# Patient Record
Sex: MALE | Race: WHITE | NOT HISPANIC OR LATINO | URBAN - METROPOLITAN AREA
[De-identification: names, ages, dates, MRNs, and addresses within clinical notes are randomized per-mention and may not be internally consistent; named-entity substitution may affect disease eponyms.]

---

## 2017-11-28 ENCOUNTER — EMERGENCY (EMERGENCY)
Facility: HOSPITAL | Age: 57
LOS: 1 days | Discharge: HOME | End: 2017-11-28
Attending: EMERGENCY MEDICINE | Admitting: EMERGENCY MEDICINE
Payer: COMMERCIAL

## 2017-11-28 VITALS
DIASTOLIC BLOOD PRESSURE: 76 MMHG | HEART RATE: 73 BPM | SYSTOLIC BLOOD PRESSURE: 132 MMHG | TEMPERATURE: 98 F | OXYGEN SATURATION: 100 % | RESPIRATION RATE: 16 BRPM

## 2017-11-28 PROCEDURE — 99282 EMERGENCY DEPT VISIT SF MDM: CPT

## 2017-11-28 RX ORDER — IBUPROFEN 200 MG
1 TABLET ORAL
Qty: 20 | Refills: 0 | OUTPATIENT
Start: 2017-11-28 | End: 2017-12-03

## 2017-11-28 NOTE — ED STATDOCS - NS_ ATTENDINGSCRIBEDETAILS _ED_A_ED_FT
I, Vel Baldwin, performed the initial face to face bedside interview with this patient regarding history of present illness, review of symptoms and relevant past medical, social and family history.  I completed an independent physical examination.    The history, relevant review of systems, past medical and surgical history, medical decision making, and physical examination was documented by the scribe in my presence and I attest to the accuracy of the documentation.

## 2017-11-28 NOTE — ED STATDOCS - OBJECTIVE STATEMENT
55 y/o M w/ PMH of GERD presents to ED c/o R knee pain xfew hours. Pt states while at work today he was on both knees working on a table top for prolonged period of time when he gradually noticed pain to the R knee upon standing. He notes a small abrasion to the R knee. Pain is exacerbated with flexion of the knee. Denies recent trauma/injury/fall, numbness, tingling, weakness or any other complaints at this time.

## 2017-11-28 NOTE — ED ADULT TRIAGE NOTE - CHIEF COMPLAINT QUOTE
pt c/o right knee swelling states he kneeled down in lab and now has an abrasion as well c/o discomfort to right knee

## 2018-01-11 ENCOUNTER — EMERGENCY (EMERGENCY)
Facility: HOSPITAL | Age: 58
LOS: 1 days | Discharge: HOME | End: 2018-01-11
Attending: EMERGENCY MEDICINE
Payer: COMMERCIAL

## 2018-01-11 VITALS — WEIGHT: 214.95 LBS | HEIGHT: 74 IN

## 2018-01-11 PROCEDURE — 99283 EMERGENCY DEPT VISIT LOW MDM: CPT

## 2018-01-11 PROCEDURE — 90715 TDAP VACCINE 7 YRS/> IM: CPT

## 2018-01-11 PROCEDURE — 90471 IMMUNIZATION ADMIN: CPT

## 2018-01-11 PROCEDURE — 99283 EMERGENCY DEPT VISIT LOW MDM: CPT | Mod: 25

## 2018-01-11 RX ORDER — TETANUS TOXOID, REDUCED DIPHTHERIA TOXOID AND ACELLULAR PERTUSSIS VACCINE, ADSORBED 5; 2.5; 8; 8; 2.5 [IU]/.5ML; [IU]/.5ML; UG/.5ML; UG/.5ML; UG/.5ML
0.5 SUSPENSION INTRAMUSCULAR ONCE
Qty: 0 | Refills: 0 | Status: COMPLETED | OUTPATIENT
Start: 2018-01-11 | End: 2018-01-11

## 2018-01-11 RX ORDER — CEPHALEXIN 500 MG
500 CAPSULE ORAL ONCE
Qty: 0 | Refills: 0 | Status: COMPLETED | OUTPATIENT
Start: 2018-01-11 | End: 2018-01-11

## 2018-01-11 RX ORDER — CEPHALEXIN 500 MG
1 CAPSULE ORAL
Qty: 21 | Refills: 0 | OUTPATIENT
Start: 2018-01-11 | End: 2018-01-17

## 2018-01-11 RX ADMIN — Medication 500 MILLIGRAM(S): at 12:14

## 2018-01-11 RX ADMIN — TETANUS TOXOID, REDUCED DIPHTHERIA TOXOID AND ACELLULAR PERTUSSIS VACCINE, ADSORBED 0.5 MILLILITER(S): 5; 2.5; 8; 8; 2.5 SUSPENSION INTRAMUSCULAR at 12:14

## 2018-01-11 NOTE — ED PROVIDER NOTE - SKIN WOUND DESCRIPTION
no bony ttp. + multiple small fb noted to b/l dorsal aspect between 1st and 2nd web space. NO st, no erythema or dc

## 2018-01-11 NOTE — ED PROVIDER NOTE - ATTENDING CONTRIBUTION TO CARE
Patient with debri noted under skin with no breaks in skin on exam after an injury at work.  Normal range or motion of fingers and hands.  Patient instructed to continued washing hands with soap and water.  Follow-up with PMD and if symptoms worsen return to the ER.

## 2018-01-11 NOTE — ED PROVIDER NOTE - PROGRESS NOTE DETAILS
Pt educated that fb will likely just work themselves out. Abx to prevent infection and f/u as needed. Pt verbalizes understanding

## 2018-01-11 NOTE — ED PROVIDER NOTE - OBJECTIVE STATEMENT
58 yo M presented to ED with c/o multiple fb after a high pressure valve broke at work and pushed debris that was on the end of the valve under skin. Pt denies any pain. No fevers. No redness or sts. dT Unknown

## 2018-01-11 NOTE — ED ADULT NURSE NOTE - OBJECTIVE STATEMENT
shards of glass under right second digit while at work. states it may have been metal. no other complaints. small amount of inflammation present

## 2018-01-22 ENCOUNTER — APPOINTMENT (OUTPATIENT)
Dept: SURGERY | Facility: CLINIC | Age: 58
End: 2018-01-22
Payer: COMMERCIAL

## 2018-01-22 VITALS
HEART RATE: 73 BPM | DIASTOLIC BLOOD PRESSURE: 74 MMHG | RESPIRATION RATE: 16 BRPM | TEMPERATURE: 97.6 F | BODY MASS INDEX: 29.77 KG/M2 | SYSTOLIC BLOOD PRESSURE: 142 MMHG | HEIGHT: 74 IN | OXYGEN SATURATION: 96 % | WEIGHT: 232 LBS

## 2018-01-22 DIAGNOSIS — Z80.1 FAMILY HISTORY OF MALIGNANT NEOPLASM OF TRACHEA, BRONCHUS AND LUNG: ICD-10-CM

## 2018-01-22 DIAGNOSIS — Z82.5 FAMILY HISTORY OF ASTHMA AND OTHER CHRONIC LOWER RESPIRATORY DISEASES: ICD-10-CM

## 2018-01-22 PROCEDURE — 99244 OFF/OP CNSLTJ NEW/EST MOD 40: CPT

## 2018-03-19 ENCOUNTER — APPOINTMENT (OUTPATIENT)
Dept: SURGERY | Facility: CLINIC | Age: 58
End: 2018-03-19
Payer: COMMERCIAL

## 2018-03-19 VITALS
HEIGHT: 74 IN | WEIGHT: 226.03 LBS | BODY MASS INDEX: 29.01 KG/M2 | OXYGEN SATURATION: 98 % | HEART RATE: 61 BPM | RESPIRATION RATE: 16 BRPM | SYSTOLIC BLOOD PRESSURE: 132 MMHG | TEMPERATURE: 97.5 F | DIASTOLIC BLOOD PRESSURE: 71 MMHG

## 2018-03-19 PROCEDURE — 99214 OFFICE O/P EST MOD 30 MIN: CPT

## 2018-04-10 ENCOUNTER — EMERGENCY (EMERGENCY)
Facility: HOSPITAL | Age: 58
LOS: 1 days | Discharge: HOME | End: 2018-04-10
Attending: EMERGENCY MEDICINE
Payer: COMMERCIAL

## 2018-04-10 VITALS
WEIGHT: 225.09 LBS | HEIGHT: 74 IN | RESPIRATION RATE: 18 BRPM | DIASTOLIC BLOOD PRESSURE: 81 MMHG | SYSTOLIC BLOOD PRESSURE: 137 MMHG | OXYGEN SATURATION: 96 % | HEART RATE: 73 BPM | TEMPERATURE: 98 F

## 2018-04-10 PROCEDURE — 99283 EMERGENCY DEPT VISIT LOW MDM: CPT

## 2018-04-10 RX ORDER — IBUPROFEN 200 MG
600 TABLET ORAL ONCE
Qty: 0 | Refills: 0 | Status: COMPLETED | OUTPATIENT
Start: 2018-04-10 | End: 2018-04-10

## 2018-04-10 RX ADMIN — Medication 600 MILLIGRAM(S): at 16:32

## 2018-04-10 NOTE — ED PROVIDER NOTE - SKIN LOCATION #2
finger/Linear superficial 1st degree burn to palmar aspect of 3rd-4th and 5th phalanx including Hypothenar imminence. No blisters.

## 2018-04-10 NOTE — ED PROVIDER NOTE - OBJECTIVE STATEMENT
Pt. present to ED with burn to his right and left hand. 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 The wire that the patient was holding was touched by "live wire". Pt. states that the wire became very hot and he immediately let go of the wire.  Pt's wire was struck with  [employer]. current. This occurred around 11:45 AM. Pt. with no palpitations. NO SOB. Pt. does not have any injuries. Pt's tetanus status is up to date.

## 2018-04-10 NOTE — ED ADULT TRIAGE NOTE - CHIEF COMPLAINT QUOTE
PT c/o electric that occurred today. Pt states he was fixing a wire and was hit with an electric shock in the left hand. Pt has discoloring to left hand on fingers but has full ROM Pt is A & Ox4, respirations are even & unlabored.

## 2018-04-10 NOTE — ED PROVIDER NOTE - SKIN LATERALITY #1
left 1st/Pt. with superficial Linear 1st degree burn noted to palmar surface of Left 1st through 5th digit. No vesicles noted.

## 2018-04-10 NOTE — ED PROVIDER NOTE - MEDICAL DECISION MAKING DETAILS
Pt. with superficial burn(1st degree) to right and left fingers. TD up to date. NO ABX needed. Dressing applied with bacitracin.

## 2018-04-10 NOTE — ED PROVIDER NOTE - PHYSICAL EXAMINATION
linear burn marks noted to both fingers; Right hand-all digits included. Left hand-3-4-5th digits. Pt. able to open and close his hand.

## 2018-04-30 ENCOUNTER — APPOINTMENT (OUTPATIENT)
Dept: SURGERY | Facility: CLINIC | Age: 58
End: 2018-04-30
Payer: COMMERCIAL

## 2018-04-30 VITALS
BODY MASS INDEX: 29.52 KG/M2 | TEMPERATURE: 97.6 F | DIASTOLIC BLOOD PRESSURE: 71 MMHG | OXYGEN SATURATION: 98 % | HEART RATE: 62 BPM | RESPIRATION RATE: 16 BRPM | WEIGHT: 230 LBS | HEIGHT: 74 IN | SYSTOLIC BLOOD PRESSURE: 132 MMHG

## 2018-04-30 PROCEDURE — 99214 OFFICE O/P EST MOD 30 MIN: CPT

## 2018-04-30 RX ORDER — IBUPROFEN 600 MG/1
600 TABLET, FILM COATED ORAL
Qty: 20 | Refills: 0 | Status: DISCONTINUED | COMMUNITY
Start: 2017-11-28

## 2018-04-30 RX ORDER — CEPHALEXIN 500 MG/1
500 CAPSULE ORAL
Qty: 21 | Refills: 0 | Status: DISCONTINUED | COMMUNITY
Start: 2018-01-11

## 2018-06-15 ENCOUNTER — APPOINTMENT (OUTPATIENT)
Dept: SURGERY | Facility: CLINIC | Age: 58
End: 2018-06-15
Payer: COMMERCIAL

## 2018-06-15 VITALS
WEIGHT: 226.03 LBS | HEIGHT: 74 IN | RESPIRATION RATE: 16 BRPM | OXYGEN SATURATION: 98 % | HEART RATE: 59 BPM | BODY MASS INDEX: 29.01 KG/M2 | DIASTOLIC BLOOD PRESSURE: 74 MMHG | TEMPERATURE: 98.6 F | SYSTOLIC BLOOD PRESSURE: 132 MMHG

## 2018-06-15 PROCEDURE — 99214 OFFICE O/P EST MOD 30 MIN: CPT

## 2018-08-09 ENCOUNTER — EMERGENCY (EMERGENCY)
Facility: HOSPITAL | Age: 58
LOS: 1 days | Discharge: HOME | End: 2018-08-09
Attending: EMERGENCY MEDICINE
Payer: COMMERCIAL

## 2018-08-09 VITALS
DIASTOLIC BLOOD PRESSURE: 78 MMHG | HEART RATE: 63 BPM | RESPIRATION RATE: 20 BRPM | TEMPERATURE: 98 F | HEIGHT: 74 IN | SYSTOLIC BLOOD PRESSURE: 136 MMHG | WEIGHT: 220.02 LBS | OXYGEN SATURATION: 95 %

## 2018-08-09 PROBLEM — K21.9 GASTRO-ESOPHAGEAL REFLUX DISEASE WITHOUT ESOPHAGITIS: Chronic | Status: ACTIVE | Noted: 2017-12-04

## 2018-08-09 PROCEDURE — 96372 THER/PROPH/DIAG INJ SC/IM: CPT

## 2018-08-09 PROCEDURE — 99283 EMERGENCY DEPT VISIT LOW MDM: CPT

## 2018-08-09 PROCEDURE — 99283 EMERGENCY DEPT VISIT LOW MDM: CPT | Mod: 25

## 2018-08-09 RX ORDER — METHOCARBAMOL 500 MG/1
1500 TABLET, FILM COATED ORAL ONCE
Qty: 0 | Refills: 0 | Status: COMPLETED | OUTPATIENT
Start: 2018-08-09 | End: 2018-08-09

## 2018-08-09 RX ORDER — IBUPROFEN 200 MG
1 TABLET ORAL
Qty: 28 | Refills: 0 | OUTPATIENT
Start: 2018-08-09 | End: 2018-08-15

## 2018-08-09 RX ORDER — KETOROLAC TROMETHAMINE 30 MG/ML
60 SYRINGE (ML) INJECTION ONCE
Qty: 0 | Refills: 0 | Status: DISCONTINUED | OUTPATIENT
Start: 2018-08-09 | End: 2018-08-09

## 2018-08-09 RX ORDER — METHOCARBAMOL 500 MG/1
2 TABLET, FILM COATED ORAL
Qty: 30 | Refills: 0 | OUTPATIENT
Start: 2018-08-09 | End: 2018-08-13

## 2018-08-09 RX ADMIN — METHOCARBAMOL 1500 MILLIGRAM(S): 500 TABLET, FILM COATED ORAL at 15:37

## 2018-08-09 RX ADMIN — Medication 60 MILLIGRAM(S): at 15:37

## 2018-08-09 NOTE — ED ADULT TRIAGE NOTE - CHIEF COMPLAINT QUOTE
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 Ambulatory into ED with steady gait. Denies taking any pain medication PTA.

## 2018-08-09 NOTE — ED ADULT NURSE NOTE - OBJECTIVE STATEMENT
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 a&ox3. breathing si even and unlabored. will continue to monitor and reassess

## 2018-08-09 NOTE — ED STATDOCS - CHPI ED SYMPTOM NEG
no hematuria/no fever/no nausea/no numbness/no chills/no decreased eating/drinking/no vomiting/no weakness/no palpitations/no dysuria

## 2018-08-09 NOTE — ED ADULT NURSE NOTE - NSIMPLEMENTINTERV_GEN_ALL_ED
Implemented All Universal Safety Interventions:  [place] to call system. Call bell, personal items and telephone within reach. Instruct patient to call for assistance. Room bathroom lighting operational. Non-slip footwear when patient is off stretcher. Physically safe environment: no spills, clutter or unnecessary equipment. Stretcher in lowest position, wheels locked, appropriate side rails in place.

## 2018-08-09 NOTE — ED STATDOCS - ATTENDING CONTRIBUTION TO CARE
57y M with no PMH complaining of back injury while working yesterday. 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 He reports feeling a "twinge" but took alleve and continued working. Today the pain was present and he wanted to get it checked out. He reports the pain is in his lower back. pe back  diffuse lumbar paraspinal tenderness  dx back pain tx with pain meds and muscle relaxants

## 2018-08-09 NOTE — ED STATDOCS - OBJECTIVE STATEMENT
Pt is a 57y M with no PMH complaining of back injury while working yesterday. 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 He reports feeling a "twinge" but took alleve and continued working. Today the pain was present and he wanted to get it checked out. He reports the pain is in his lower back. The pain is not midline. He is ambulatory. No numbness/tingling. No bowel or bladder incontinence. No medication today. NKDA.

## 2018-08-10 ENCOUNTER — APPOINTMENT (OUTPATIENT)
Dept: SURGERY | Facility: CLINIC | Age: 58
End: 2018-08-10
Payer: COMMERCIAL

## 2018-08-10 VITALS
HEIGHT: 74 IN | OXYGEN SATURATION: 97 % | BODY MASS INDEX: 29.65 KG/M2 | HEART RATE: 59 BPM | TEMPERATURE: 98 F | WEIGHT: 231 LBS | SYSTOLIC BLOOD PRESSURE: 128 MMHG | DIASTOLIC BLOOD PRESSURE: 72 MMHG

## 2018-08-10 PROCEDURE — 99214 OFFICE O/P EST MOD 30 MIN: CPT

## 2018-09-10 ENCOUNTER — APPOINTMENT (OUTPATIENT)
Dept: ULTRASOUND IMAGING | Facility: CLINIC | Age: 58
End: 2018-09-10

## 2018-09-10 ENCOUNTER — TRANSCRIPTION ENCOUNTER (OUTPATIENT)
Age: 58
End: 2018-09-10

## 2018-09-10 ENCOUNTER — OUTPATIENT (OUTPATIENT)
Dept: OUTPATIENT SERVICES | Facility: HOSPITAL | Age: 58
LOS: 1 days | End: 2018-09-10
Payer: COMMERCIAL

## 2018-09-10 DIAGNOSIS — Z00.8 ENCOUNTER FOR OTHER GENERAL EXAMINATION: ICD-10-CM

## 2018-09-10 PROCEDURE — 93971 EXTREMITY STUDY: CPT | Mod: 26,RT

## 2018-09-11 ENCOUNTER — EMERGENCY (EMERGENCY)
Facility: HOSPITAL | Age: 58
LOS: 1 days | Discharge: HOME | End: 2018-09-11
Attending: EMERGENCY MEDICINE
Payer: COMMERCIAL

## 2018-09-11 VITALS
RESPIRATION RATE: 16 BRPM | SYSTOLIC BLOOD PRESSURE: 133 MMHG | DIASTOLIC BLOOD PRESSURE: 86 MMHG | HEART RATE: 63 BPM | OXYGEN SATURATION: 96 % | TEMPERATURE: 98 F

## 2018-09-11 VITALS — HEIGHT: 74 IN | WEIGHT: 218.04 LBS

## 2018-09-11 PROCEDURE — 99283 EMERGENCY DEPT VISIT LOW MDM: CPT

## 2018-09-11 RX ORDER — APIXABAN 2.5 MG/1
1 TABLET, FILM COATED ORAL
Qty: 14 | Refills: 0 | OUTPATIENT
Start: 2018-09-11 | End: 2018-09-17

## 2018-09-11 RX ORDER — ASPIRIN/CALCIUM CARB/MAGNESIUM 324 MG
325 TABLET ORAL ONCE
Qty: 0 | Refills: 0 | Status: COMPLETED | OUTPATIENT
Start: 2018-09-11 | End: 2018-09-11

## 2018-09-11 RX ADMIN — Medication 325 MILLIGRAM(S): at 09:28

## 2018-09-11 NOTE — ED ADULT NURSE NOTE - OBJECTIVE STATEMENT
56 y/o male presents to ed s/p flight 1 week ago; has pain and bump behind right leg; had outpatient sono and revealed small blood clot. sent here to decide if he needs anticoags. patient denies any pain at this time. moves all extremities. + pulse.

## 2018-09-11 NOTE — ED STATDOCS - NS_ ATTENDINGSCRIBEDETAILS _ED_A_ED_FT
I, Vel Baldwin, performed the initial face to face bedside interview with this patient regarding history of present illness, review of symptoms and relevant past medical, social and family history.  I completed an independent physical examination.  I was the initial provider who evaluated this patient. I have signed out the follow up of any pending tests (i.e. labs, radiological studies) to the ACP.  I have communicated the patient’s plan of care and disposition with the ACP.  The history, relevant review of systems, past medical and surgical history, medical decision making, and physical examination was documented by the scribe in my presence and I attest to the accuracy of the documentation.

## 2018-09-11 NOTE — ED ADULT TRIAGE NOTE - CHIEF COMPLAINT QUOTE
"I went to urgent care and had a doppler and have a small blood clot in my right lower leg I was told it was superficial but I might need anticoagulants to treat it. " Pt A & OX4, respirations even & unlabored.

## 2018-09-11 NOTE — ED ADULT NURSE NOTE - NSIMPLEMENTINTERV_GEN_ALL_ED
Implemented All Universal Safety Interventions:  [place] to call system. Call bell, personal items and telephone within reach. Instruct patient to call for assistance. Room bathroom lighting operational. Non-slip footwear when patient is off stretcher. Physically safe environment: no spills, clutter or unnecessary equipment. Stretcher in lowest position, wheels locked, appropriate side rails in place.

## 2018-09-11 NOTE — ED STATDOCS - PROGRESS NOTE DETAILS
spoke to vascular MD Mitchell Klein, advised to treat as patient is symptomatic with leg pain, will see in office within 1 week, spoke to pharmacy Eliquis is covered, zero co-payment.

## 2018-09-17 ENCOUNTER — APPOINTMENT (OUTPATIENT)
Dept: VASCULAR SURGERY | Facility: CLINIC | Age: 58
End: 2018-09-17
Payer: COMMERCIAL

## 2018-09-17 VITALS
BODY MASS INDEX: 30.56 KG/M2 | TEMPERATURE: 98.1 F | HEART RATE: 60 BPM | OXYGEN SATURATION: 95 % | SYSTOLIC BLOOD PRESSURE: 137 MMHG | DIASTOLIC BLOOD PRESSURE: 75 MMHG | WEIGHT: 238 LBS

## 2018-09-17 DIAGNOSIS — Z82.49 FAMILY HISTORY OF ISCHEMIC HEART DISEASE AND OTHER DISEASES OF THE CIRCULATORY SYSTEM: ICD-10-CM

## 2018-09-17 DIAGNOSIS — Z80.9 FAMILY HISTORY OF MALIGNANT NEOPLASM, UNSPECIFIED: ICD-10-CM

## 2018-09-17 PROCEDURE — 99243 OFF/OP CNSLTJ NEW/EST LOW 30: CPT

## 2018-09-18 PROBLEM — Z82.49 FAMILY HISTORY OF CARDIAC DISORDER: Status: ACTIVE | Noted: 2018-09-17

## 2018-09-18 PROBLEM — Z80.9 FAMILY HISTORY OF MALIGNANT NEOPLASM: Status: ACTIVE | Noted: 2018-09-17

## 2018-09-18 RX ORDER — AMOXICILLIN AND CLAVULANATE POTASSIUM 875; 125 MG/1; MG/1
875-125 TABLET, COATED ORAL
Qty: 14 | Refills: 0 | Status: COMPLETED | COMMUNITY
Start: 2018-02-22 | End: 2018-09-18

## 2018-09-19 ENCOUNTER — RX RENEWAL (OUTPATIENT)
Age: 58
End: 2018-09-19

## 2018-09-19 RX ORDER — APIXABAN 5 MG/1
5 TABLET, FILM COATED ORAL
Qty: 180 | Refills: 1 | Status: COMPLETED | COMMUNITY
Start: 2018-09-11 | End: 2018-09-19

## 2018-09-22 ENCOUNTER — EMERGENCY (EMERGENCY)
Facility: HOSPITAL | Age: 58
LOS: 1 days | Discharge: HOME | End: 2018-09-22
Attending: EMERGENCY MEDICINE
Payer: COMMERCIAL

## 2018-09-22 VITALS
RESPIRATION RATE: 20 BRPM | HEIGHT: 74 IN | WEIGHT: 220.02 LBS | TEMPERATURE: 98 F | OXYGEN SATURATION: 98 % | HEART RATE: 65 BPM | SYSTOLIC BLOOD PRESSURE: 127 MMHG | DIASTOLIC BLOOD PRESSURE: 70 MMHG

## 2018-09-22 LAB
ALBUMIN SERPL ELPH-MCNC: 4.5 G/DL — SIGNIFICANT CHANGE UP (ref 3.3–5.2)
ALP SERPL-CCNC: 76 U/L — SIGNIFICANT CHANGE UP (ref 40–120)
ALT FLD-CCNC: 29 U/L — SIGNIFICANT CHANGE UP
ANION GAP SERPL CALC-SCNC: 14 MMOL/L — SIGNIFICANT CHANGE UP (ref 5–17)
APPEARANCE UR: CLEAR — SIGNIFICANT CHANGE UP
APTT BLD: 33.8 SEC — SIGNIFICANT CHANGE UP (ref 27.5–37.4)
AST SERPL-CCNC: 29 U/L — SIGNIFICANT CHANGE UP
BASOPHILS # BLD AUTO: 0 K/UL — SIGNIFICANT CHANGE UP (ref 0–0.2)
BASOPHILS NFR BLD AUTO: 0.2 % — SIGNIFICANT CHANGE UP (ref 0–2)
BILIRUB SERPL-MCNC: 0.7 MG/DL — SIGNIFICANT CHANGE UP (ref 0.4–2)
BILIRUB UR-MCNC: NEGATIVE — SIGNIFICANT CHANGE UP
BUN SERPL-MCNC: 15 MG/DL — SIGNIFICANT CHANGE UP (ref 8–20)
CALCIUM SERPL-MCNC: 9.7 MG/DL — SIGNIFICANT CHANGE UP (ref 8.6–10.2)
CHLORIDE SERPL-SCNC: 105 MMOL/L — SIGNIFICANT CHANGE UP (ref 98–107)
CO2 SERPL-SCNC: 24 MMOL/L — SIGNIFICANT CHANGE UP (ref 22–29)
COLOR SPEC: YELLOW — SIGNIFICANT CHANGE UP
CREAT SERPL-MCNC: 0.92 MG/DL — SIGNIFICANT CHANGE UP (ref 0.5–1.3)
DIFF PNL FLD: ABNORMAL
EOSINOPHIL # BLD AUTO: 0 K/UL — SIGNIFICANT CHANGE UP (ref 0–0.5)
EOSINOPHIL NFR BLD AUTO: 0.4 % — SIGNIFICANT CHANGE UP (ref 0–5)
EPI CELLS # UR: SIGNIFICANT CHANGE UP
GLUCOSE SERPL-MCNC: 92 MG/DL — SIGNIFICANT CHANGE UP (ref 70–115)
GLUCOSE UR QL: NEGATIVE MG/DL — SIGNIFICANT CHANGE UP
HCT VFR BLD CALC: 45 % — SIGNIFICANT CHANGE UP (ref 42–52)
HGB BLD-MCNC: 14.9 G/DL — SIGNIFICANT CHANGE UP (ref 14–18)
INR BLD: 1.16 RATIO — SIGNIFICANT CHANGE UP (ref 0.88–1.16)
KETONES UR-MCNC: ABNORMAL
LEUKOCYTE ESTERASE UR-ACNC: ABNORMAL
LYMPHOCYTES # BLD AUTO: 1.8 K/UL — SIGNIFICANT CHANGE UP (ref 1–4.8)
LYMPHOCYTES # BLD AUTO: 19.7 % — LOW (ref 20–55)
MCHC RBC-ENTMCNC: 28.9 PG — SIGNIFICANT CHANGE UP (ref 27–31)
MCHC RBC-ENTMCNC: 33.1 G/DL — SIGNIFICANT CHANGE UP (ref 32–36)
MCV RBC AUTO: 87.4 FL — SIGNIFICANT CHANGE UP (ref 80–94)
MONOCYTES # BLD AUTO: 0.6 K/UL — SIGNIFICANT CHANGE UP (ref 0–0.8)
MONOCYTES NFR BLD AUTO: 7.1 % — SIGNIFICANT CHANGE UP (ref 3–10)
NEUTROPHILS # BLD AUTO: 6.4 K/UL — SIGNIFICANT CHANGE UP (ref 1.8–8)
NEUTROPHILS NFR BLD AUTO: 70.6 % — SIGNIFICANT CHANGE UP (ref 37–73)
NITRITE UR-MCNC: NEGATIVE — SIGNIFICANT CHANGE UP
PH UR: 5 — SIGNIFICANT CHANGE UP (ref 5–8)
PLATELET # BLD AUTO: 218 K/UL — SIGNIFICANT CHANGE UP (ref 150–400)
POTASSIUM SERPL-MCNC: 4.6 MMOL/L — SIGNIFICANT CHANGE UP (ref 3.5–5.3)
POTASSIUM SERPL-SCNC: 4.6 MMOL/L — SIGNIFICANT CHANGE UP (ref 3.5–5.3)
PROT SERPL-MCNC: 7.6 G/DL — SIGNIFICANT CHANGE UP (ref 6.6–8.7)
PROT UR-MCNC: 15 MG/DL
PROTHROM AB SERPL-ACNC: 12.8 SEC — HIGH (ref 9.8–12.7)
RBC # BLD: 5.15 M/UL — SIGNIFICANT CHANGE UP (ref 4.6–6.2)
RBC # FLD: 13.2 % — SIGNIFICANT CHANGE UP (ref 11–15.6)
RBC CASTS # UR COMP ASSIST: ABNORMAL /HPF (ref 0–4)
SODIUM SERPL-SCNC: 143 MMOL/L — SIGNIFICANT CHANGE UP (ref 135–145)
SP GR SPEC: 1.02 — SIGNIFICANT CHANGE UP (ref 1.01–1.02)
UROBILINOGEN FLD QL: NEGATIVE MG/DL — SIGNIFICANT CHANGE UP
WBC # BLD: 9 K/UL — SIGNIFICANT CHANGE UP (ref 4.8–10.8)
WBC # FLD AUTO: 9 K/UL — SIGNIFICANT CHANGE UP (ref 4.8–10.8)
WBC UR QL: SIGNIFICANT CHANGE UP

## 2018-09-22 PROCEDURE — 73130 X-RAY EXAM OF HAND: CPT

## 2018-09-22 PROCEDURE — 85730 THROMBOPLASTIN TIME PARTIAL: CPT

## 2018-09-22 PROCEDURE — 71101 X-RAY EXAM UNILAT RIBS/CHEST: CPT

## 2018-09-22 PROCEDURE — 73110 X-RAY EXAM OF WRIST: CPT

## 2018-09-22 PROCEDURE — 80053 COMPREHEN METABOLIC PANEL: CPT

## 2018-09-22 PROCEDURE — 71100 X-RAY EXAM RIBS UNI 2 VIEWS: CPT | Mod: 26,LT

## 2018-09-22 PROCEDURE — 99284 EMERGENCY DEPT VISIT MOD MDM: CPT

## 2018-09-22 PROCEDURE — 71046 X-RAY EXAM CHEST 2 VIEWS: CPT | Mod: 26

## 2018-09-22 PROCEDURE — 71046 X-RAY EXAM CHEST 2 VIEWS: CPT

## 2018-09-22 PROCEDURE — 73110 X-RAY EXAM OF WRIST: CPT | Mod: 26,LT

## 2018-09-22 PROCEDURE — 73130 X-RAY EXAM OF HAND: CPT | Mod: 26,LT

## 2018-09-22 PROCEDURE — 99284 EMERGENCY DEPT VISIT MOD MDM: CPT | Mod: 25

## 2018-09-22 PROCEDURE — 85610 PROTHROMBIN TIME: CPT

## 2018-09-22 PROCEDURE — 85027 COMPLETE CBC AUTOMATED: CPT

## 2018-09-22 PROCEDURE — 70450 CT HEAD/BRAIN W/O DYE: CPT

## 2018-09-22 PROCEDURE — 36415 COLL VENOUS BLD VENIPUNCTURE: CPT

## 2018-09-22 PROCEDURE — 81001 URINALYSIS AUTO W/SCOPE: CPT

## 2018-09-22 PROCEDURE — 70450 CT HEAD/BRAIN W/O DYE: CPT | Mod: 26

## 2018-09-22 RX ORDER — ACETAMINOPHEN 500 MG
975 TABLET ORAL ONCE
Qty: 0 | Refills: 0 | Status: COMPLETED | OUTPATIENT
Start: 2018-09-22 | End: 2018-09-22

## 2018-09-22 RX ADMIN — Medication 975 MILLIGRAM(S): at 20:16

## 2018-09-22 NOTE — ED STATDOCS - RANGE OF MOTION, MLM
slight tenderness to L chest wall, T2-T4, hips intact, no long bone deformity, pain to distal ulnar aspect of the wrist

## 2018-09-22 NOTE — ED STATDOCS - OBJECTIVE STATEMENT
56 y/o M pt with hx of GERD, bladder reconstruction, hernia repair and appendectomy presents to ED c/o L chest wall pain and L wrist pain s/p fall off ladder that occurred 1.5 hours PTA. Pt states he was standing on a 5 foot ladder when the ladder collapsed, he jumped off landing onto his L side. Pt is currently on Eliquis for a small DVT, he has been taking it for 11 days. He did not take any pain medication PTA.  Tetanus is UTD. Denies head injury, neck pain, LOC, SOB, nausea, vomiting, confusion, slurred speech and neck pain. No further complaints at this time.   PMD: Dr. John Cedillo

## 2018-09-22 NOTE — ED ADULT NURSE NOTE - OBJECTIVE STATEMENT
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

## 2018-09-22 NOTE — ED STATDOCS - MEDICAL DECISION MAKING DETAILS
L chest wall/ L wrist pain s/p fall x 5 feet off ladder without head/neck trauma, admits to Eliquis x 14 days for DVT, no other symptoms, CXR, ribs, wrist, and hand for fracture, head CT for bleed given Eliquis with labs and urine for hematuria, observe, check and reassess

## 2018-09-22 NOTE — ED ADULT NURSE NOTE - NSIMPLEMENTINTERV_GEN_ALL_ED
Implemented All Fall with Harm Risk Interventions:  [place] to call system. Call bell, personal items and telephone within reach. Instruct patient to call for assistance. Room bathroom lighting operational. Non-slip footwear when patient is off stretcher. Physically safe environment: no spills, clutter or unnecessary equipment. Stretcher in lowest position, wheels locked, appropriate side rails in place. Provide visual cue, wrist band, yellow gown, etc. Monitor gait and stability. Monitor for mental status changes and reorient to person, place, and time. Review medications for side effects contributing to fall risk. Reinforce activity limits and safety measures with patient and family. Provide visual clues: red socks.

## 2018-09-22 NOTE — ED STATDOCS - ATTENDING CONTRIBUTION TO CARE
I, Bandar Salazar, performed the initial face to face bedside interview with this patient regarding history of present illness, review of symptoms and relevant past medical, social and family history.  I completed an independent physical examination.  I was the initial provider who evaluated this patient. I have signed out the follow up of any pending tests (i.e. labs, radiological studies) to the ACP.  I have communicated the patient’s plan of care and disposition with the ACP.

## 2018-09-22 NOTE — ED STATDOCS - GAIT/WEIGHT BEARING, MLM
CN II-XII intact, no pronator drift, rapid alternating hand movements are normal, normal grasp in L hand

## 2018-09-22 NOTE — ED ADULT TRIAGE NOTE - CHIEF COMPLAINT QUOTE
Pt states he was on the ladder approx 5 feet tall , when ladder collapsed , pt jumped sidewise landing on the l side  hurting l wrist and chest wall , denies LOC. denies head trauma , small abrasion noted on taz R side of the head

## 2018-09-22 NOTE — ED STATDOCS - CARE PLAN
Principal Discharge DX:	Fall  Secondary Diagnosis:	Musculoskeletal pain Principal Discharge DX:	Fall  Secondary Diagnosis:	Musculoskeletal pain  Secondary Diagnosis:	Contusion

## 2018-09-22 NOTE — ED STATDOCS - PROGRESS NOTE DETAILS
Pt seen by intake MD and agree with HPI/RoS/ PE Plan. Will splint wrist and dc pending official read.

## 2018-10-04 ENCOUNTER — OUTPATIENT (OUTPATIENT)
Dept: OUTPATIENT SERVICES | Facility: HOSPITAL | Age: 58
LOS: 1 days | End: 2018-10-04
Payer: COMMERCIAL

## 2018-10-04 DIAGNOSIS — S63.502D UNSPECIFIED SPRAIN OF LEFT WRIST, SUBSEQUENT ENCOUNTER: ICD-10-CM

## 2018-10-04 DIAGNOSIS — Z51.89 ENCOUNTER FOR OTHER SPECIFIED AFTERCARE: ICD-10-CM

## 2018-11-07 ENCOUNTER — APPOINTMENT (OUTPATIENT)
Dept: SURGERY | Facility: CLINIC | Age: 58
End: 2018-11-07
Payer: COMMERCIAL

## 2018-11-07 VITALS
RESPIRATION RATE: 16 BRPM | OXYGEN SATURATION: 98 % | HEART RATE: 68 BPM | WEIGHT: 227 LBS | DIASTOLIC BLOOD PRESSURE: 71 MMHG | HEIGHT: 74 IN | TEMPERATURE: 97.7 F | BODY MASS INDEX: 29.13 KG/M2 | SYSTOLIC BLOOD PRESSURE: 132 MMHG

## 2018-11-07 PROCEDURE — 99214 OFFICE O/P EST MOD 30 MIN: CPT

## 2018-12-17 ENCOUNTER — APPOINTMENT (OUTPATIENT)
Dept: VASCULAR SURGERY | Facility: CLINIC | Age: 58
End: 2018-12-17
Payer: COMMERCIAL

## 2018-12-17 VITALS
HEART RATE: 67 BPM | BODY MASS INDEX: 29.13 KG/M2 | WEIGHT: 227 LBS | SYSTOLIC BLOOD PRESSURE: 131 MMHG | OXYGEN SATURATION: 96 % | DIASTOLIC BLOOD PRESSURE: 75 MMHG | HEIGHT: 74 IN | TEMPERATURE: 97.5 F

## 2018-12-17 PROCEDURE — 99214 OFFICE O/P EST MOD 30 MIN: CPT

## 2018-12-20 PROCEDURE — 97140 MANUAL THERAPY 1/> REGIONS: CPT

## 2018-12-20 PROCEDURE — 97161 PT EVAL LOW COMPLEX 20 MIN: CPT

## 2018-12-20 PROCEDURE — 97010 HOT OR COLD PACKS THERAPY: CPT

## 2018-12-20 PROCEDURE — 97110 THERAPEUTIC EXERCISES: CPT

## 2018-12-27 ENCOUNTER — APPOINTMENT (OUTPATIENT)
Dept: VASCULAR SURGERY | Facility: CLINIC | Age: 58
End: 2018-12-27

## 2018-12-28 ENCOUNTER — APPOINTMENT (OUTPATIENT)
Dept: VASCULAR SURGERY | Facility: CLINIC | Age: 58
End: 2018-12-28
Payer: COMMERCIAL

## 2018-12-28 PROCEDURE — 93970 EXTREMITY STUDY: CPT

## 2019-01-28 ENCOUNTER — APPOINTMENT (OUTPATIENT)
Dept: SURGERY | Facility: CLINIC | Age: 59
End: 2019-01-28
Payer: COMMERCIAL

## 2019-01-28 VITALS
DIASTOLIC BLOOD PRESSURE: 83 MMHG | HEIGHT: 74 IN | SYSTOLIC BLOOD PRESSURE: 125 MMHG | HEART RATE: 61 BPM | WEIGHT: 228 LBS | TEMPERATURE: 97.8 F | BODY MASS INDEX: 29.26 KG/M2 | OXYGEN SATURATION: 96 %

## 2019-01-28 PROCEDURE — 99214 OFFICE O/P EST MOD 30 MIN: CPT

## 2019-01-28 NOTE — ASSESSMENT
[FreeTextEntry1] : The patient is a 58 year old male with a stable umbilical hernia.  The patient at this time is going to continue with weight loss in an attempt to reduce the tension along the abdominal wall for eventual hernia repair.  The patient was educated about the signs and symptoms of hernia strangulation and that should this occur they should seek immediate MD evaluation.  The patient will follow up in 4 months or sooner should any problems or issues arise. \par

## 2019-01-28 NOTE — PHYSICAL EXAM
[JVD] : no jugular venous distention  [Abdominal Masses] : No abdominal masses [Abdomen Tenderness] : ~T ~M No abdominal tenderness [No Rash or Lesion] : No rash or lesion [Purpura] : no purpura  [Petechiae] : no petechiae [Skin Ulcer] : no ulcer [Skin Induration] : no induration [Alert] : alert [Oriented to Person] : oriented to person [Oriented to Place] : oriented to place [Oriented to Time] : oriented to time [Calm] : calm [de-identified] : non toxic, in no acute distress [de-identified] : NC/AT PERRL EOMI no scleral icterus [de-identified] : trachea midline with no gross mass  [de-identified] : no audible wheezing or stridor [de-identified] : the abdomen remains protuberant, soft, no localizing tenderness, no guarding, no rebound, no masses  [de-identified] : FROM of all extremities with no gross deformity or angulation, there remains a stable reducible umbilical hernia, there are no signs of ventral hernia, there is no evidence of recurrent inguinal hernias [de-identified] : a lower midline scar is noted and inguinal incisions consistent with the patient's past surgical history [de-identified] : mood is calm

## 2019-01-28 NOTE — HISTORY OF PRESENT ILLNESS
[de-identified] : The patient returns to the office with no new complaints.  He reports that he has lost weight based on his scale at home. The patient otherwise has no other complaints. The hernia has remained stable in size.

## 2019-01-29 ENCOUNTER — TRANSCRIPTION ENCOUNTER (OUTPATIENT)
Age: 59
End: 2019-01-29

## 2019-03-07 ENCOUNTER — INPATIENT (INPATIENT)
Facility: HOSPITAL | Age: 59
LOS: 0 days | Discharge: HOME | DRG: 310 | End: 2019-03-08
Attending: INTERNAL MEDICINE | Admitting: INTERNAL MEDICINE
Payer: COMMERCIAL

## 2019-03-07 VITALS — WEIGHT: 223.99 LBS | HEIGHT: 74 IN

## 2019-03-07 DIAGNOSIS — Z98.890 OTHER SPECIFIED POSTPROCEDURAL STATES: Chronic | ICD-10-CM

## 2019-03-07 DIAGNOSIS — I48.92 UNSPECIFIED ATRIAL FLUTTER: ICD-10-CM

## 2019-03-07 DIAGNOSIS — Z90.49 ACQUIRED ABSENCE OF OTHER SPECIFIED PARTS OF DIGESTIVE TRACT: Chronic | ICD-10-CM

## 2019-03-07 LAB
ALBUMIN SERPL ELPH-MCNC: 4.3 G/DL — SIGNIFICANT CHANGE UP (ref 3.3–5.2)
ALP SERPL-CCNC: 75 U/L — SIGNIFICANT CHANGE UP (ref 40–120)
ALT FLD-CCNC: 30 U/L — SIGNIFICANT CHANGE UP
ANION GAP SERPL CALC-SCNC: 12 MMOL/L — SIGNIFICANT CHANGE UP (ref 5–17)
ANION GAP SERPL CALC-SCNC: 13 MMOL/L — SIGNIFICANT CHANGE UP (ref 5–17)
APTT BLD: 32.4 SEC — SIGNIFICANT CHANGE UP (ref 27.5–36.3)
AST SERPL-CCNC: 28 U/L — SIGNIFICANT CHANGE UP
BASOPHILS # BLD AUTO: 0 K/UL — SIGNIFICANT CHANGE UP (ref 0–0.2)
BASOPHILS NFR BLD AUTO: 0.5 % — SIGNIFICANT CHANGE UP (ref 0–2)
BILIRUB SERPL-MCNC: 0.7 MG/DL — SIGNIFICANT CHANGE UP (ref 0.4–2)
BUN SERPL-MCNC: 14 MG/DL — SIGNIFICANT CHANGE UP (ref 8–20)
BUN SERPL-MCNC: 15 MG/DL — SIGNIFICANT CHANGE UP (ref 8–20)
CALCIUM SERPL-MCNC: 8.9 MG/DL — SIGNIFICANT CHANGE UP (ref 8.6–10.2)
CALCIUM SERPL-MCNC: 9.5 MG/DL — SIGNIFICANT CHANGE UP (ref 8.6–10.2)
CHLORIDE SERPL-SCNC: 107 MMOL/L — SIGNIFICANT CHANGE UP (ref 98–107)
CHLORIDE SERPL-SCNC: 109 MMOL/L — HIGH (ref 98–107)
CO2 SERPL-SCNC: 23 MMOL/L — SIGNIFICANT CHANGE UP (ref 22–29)
CO2 SERPL-SCNC: 24 MMOL/L — SIGNIFICANT CHANGE UP (ref 22–29)
CREAT SERPL-MCNC: 1.09 MG/DL — SIGNIFICANT CHANGE UP (ref 0.5–1.3)
CREAT SERPL-MCNC: 1.16 MG/DL — SIGNIFICANT CHANGE UP (ref 0.5–1.3)
EOSINOPHIL # BLD AUTO: 0 K/UL — SIGNIFICANT CHANGE UP (ref 0–0.5)
EOSINOPHIL NFR BLD AUTO: 0.8 % — SIGNIFICANT CHANGE UP (ref 0–5)
GLUCOSE SERPL-MCNC: 152 MG/DL — HIGH (ref 70–115)
GLUCOSE SERPL-MCNC: 95 MG/DL — SIGNIFICANT CHANGE UP (ref 70–115)
HCT VFR BLD CALC: 39 % — LOW (ref 42–52)
HGB BLD-MCNC: 13.2 G/DL — LOW (ref 14–18)
INR BLD: 1.04 RATIO — SIGNIFICANT CHANGE UP (ref 0.88–1.16)
LYMPHOCYTES # BLD AUTO: 2.1 K/UL — SIGNIFICANT CHANGE UP (ref 1–4.8)
LYMPHOCYTES # BLD AUTO: 33.1 % — SIGNIFICANT CHANGE UP (ref 20–55)
MAGNESIUM SERPL-MCNC: 2 MG/DL — SIGNIFICANT CHANGE UP (ref 1.6–2.6)
MCHC RBC-ENTMCNC: 29.2 PG — SIGNIFICANT CHANGE UP (ref 27–31)
MCHC RBC-ENTMCNC: 33.8 G/DL — SIGNIFICANT CHANGE UP (ref 32–36)
MCV RBC AUTO: 86.3 FL — SIGNIFICANT CHANGE UP (ref 80–94)
MONOCYTES # BLD AUTO: 0.6 K/UL — SIGNIFICANT CHANGE UP (ref 0–0.8)
MONOCYTES NFR BLD AUTO: 9 % — SIGNIFICANT CHANGE UP (ref 3–10)
NEUTROPHILS # BLD AUTO: 3.6 K/UL — SIGNIFICANT CHANGE UP (ref 1.8–8)
NEUTROPHILS NFR BLD AUTO: 56.4 % — SIGNIFICANT CHANGE UP (ref 37–73)
NT-PROBNP SERPL-SCNC: 1983 PG/ML — HIGH (ref 0–300)
PHOSPHATE SERPL-MCNC: 2.8 MG/DL — SIGNIFICANT CHANGE UP (ref 2.4–4.7)
PLATELET # BLD AUTO: 203 K/UL — SIGNIFICANT CHANGE UP (ref 150–400)
POTASSIUM SERPL-MCNC: 3.9 MMOL/L — SIGNIFICANT CHANGE UP (ref 3.5–5.3)
POTASSIUM SERPL-MCNC: 4.4 MMOL/L — SIGNIFICANT CHANGE UP (ref 3.5–5.3)
POTASSIUM SERPL-SCNC: 3.9 MMOL/L — SIGNIFICANT CHANGE UP (ref 3.5–5.3)
POTASSIUM SERPL-SCNC: 4.4 MMOL/L — SIGNIFICANT CHANGE UP (ref 3.5–5.3)
PROT SERPL-MCNC: 7.1 G/DL — SIGNIFICANT CHANGE UP (ref 6.6–8.7)
PROTHROM AB SERPL-ACNC: 12 SEC — SIGNIFICANT CHANGE UP (ref 10–12.9)
RBC # BLD: 4.52 M/UL — LOW (ref 4.6–6.2)
RBC # FLD: 13.1 % — SIGNIFICANT CHANGE UP (ref 11–15.6)
SODIUM SERPL-SCNC: 143 MMOL/L — SIGNIFICANT CHANGE UP (ref 135–145)
SODIUM SERPL-SCNC: 145 MMOL/L — SIGNIFICANT CHANGE UP (ref 135–145)
TROPONIN T SERPL-MCNC: <0.01 NG/ML — SIGNIFICANT CHANGE UP (ref 0–0.06)
TROPONIN T SERPL-MCNC: <0.01 NG/ML — SIGNIFICANT CHANGE UP (ref 0–0.06)
WBC # BLD: 6.3 K/UL — SIGNIFICANT CHANGE UP (ref 4.8–10.8)
WBC # FLD AUTO: 6.3 K/UL — SIGNIFICANT CHANGE UP (ref 4.8–10.8)

## 2019-03-07 PROCEDURE — 99255 IP/OBS CONSLTJ NEW/EST HI 80: CPT

## 2019-03-07 PROCEDURE — 99223 1ST HOSP IP/OBS HIGH 75: CPT | Mod: GC

## 2019-03-07 PROCEDURE — 71045 X-RAY EXAM CHEST 1 VIEW: CPT | Mod: 26

## 2019-03-07 PROCEDURE — 99291 CRITICAL CARE FIRST HOUR: CPT

## 2019-03-07 PROCEDURE — 93010 ELECTROCARDIOGRAM REPORT: CPT | Mod: 76

## 2019-03-07 RX ORDER — METOPROLOL TARTRATE 50 MG
5 TABLET ORAL EVERY 6 HOURS
Qty: 0 | Refills: 0 | Status: DISCONTINUED | OUTPATIENT
Start: 2019-03-07 | End: 2019-03-08

## 2019-03-07 RX ORDER — TAMSULOSIN HYDROCHLORIDE 0.4 MG/1
0.4 CAPSULE ORAL AT BEDTIME
Qty: 0 | Refills: 0 | Status: DISCONTINUED | OUTPATIENT
Start: 2019-03-07 | End: 2019-03-08

## 2019-03-07 RX ORDER — ENOXAPARIN SODIUM 100 MG/ML
100 INJECTION SUBCUTANEOUS ONCE
Qty: 0 | Refills: 0 | Status: DISCONTINUED | OUTPATIENT
Start: 2019-03-07 | End: 2019-03-07

## 2019-03-07 RX ORDER — PANTOPRAZOLE SODIUM 20 MG/1
40 TABLET, DELAYED RELEASE ORAL
Qty: 0 | Refills: 0 | Status: DISCONTINUED | OUTPATIENT
Start: 2019-03-07 | End: 2019-03-08

## 2019-03-07 RX ORDER — ENOXAPARIN SODIUM 100 MG/ML
100 INJECTION SUBCUTANEOUS EVERY 12 HOURS
Qty: 0 | Refills: 0 | Status: DISCONTINUED | OUTPATIENT
Start: 2019-03-07 | End: 2019-03-08

## 2019-03-07 NOTE — ED ADULT TRIAGE NOTE - CHIEF COMPLAINT QUOTE
'I was sent here from Dr John Cedillo for an abnormal EKG, I saw him because I wasn't feeling well. I had lithotripsy about 2 weeks ago and haven't been feeling well since then. I have been having SOB but I have been working every day, I get a little lightheaded. " Pt states he was on Flomax and was on Eliquis Aug/Sept/Oct for a blood clot in my leg but I saw Delos[facility]os and he said it was clear. ' Pt A & OX4.

## 2019-03-07 NOTE — CONSULT NOTE ADULT - SUBJECTIVE AND OBJECTIVE BOX
CARDIOLOGY CONSULTATION NOTE     History obtained by: Patient, family and medical record     obtained: No    PRIMARY CARDIOLOGIST: Dr. BURGESS    Chief complaint:    Patient is a 58y old  Male who presents with a chief complaint of Afl    HPI:    58M hx DVT, GERD,  s/p lithotripsy and just "hasn't felt right" since. He reports his symptoms as very minor with occasional lightheadedness., intermittent SOB presented to PMD today, had EKG which showed tachycardia and was told to present to ED. in ED, noted to have Aflutter with RVR. He doesn't feel his palpitations or irregular heart beats. Denied orthopnea  PND, edema, CP, nausea, vomiting, hematuria, melena ,syncope near syncope.       REVIEW OF SYMPTOMS:   Constitutional: Denied: fever, chills, weight loss or gain  Eyes: Denied: reddened ayes, eye discharge, eye pain  ENMT: Denied: ear pain, nasal discharge, mouth pain, throat pain or swelling  Cardiovascular: Denied: chest pain,  Chest pressure,, edema  Respiratory: Denied: cough, phlegm production, wheezes,, orthopnea, PND,   GI: Denied: Abdominal pain, nausea, vomiting, diarrhea, constipation, melena, rectal bleed  : Denied: hematuria, frequency  Musculoskeletal: Denied: Muscle aches, weakness, pain  Hematology/lymp: Denied: Bleeding disorder, anemia, blood clotting  Neuro: Denied: Headache, numbness, aphasia, dysarthria, seizure,  syncope, near syncope  Psych: Denied: depression, anxiety  Integumentary/skin: Denied: rash, bruises, ecchymosis, itching  Allergy/Immunology: denied environmental or drug allergies    ALL OTHER REVIEW OF SYSTEMS ARE NEGATIVE.  Patient Currently Takes Medications as of 07-Mar-2019 19:34 documented in Structured Notes  · 	tamsulosin 0.4 mg oral capsuleMEDICATIONS  (STANDING):    MEDICATIONS  (PRN):        PAST MEDICAL & SURGICAL HISTORY:  DVT (deep venous thrombosis)  GERD (gastroesophageal reflux disease)  H/O lithotripsy      FAMILY HISTORY:  No pertinent family history in first degree relatives      SOCIAL HISTORY:    CIGARETTES:  No    ALCOHOL: No    DRUGS: No    Vital Signs Last 24 Hrs  T(C): 36.6 (07 Mar 2019 19:08), Max: 36.6 (07 Mar 2019 19:08)  T(F): 97.9 (07 Mar 2019 19:08), Max: 97.9 (07 Mar 2019 19:08)  HR: 76 (07 Mar 2019 19:51) (76 - 138)  BP: 116/69 (07 Mar 2019 19:51) (116/69 - 137/85)  BP(mean): --  RR: 18 (07 Mar 2019 19:51) (16 - 18)  SpO2: 98% (07 Mar 2019 19:51) (97% - 98%)    PHYSICAL EXAM:      Constitutional: No fever, chills, NAD, Comfortable    Eyes: Not reddened, no discharge    ENMT: No discharge, No pain    Neck: No JVD, No Bruit    Back: No CVA tenderness    Respiratory: Clear to auscultation bilaterally    Cardiovascular: IRR, Normal S1-2, No S3-, No friction rub murmur    Gastrointestinal: Soft, NT/ND. BS+, No organomegaly    Extremities: No edema    Vascular: Distal pulses intact    Neurological: Alert, awake, oriented, able to move extremities, speach is clear    Skin: No ecchymosis, no rash    Musculoskeletal: Non tender, no weaknss    Psychiatric: Aproppriate mood, no anxiety        INTERPRETATION OF TELEMETRY:    ECG: Atrial Flutter NSST T abnl, VR 76bpm    I&O's Detail      LABS:                        13.2   6.3   )-----------( 203      ( 07 Mar 2019 19:50 )             39.0     03-07    143  |  107  |  15.0  ----------------------------<  95  4.4   |  24.0  |  1.09    Ca    9.5      07 Mar 2019 19:50    TPro  7.1  /  Alb  4.3  /  TBili  0.7  /  DBili  x   /  AST  28  /  ALT  30  /  AlkPhos  75  03-07    CARDIAC MARKERS ( 07 Mar 2019 19:50 )  x     / <0.01 ng/mL / x     / x     / x          PT/INR - ( 07 Mar 2019 19:50 )   PT: 12.0 sec;   INR: 1.04 ratio         PTT - ( 07 Mar 2019 19:50 )  PTT:32.4 sec    I&O's Summary

## 2019-03-07 NOTE — ED STATDOCS - PROGRESS NOTE DETAILS
59 y/o M pt with hx of DVT presents to ED c/o abnormal EKG. Is 2.5 weeks s/p lithotripsy and just "hasn't felt right" since. He reports his symptoms as very minor with occasional lightheadedness. Denies CP at this time. Presented to PMD today, had EKG which showed tachycardia and was told to present to ED. Had DVT last year; put on blood thinner; repeat doppler 3 months later showed clot had resolved. Not currently on blood thinners. EKG shows a-flutter. Exam shows patient is HR is faster than 119 BPM; will be transferred to critical for a-flutter.

## 2019-03-07 NOTE — H&P ADULT - NSHPLABSRESULTS_GEN_ALL_CORE
13.2   6.3   )-----------( 203      ( 07 Mar 2019 19:50 )             39.0   03-07    143  |  107  |  15.0  ----------------------------<  95  4.4   |  24.0  |  1.09    Ca    9.5      07 Mar 2019 19:50    TPro  7.1  /  Alb  4.3  /  TBili  0.7  /  DBili  x   /  AST  28  /  ALT  30  /  AlkPhos  75  03-07    Troponin T, Serum: <0.01 ng/mL (03.07.19 @ 19:50)    Thyroid Stimulating Hormone, Serum: 1.40 uIU/mL (03.07.19 @ 19:50)    PT/INR - ( 07 Mar 2019 19:50 )   PT: 12.0 sec;   INR: 1.04 ratio    PTT - ( 07 Mar 2019 19:50 )  PTT:32.4 sec    < from: Xray Chest 1 View- PORTABLE-Urgent (03.07.19 @ 20:14) >  Findings: The lungs are clear. The cardiomediastinal silhouette is normal. There are mild multilevel degenerative changes of the thoracic spine.    IMPRESSION: Clear lungs, unchanged.  < end of copied text >    EKG showing atrial flutter with variable AV block.

## 2019-03-07 NOTE — PHARMACOTHERAPY INTERVENTION NOTE - COMMENTS
Spoke to patient at bedside. Was on Eliquis in August through middle of October s/p DVT, was discontinued due to dissolution of clot after 3 months of therapy.
Rapid atrial fibrillation

## 2019-03-07 NOTE — ED PROVIDER NOTE - PHYSICAL EXAMINATION
Nahum George PGY4 : General: Well appearing, nontoxic, no acute distress; Head: Normocephalic Atraumatic; Eyes: PERRL, EOMI; ENT: Airway patent; Neck: supple; Chest: Lungs clear to auscultation bilateral; Cardiac: Regular rate and rhythm, no murmurs, rubs or gallops; Abdomen: soft, nontender, nondistended; no guarding or rebound; Musculoskeletal: Calves symmetric, nontender, no palpable cord; Skin: No rash, normal skin tone; Neuro: Alert and Oriented to person, place, and time; No focal deficit, CN 2-12 grossly intact

## 2019-03-07 NOTE — H&P ADULT - NSHPPHYSICALEXAM_GEN_ALL_CORE
Vital Signs Last 24 Hrs  T(C): 36.6 (07 Mar 2019 19:08), Max: 36.6 (07 Mar 2019 19:08)  T(F): 97.9 (07 Mar 2019 19:08), Max: 97.9 (07 Mar 2019 19:08)  HR: 76 (07 Mar 2019 23:14) (76 - 138)  BP: 102/63 (07 Mar 2019 23:14) (102/63 - 137/85)  RR: 18 (07 Mar 2019 23:14) (16 - 18)  SpO2: 95% (07 Mar 2019 23:14) (95% - 98%)    GENERAL: Patient is a middle-aged  male found resting comfortably in stretcher in no acute distress, well-groomed and well-developed, pleasant and cooperative.  HEENT: Normocephalic, atraumatic; EOMI, PEERL; moist mucous membranes.  RESPIRATORY: Normal respiratory rate and effort, lungs are clear to auscultation bilaterally, no wheezing, rales or rhonchi.  CARDIAC: Regular rate and rhythm, no murmurs, rubs or gallop  GI: Abdomen is soft, nontender, nondistended, bowel sounds are normal.  EXTREMITIES: No cyanosis, clubbing or edema.  NEUROLOGIC: Alert and oriented x4, no gross motor or sensory deficits.  PSYCH: Normal speech and affect, good eye contact.

## 2019-03-07 NOTE — ED PROVIDER NOTE - ATTENDING CONTRIBUTION TO CARE
The patient seen and examined.  The patient presents with new onset atrial flutter    Rapid atrial flutter    I, Elvis Abernathy, performed the initial face to face bedside interview with this patient regarding history of present illness, review of symptoms and relevant past medical, social and family history.  I completed an independent physical examination.  I was the initial provider who evaluated this patient. I have signed out the follow up of any pending tests (i.e. labs, radiological studies) to the resident.  I have communicated the patient’s plan of care and disposition with the resident.

## 2019-03-07 NOTE — ED ADULT NURSE NOTE - NSIMPLEMENTINTERV_GEN_ALL_ED
Implemented All Universal Safety Interventions:  [place] to call system. Call bell, personal items and telephone within reach. Instruct patient to call for assistance. Room bathroom lighting operational. Non-slip footwear when patient is off stretcher. Physically safe environment: no spills, clutter or unnecessary equipment. Stretcher in lowest position, wheels locked, appropriate side rails in place.

## 2019-03-07 NOTE — H&P ADULT - FAMILY HISTORY
Father  Still living? No  Family history of lung cancer, Age at diagnosis: Age Unknown     Mother  Still living? Unknown  Family history of COPD (chronic obstructive pulmonary disease), Age at diagnosis: Age Unknown

## 2019-03-07 NOTE — H&P ADULT - ATTENDING COMMENTS
Patient seen and examined at bedside.   Presents from home w/ SOB and light-headedness found to have A flutter w/ RVR in ED. PMH DVT (provoked) and GERD.   He received Lovnoex and Cardizem in ED. He is currently asymptomatic and without complaints.     T(C): 36.6 (03-07-19 @ 19:08), Max: 36.6 (03-07-19 @ 19:08)  HR: 76 (03-07-19 @ 23:14) (76 - 138)  BP: 102/63 (03-07-19 @ 23:14) (102/63 - 137/85)  RR: 18 (03-07-19 @ 23:14) (16 - 18)  SpO2: 95% (03-07-19 @ 23:14) (95% - 98%)  Wt(kg): --    Physical Exam:   GENERAL: well-groomed, well-developed, NAD  HEENT: head NC/AT; EOM intact, PERRLA, conjunctiva & sclera clear; hearing grossly intact, moist mucous membranes  NECK: supple, no JVD  RESPIRATORY: CTA B/L, no wheezing, rales, rhonchi or rubs  CARDIOVASCULAR: S1&S2, RRR, no murmurs or gallops  ABDOMEN: soft, non-tender, non-distended, + Bowel sounds x4 quadrants, no guarding, rebound or rigidity  MUSCULOSKELETAL:  no clubbing, cyanosis or edema of all 4 extremities  LYMPH: no cervical lymphadenopathy  VASCULAR: Radial pulses 2+ bilaterally, no varicose veins   SKIN: warm and dry, color normal  NEUROLOGIC: AA&O X3, CN2-12 intact w/ no focal deficits, no sensory loss, motor Strength 5/5 in UE & LE B/L  Psych: Normal mood and affect, normal behavior      Plan:   A flutter: NPO for possible MARINA and cardioversion tomorrow. Continue Lovenox. Rate control as ordered.   EP eval in AM.     Remainder of A and P as stated above. Patient seen and examined at bedside.   Presents from home w/ SOB and light-headedness found to have A flutter w/ RVR in ED. PMH DVT (provoked) and GERD.   He received Lovnoex and Cardizem in ED. He is currently asymptomatic and without complaints.     T(C): 36.6 (03-07-19 @ 19:08), Max: 36.6 (03-07-19 @ 19:08)  HR: 76 (03-07-19 @ 23:14) (76 - 138)  BP: 102/63 (03-07-19 @ 23:14) (102/63 - 137/85)  RR: 18 (03-07-19 @ 23:14) (16 - 18)  SpO2: 95% (03-07-19 @ 23:14) (95% - 98%)  Wt(kg): --    Physical Exam:   GENERAL: well-groomed, well-developed, NAD  HEENT: head NC/AT; EOM intact, PERRLA, conjunctiva & sclera clear; hearing grossly intact, moist mucous membranes  NECK: supple, no JVD  RESPIRATORY: CTA B/L, no wheezing, rales, rhonchi or rubs  CARDIOVASCULAR: S1&S2, irreg irreg, no murmurs or gallops  ABDOMEN: soft, non-tender, non-distended, + Bowel sounds x4 quadrants, no guarding, rebound or rigidity  MUSCULOSKELETAL:  no clubbing, cyanosis or edema of all 4 extremities  LYMPH: no cervical lymphadenopathy  VASCULAR: Radial pulses 2+ bilaterally, no varicose veins   SKIN: warm and dry, color normal  NEUROLOGIC: AA&O X3, CN2-12 intact w/ no focal deficits, no sensory loss, motor Strength 5/5 in UE & LE B/L  Psych: Normal mood and affect, normal behavior      Plan:   A flutter: NPO for possible MARINA and cardioversion tomorrow. Continue Lovenox. Rate control as ordered.   EP eval in AM.     Remainder of A and P as stated above.

## 2019-03-07 NOTE — ED ADULT NURSE NOTE - OBJECTIVE STATEMENT
pt a+ox3, sent to ED from PMD for abnormal EKG. pt reports flu like symptoms x3-4 days, pt denies recent fever or chills. pt in rapid flutter in ED, CCRN and MD @ bedside.

## 2019-03-07 NOTE — H&P ADULT - NSHPREVIEWOFSYSTEMS_GEN_ALL_CORE
Patient denies any chest pain, palpitations, nausea/vomiting, abdominal pain, diarrhea, constipation, fever, chills, LE swelling.

## 2019-03-07 NOTE — ED PROVIDER NOTE - OBJECTIVE STATEMENT
57yo m pmh GERD, DVT (s/p long plane ride, not on AC) p/w a flutter, pt saw pcp today found to have rapid heart beat in 140s with flutter pattern. Pt with intermittent shortness of breath for past few days, thought he had the flu. No known fevers or chills. No palpitations.

## 2019-03-07 NOTE — H&P ADULT - HISTORY OF PRESENT ILLNESS
Patient is a 58-year-old male with PMHx of GERD and DVT not on AC who complains of several days of shortness of breath that is present at rest, without aggravating, alleviating and accompanied by intermittent mild lightheadedness for which he consulted with his PMD and was found to have elevated heart rate with a flutter pattern for which he was prompted to come to the ED. Patient is a 58-year-old male with PMHx of GERD and DVT not on AC who complains of several days of shortness of breath that is present at rest, without aggravating, alleviating and accompanied by intermittent mild lightheadedness for which he consulted with his PMD and was found to have elevated heart rate with a flutter pattern for which he was prompted to come to the ED. He has no other complaints. Denies past hx of arrythmia.  In ED patient noted to be in A flutter, seen by cardiology, he got Lovenox and Cardizem and rate became controlled.

## 2019-03-07 NOTE — ED ADULT NURSE REASSESSMENT NOTE - NS ED NURSE REASSESS COMMENT FT1
Assumed pt care [id number]. Charting as noted. Pt lying in bed at this time. Pt denies CP/SOB, pt states he still feels "palpitations every now and then." VS as charted. Blood work sent as ordered, pt medicated as ordered. Pt educated on plan of care, able to successfully teach back plan of care to RN. RN will continue to reeducate pt during hospital stay.

## 2019-03-08 ENCOUNTER — TRANSCRIPTION ENCOUNTER (OUTPATIENT)
Age: 59
End: 2019-03-08

## 2019-03-08 VITALS
SYSTOLIC BLOOD PRESSURE: 126 MMHG | RESPIRATION RATE: 18 BRPM | DIASTOLIC BLOOD PRESSURE: 66 MMHG | TEMPERATURE: 97 F | OXYGEN SATURATION: 96 % | HEART RATE: 120 BPM

## 2019-03-08 LAB
HCV AB S/CO SERPL IA: 0.1 S/CO — SIGNIFICANT CHANGE UP (ref 0–0.79)
HCV AB SERPL-IMP: SIGNIFICANT CHANGE UP
TROPONIN T SERPL-MCNC: <0.01 NG/ML — SIGNIFICANT CHANGE UP (ref 0–0.06)

## 2019-03-08 PROCEDURE — 99223 1ST HOSP IP/OBS HIGH 75: CPT

## 2019-03-08 PROCEDURE — 84100 ASSAY OF PHOSPHORUS: CPT

## 2019-03-08 PROCEDURE — 80053 COMPREHEN METABOLIC PANEL: CPT

## 2019-03-08 PROCEDURE — 93662 INTRACARDIAC ECG (ICE): CPT

## 2019-03-08 PROCEDURE — 93623 PRGRMD STIMJ&PACG IV RX NFS: CPT

## 2019-03-08 PROCEDURE — 83735 ASSAY OF MAGNESIUM: CPT

## 2019-03-08 PROCEDURE — 93325 DOPPLER ECHO COLOR FLOW MAPG: CPT

## 2019-03-08 PROCEDURE — 83880 ASSAY OF NATRIURETIC PEPTIDE: CPT

## 2019-03-08 PROCEDURE — 86803 HEPATITIS C AB TEST: CPT

## 2019-03-08 PROCEDURE — 93005 ELECTROCARDIOGRAM TRACING: CPT

## 2019-03-08 PROCEDURE — 84484 ASSAY OF TROPONIN QUANT: CPT

## 2019-03-08 PROCEDURE — 99239 HOSP IP/OBS DSCHRG MGMT >30: CPT

## 2019-03-08 PROCEDURE — 84443 ASSAY THYROID STIM HORMONE: CPT

## 2019-03-08 PROCEDURE — 93657 TX L/R ATRIAL FIB ADDL: CPT

## 2019-03-08 PROCEDURE — 93306 TTE W/DOPPLER COMPLETE: CPT

## 2019-03-08 PROCEDURE — 96374 THER/PROPH/DIAG INJ IV PUSH: CPT

## 2019-03-08 PROCEDURE — 85027 COMPLETE CBC AUTOMATED: CPT

## 2019-03-08 PROCEDURE — 92960 CARDIOVERSION ELECTRIC EXT: CPT

## 2019-03-08 PROCEDURE — 99291 CRITICAL CARE FIRST HOUR: CPT | Mod: 25

## 2019-03-08 PROCEDURE — 93656 COMPRE EP EVAL ABLTJ ATR FIB: CPT

## 2019-03-08 PROCEDURE — 36415 COLL VENOUS BLD VENIPUNCTURE: CPT

## 2019-03-08 PROCEDURE — 93320 DOPPLER ECHO COMPLETE: CPT

## 2019-03-08 PROCEDURE — 80048 BASIC METABOLIC PNL TOTAL CA: CPT

## 2019-03-08 PROCEDURE — 93325 DOPPLER ECHO COLOR FLOW MAPG: CPT | Mod: 26

## 2019-03-08 PROCEDURE — 93306 TTE W/DOPPLER COMPLETE: CPT | Mod: 26

## 2019-03-08 PROCEDURE — 93613 INTRACARDIAC EPHYS 3D MAPG: CPT

## 2019-03-08 PROCEDURE — 93312 ECHO TRANSESOPHAGEAL: CPT | Mod: 26

## 2019-03-08 PROCEDURE — 99232 SBSQ HOSP IP/OBS MODERATE 35: CPT

## 2019-03-08 PROCEDURE — 93010 ELECTROCARDIOGRAM REPORT: CPT | Mod: 76

## 2019-03-08 PROCEDURE — 85730 THROMBOPLASTIN TIME PARTIAL: CPT

## 2019-03-08 PROCEDURE — 71045 X-RAY EXAM CHEST 1 VIEW: CPT

## 2019-03-08 PROCEDURE — 85610 PROTHROMBIN TIME: CPT

## 2019-03-08 PROCEDURE — 93320 DOPPLER ECHO COMPLETE: CPT | Mod: 26

## 2019-03-08 PROCEDURE — 93312 ECHO TRANSESOPHAGEAL: CPT

## 2019-03-08 RX ORDER — DRONEDARONE 400 MG/1
400 TABLET, FILM COATED ORAL
Qty: 0 | Refills: 0 | Status: DISCONTINUED | OUTPATIENT
Start: 2019-03-08 | End: 2019-03-08

## 2019-03-08 RX ORDER — APIXABAN 2.5 MG/1
1 TABLET, FILM COATED ORAL
Qty: 60 | Refills: 2
Start: 2019-03-08 | End: 2019-06-05

## 2019-03-08 RX ORDER — APIXABAN 2.5 MG/1
5 TABLET, FILM COATED ORAL EVERY 12 HOURS
Qty: 0 | Refills: 0 | Status: DISCONTINUED | OUTPATIENT
Start: 2019-03-08 | End: 2019-03-08

## 2019-03-08 RX ORDER — PANTOPRAZOLE SODIUM 20 MG/1
1 TABLET, DELAYED RELEASE ORAL
Qty: 30 | Refills: 0
Start: 2019-03-08 | End: 2019-04-06

## 2019-03-08 RX ORDER — ASPIRIN/CALCIUM CARB/MAGNESIUM 324 MG
325 TABLET ORAL ONCE
Qty: 0 | Refills: 0 | Status: COMPLETED | OUTPATIENT
Start: 2019-03-08 | End: 2019-03-08

## 2019-03-08 RX ORDER — PANTOPRAZOLE SODIUM 20 MG/1
1 TABLET, DELAYED RELEASE ORAL
Qty: 0 | Refills: 0 | COMMUNITY
Start: 2019-03-08

## 2019-03-08 RX ORDER — OMEPRAZOLE 10 MG/1
1 CAPSULE, DELAYED RELEASE ORAL
Qty: 0 | Refills: 0 | COMMUNITY

## 2019-03-08 RX ORDER — DRONEDARONE 400 MG/1
1 TABLET, FILM COATED ORAL
Qty: 60 | Refills: 1
Start: 2019-03-08 | End: 2019-05-06

## 2019-03-08 RX ADMIN — Medication 325 MILLIGRAM(S): at 14:51

## 2019-03-08 RX ADMIN — PANTOPRAZOLE SODIUM 40 MILLIGRAM(S): 20 TABLET, DELAYED RELEASE ORAL at 05:21

## 2019-03-08 RX ADMIN — ENOXAPARIN SODIUM 100 MILLIGRAM(S): 100 INJECTION SUBCUTANEOUS at 09:15

## 2019-03-08 NOTE — DISCHARGE NOTE ADULT - HOSPITAL COURSE
This is a 57y/o white male with PMHx GERD, DVT to RLE (previously on eliquis) now s/p MARINA with Dr. Aguirre, DCCV with Dr. Aaron r/t atrial flutter.  Pt currently denies chest pain, SOB, palpitations, dyspnea      Neuro: A&Ox4, neurologically intact, ROM intact  Pulm: CTAB, cough encouraged.  Cardiac: NSR 61bpm, S1,S2  Vascular: palpable pulses x 4 extremities,  +1 edema to BL feet Patient is a 58-year-old male with PMHx of GERD, DVT (after prolonged flight, s/p 3 months of Eliquis now discontinued) with new onset Atrial Flutter that was recognized outpatient. Pt on admission was given 1 dose of 25mg diltiazem IVP and started on PO diltiazem was noted to be rate controlled and hemodynamically stable. Troponin were negative x3. TSH noted to be normal. Pt was placed on full dose lovenox.  sBNP was noted to be elevated. Cardiology was consulted and Pt was made NPO and had a TTE with cardioversion. As per cardio pt had 200Joules x.  Pt was converted from atrial flutter to normal sinus rhythm. Pt instructed to continue on multaq and eliquis until ablation (in 2 weeks).    All electrolyte abnormalities were monitored carefully and repleted as necessary during this hospitalization. At the time of discharge patient was hemodynamically stable and amenable to all terms of discharge. The patient has received verbal instructions from myself regarding discharge plans.     Length of Discharge: 45MIN Patient is a 58-year-old male with PMHx of GERD, DVT (after prolonged flight, s/p 3 months of Eliquis now discontinued) with new onset Atrial Flutter that was recognized outpatient. Pt on admission was given 1 dose of 25mg diltiazem IVP and started on PO diltiazem was noted to be rate controlled and hemodynamically stable. Troponin were negative x3. TSH noted to be normal. Pt was placed on full dose lovenox.  sBNP was noted to be elevated. Cardiology was consulted and Pt was made NPO and had a TTE with cardioversion. As per cardio pt had 200Joules x.  Pt was converted from atrial flutter to normal sinus rhythm. Pt instructed to continue on multaq and eliquis until ablation (in 2 weeks).    All electrolyte abnormalities were monitored carefully and repleted as necessary during this hospitalization. At the time of discharge patient was hemodynamically stable and amenable to all terms of discharge. The patient has received verbal instructions from myself regarding discharge plans. see cardiology and medicine progress note for remainder of hospital course and plan of care,     Length of Discharge: 40 MIN

## 2019-03-08 NOTE — DISCHARGE NOTE ADULT - CARE PROVIDER_API CALL
Benjamin Aguirre)  Cardiovascular Disease  [address] [facility] [address], [address]  [place] [address]  Phone: [phone]  Fax: [phone]  Follow Up Time:     Dangelo Aaron)  Cardiovascular Disease; Internal Medicine  [address] [place] [address]  Phone: [phone]  Fax: [phone]  Follow Up Time: Benjamin Aguirre)  Cardiovascular Disease  [address] [facility], [address]  [place] [address]  Phone: [phone]  Fax: [phone]  Follow Up Time:     Dangelo Aaron)  Cardiovascular Disease; Internal Medicine  [address] [place] [address]  Phone: [phone]  Fax: [phone]  Follow Up Time:     John Cedillo MD, j  Phone: (   )    -  Fax: (   )    -  Follow Up Time:

## 2019-03-08 NOTE — PROGRESS NOTE ADULT - ASSESSMENT
1. atrial flutter, pt has a MARINA and was cardioverted to nsr  2. spoke to Dr Aaron, due to apcs, we started him on multaq for rhythm control for now  3. eliquis bid starting tonight. pt advised not to miss any doses  4. will see Dr aaron for ablation in 2 weeks  5. Can dc home today  Thank you

## 2019-03-08 NOTE — DISCHARGE NOTE ADULT - CARE PROVIDERS DIRECT ADDRESSES
[email or web address],DirectAddress_Unknown,DirectAddress_Unknown

## 2019-03-08 NOTE — DISCHARGE NOTE ADULT - PLAN OF CARE
Optimize Cardiac Function -stop taking your omeprazole; start taking your pantoprazole  -you will now be taking multaq (dronedarone) 400mg twice a day; which is an anti-arrythmic medication to prevent you from going back into an irregular heart rhythm until your ablation procedure in two weeks  -you will take eliquis 5mg by mouth twice a day until instructed by your cardiologist  -follow up with Dr. Aguirre and Dr. Aaron outpt to discuss and plan your ablation procedure  -IT IS VERY IMPORTANT TO NEVER DISCONTINUE ANY OF YOUR MEDICATIONS ESPECIALLY YOUR ELIQUIS AND YOUR MULTAQ  -Ablation to be scheduled in two weeks with Dr. Aaron During your stay you had a full cardiac workup for which you were found to be in Atrial Flutter. Cardiology was consulted and took you for a procedure called a MARINA where your were cardioverted to normal sinus.   IMPORTANT:  Please stop taking your omeprazole. Start taking your pantoprazole as prescribed.  You will now be taking multaq (dronedarone) 400mg twice a day, which is an anti-arrythmic medication to prevent you from going back into an irregular heart rhythm until your ablation procedure in two weeks.  Please take Eliquis 5mg by mouth twice a day until instructed by your cardiologist.  Please follow up with Dr. Aguirre and Dr. Aaron outpt to discuss and plan your ablation procedure.  IT IS VERY IMPORTANT TO NEVER DISCONTINUE ANY OF YOUR MEDICATIONS ESPECIALLY YOUR ELIQUIS AND YOUR MULTAQ.  -Ablation to be scheduled in two weeks with Dr. Aaron Stable. You have a history of GERD for which you were continued on antacids during your stay. Your omeprazole was discontinued and you were started on pantoprazole. Please take all your meds as prescribed. Resolved You have a history of DVT in the past. During your stay you were started on anticoagulation due to your Atrial Flutter. Please continue to take all your meds as prescribed.

## 2019-03-08 NOTE — PATIENT PROFILE ADULT - VISION (WITH CORRECTIVE LENSES IF THE PATIENT USUALLY WEARS THEM):
Partially impaired: cannot see medication labels or newsprint, but can see obstacles in path, and the surrounding layout; can count fingers at arm's length/"nearsighted"

## 2019-03-08 NOTE — CONSULT NOTE ADULT - ASSESSMENT
57 yo M p/w new-onset typical atrial flutter w/RVR, responded well to rate control with cardizem.     Pt represents a good candidate (and class I indication) for catheter ablation of his arrhythmia (with goal of cure).    Plan:  1. For MARINA/DCCV today (per Dr. Aguirre)  2. Initiate OAC post-cardioversion  3. F/u as outpatient in 2 weeks to schedule elective catheter ablation  4. May continue CCB for rate control for now through ablation

## 2019-03-08 NOTE — DISCHARGE NOTE ADULT - ADDITIONAL INSTRUCTIONS
Follow up with Dr. Aaron and Dr. Aguirre Follow up with Dr. Aaron and Dr. Aguirre in 2-5 days of discharge for further recommendations/ adjustment and to make appointment for ablation therapy.   Please continue to take all medications as prescribed and follow up with your PCP as well in 2-3 days of discharge.

## 2019-03-08 NOTE — PROGRESS NOTE ADULT - ASSESSMENT
Patient is a 58-year-old male with PMHx of GERD, DVT (after prolonged flight, s/p 3 months of Eliquis now discontinued) with new onset Atrial Flutter, currently rate controlled, hemodynamically stable.    Atrial flutter  S/p x1 dose of 25mg diltiazem IVP and started on PO diltiazem, now rate controlled. Will continue CCB and PRN BB for RVR as per Cardio.  Negative troponin x3.  Elevated sBNP  f/u TTE, NPO after midnight for possible MARINA and cardioversion.  Patient on AC with full dose Lovenox.  Serial EKG.  Normal TSH.  Cardiology recommendations appreciated.    GERD  Continue PPI    Prophylactic measure  Patient on full dose AC.  VCD boots. Patient is a 58-year-old male with PMHx of GERD, DVT (after prolonged flight, s/p 3 months of Eliquis now discontinued) with new onset Atrial Flutter, currently rate controlled, hemodynamically stable.    Atrial flutter  S/p x1 dose of 25mg diltiazem IVP and started on PO diltiazem, now rate controlled. Will continue CCB and PRN BB for RVR as per Cardio.  Negative troponin x3.  Elevated sBNP  f/u TTE, NPO after midnight for possible MARINA and cardioversion.  Patient on AC with full dose Lovenox.  Serial EKG.  Normal TSH.  Cardiology recommendations appreciated.    GERD  Continue PPI    Prophylactic measure  Patient on full dose AC.  VCD boots.    plan for possible dc home today after cardioversion if medically stable. Patient is a 58-year-old male with PMHx of GERD, DVT (after prolonged flight, s/p 3 months of Eliquis now discontinued) with new onset Atrial Flutter, currently rate controlled, hemodynamically stable.    Atrial flutter  S/p x1 dose of 25mg diltiazem IVP and started on PO diltiazem, now rate controlled. Will continue CCB and PRN BB for RVR as per Cardio.  Negative troponin x3.  Elevated sBNP  f/u TTE, NPO after midnight for possible MARINA and cardioversion.  Patient on AC with full dose Lovenox.  Serial EKG.  Normal TSH.  Cardiology recommendations appreciated.    GERD  Continue PPI    Prophylactic measure  Patient on full dose AC.  VCD boots.    plan for possible dc home today after cardioversion if medically stable.  plan for ablation in 2 weeks.

## 2019-03-08 NOTE — DISCHARGE NOTE ADULT - FINDINGS/TREATMENT
200Joules x1 pt converted from atrial flutter to normal sinus rhythm; cont. multaq and eliquis until ablation (in 2 weeks)

## 2019-03-08 NOTE — CONSULT NOTE ADULT - SUBJECTIVE AND OBJECTIVE BOX
Mr. Garcia is a very pleasant 58-year-old  for [employer] with a PMHx of GERD and DVT not on AC who presented to the ER in typical atrial flutter. He states he recently underwent lithotripsy and since then he has "not felt well." This did not limit his performance at work, but he sensed something wasn't right. He went to his PCP yesterday and was noted to be in atrial flutter with RVR and was sent to the ER for further management.     Electrophysiology consultation was requested for further management considerations of his atrial flutter. Pt currently in atrial flutter with controlled rates and denies any CP, SOB, orthopnea/PND/LE edema, palpitations, LH/dizziness/syncope, or n/v/dizziness. He notes he had an episode of palpitations 3 years ago and had undergone workup by his primary cardiologist which was unrevealing and he did not have any recurrence since then.       PAST MEDICAL & SURGICAL HISTORY:  DVT (deep venous thrombosis)  GERD (gastroesophageal reflux disease)  History of umbilical hernia repair  History of appendectomy  History of lithotripsy      FAMILY HISTORY:  Negative for any inherited arrhythmias, cardiomyopathies, or SCD      SOCIAL HISTORY:  Denies tobacco/EtOH/IVDU    Works as  for [employer]      REVIEW OF SYSTEMS:  Constitutional- No fever, weight loss, or fatigue  HEENT- No eye pain, visual disturbances, or discharge; no difficulty hearing, tinnitus, vertigo; No sinus or throat pain; no pain or stiffness  Breasts- No pain, masses, or nipple discharge  Pulmonary- No cough, wheezing, chills or hemoptysis; No shortness of breath  Cardiovascular- as per HPI  Gastrointestinal- No abdominal or epigastric pain. No nausea, vomiting, or hematemesis; No diarrhea or constipation. No melena or hematochezia.  Genitourinary- No dysuria, frequency, hematuria, or incontinence  Neurological- No headaches, memory loss, loss of strength, numbness, or tremors  Dermatological- No itching, burning, rashes, or lesions   Lymphatics- No enlarged glands  Endocrine- No heat or cold intolerance; No hair loss  Musculoskeletal- No joint pain or swelling; No muscle, back, or extremity pain  Psychiatric- No depression, anxiety, mood swings, or difficulty sleeping  Hematologic- No easy bruising, or bleeding gums  Allergy & Immunology- No hives or eczema      ALLERGIES:  No Known Allergies      MEDICATIONS  (STANDING):  diltiazem    Tablet 60 milliGRAM(s) Oral every 6 hours  enoxaparin Injectable 100 milliGRAM(s) SubCutaneous every 12 hours  pantoprazole    Tablet 40 milliGRAM(s) Oral before breakfast  tamsulosin 0.4 milliGRAM(s) Oral at bedtime    MEDICATIONS  (PRN):  metoprolol tartrate Injectable 5 milliGRAM(s) IV Push every 6 hours PRN For HR >130, Hold for SBP <100mmHg      Vital Signs Last 24 Hrs  T(C): 36.6 (08 Mar 2019 09:57), Max: 36.6 (07 Mar 2019 19:08)  T(F): 97.8 (08 Mar 2019 09:57), Max: 97.9 (07 Mar 2019 19:08)  HR: 93 (08 Mar 2019 09:57) (75 - 138)  BP: 117/65 (08 Mar 2019 09:57) (102/63 - 137/85)  BP(mean): --  RR: 18 (08 Mar 2019 09:57) (16 - 18)  SpO2: 93% (08 Mar 2019 05:17) (93% - 98%)      PHYSICAL EXAM:  Constitutional- AAOx3, NAD  Neck: supple, No JVD  Cardiovascular: +S1S2 RR, irr rhythm, no murmurs, rubs, gallops   Pulmonary: CTA b/l, unlabored, no wheezes, rales, or rhonchi  Abdomen: +BS, soft NTND  Extremities: no edema b/l, +distal pulses b/l  Neuro: non focal, speech clear, CARMICHAEL x 4  Psych: appropriate mood & affect      LABS:                        13.2   6.3   )-----------( 203      ( 07 Mar 2019 19:50 )             39.0     03-07    145  |  109<H>  |  14.0  ----------------------------<  152<H>  3.9   |  23.0  |  1.16    Ca    8.9      07 Mar 2019 23:24  Phos  2.8     03-07  Mg     2.0     03-07    TPro  7.1  /  Alb  4.3  /  TBili  0.7  /  DBili  x   /  AST  28  /  ALT  30  /  AlkPhos  75  03-07  PT/INR - ( 07 Mar 2019 19:50 )   PT: 12.0 sec;   INR: 1.04 ratio    PTT - ( 07 Mar 2019 19:50 )  PTT:32.4 sec  CARDIAC MARKERS ( 08 Mar 2019 05:50 )  x     / <0.01 ng/mL / x     / x     / x      CARDIAC MARKERS ( 07 Mar 2019 23:24 )  x     / <0.01 ng/mL / x     / x     / x      CARDIAC MARKERS ( 07 Mar 2019 19:50 )  x     / <0.01 ng/mL / x     / x     / x          CARDIAC IMAGING/TESTING:  TTE 5/26/16 revealed normal LV systolic function (EF 55-60%), grade I diastolic dysfunction, biatrial enlargement with mild mitral regurgitation and aortic root dilation (4.5 cm at the sinuses of Valsalva).   ETT 6/19/15 demonstrated 13 MET exercise capacity and negative for ischemia      EP DATA:  Admission EKG with typical counterclockwise atrial flutter (VYA=829 ms) w/variable AV conduction ( bpm)  Telemetry reveals atrial flutter with controlled ventricular response to the 60s-70s

## 2019-03-08 NOTE — DISCHARGE NOTE ADULT - PATIENT PORTAL LINK FT
You can access the [facility] Patient Portal, offered by [facility], by registering with the following website: [email or web address]

## 2019-03-08 NOTE — PROGRESS NOTE ADULT - SUBJECTIVE AND OBJECTIVE BOX
CHIEF COMPLAINT:Patient is a 58y old  Male who presents with a chief complaint of New onset Atrial flutter (08 Mar 2019 11:53)    INTERVAL HISTORY:  pt seen and examined this am.  No cp or sob. Atrial flutter, prior hx of afib.  no n/v/d.     Allergies  No Known Allergies  	  MEDICATIONS:  dronedarone 400 milliGRAM(s) Oral two times a day  tamsulosin 0.4 milliGRAM(s) Oral at bedtime  pantoprazole    Tablet 40 milliGRAM(s) Oral before breakfast  apixaban 5 milliGRAM(s) Oral every 12 hours    PHYSICAL EXAM:  T(C): 36.3 (03-08-19 @ 11:38), Max: 36.6 (03-07-19 @ 19:08)  HR: 120 (03-08-19 @ 11:38) (75 - 138)  BP: 126/66 (03-08-19 @ 11:38) (102/63 - 137/85)  RR: 18 (03-08-19 @ 11:38) (16 - 18)  SpO2: 96% (03-08-19 @ 11:38) (93% - 98%)    Appearance: Normal	  HEENT:   NC/AT  Eye: Pink Conjunctiva  Lungs: CTA B/L  CVS: IRRR, Normal S1 and S2, No Edema  Pulses: Normal distal pulses.  Neuro: A&O x3      LABS:	 	                        13.2   6.3   )-----------( 203      ( 07 Mar 2019 19:50 )             39.0     03-07    145  |  109<H>  |  14.0  ----------------------------<  152<H>  3.9   |  23.0  |  1.16    Ca    8.9      07 Mar 2019 23:24  Phos  2.8     03-07  Mg     2.0     03-07    TPro  7.1  /  Alb  4.3  /  TBili  0.7  /  DBili  x   /  AST  28  /  ALT  30  /  AlkPhos  75  03-07    proBNP: Serum Pro-Brain Natriuretic Peptide: 1983 pg/mL (03-07 @ 23:25)    TSH: Thyroid Stimulating Hormone, Serum: 1.40 uIU/mL (03-07 @ 19:50)      ASSESSMENT/PLAN:

## 2019-03-08 NOTE — DISCHARGE NOTE ADULT - PROVIDER TOKENS
PROVIDER:[TOKEN:[[id number]]],PROVIDER:[TOKEN:[[id number]]] PROVIDER:[TOKEN:[[id number]]],PROVIDER:[TOKEN:[[id number]]],FREE:[LAST:[John Cedillo MD],FIRST:[j],PHONE:[(   )    -],FAX:[(   )    -]]

## 2019-03-08 NOTE — DISCHARGE NOTE ADULT - OTHER SIGNIFICANT FINDINGS
Vital Signs Last 24 Hrs  T(C): 36.3 (08 Mar 2019 11:38), Max: 36.6 (07 Mar 2019 19:08)  T(F): 97.4 (08 Mar 2019 11:38), Max: 97.9 (07 Mar 2019 19:08)  HR: 120 (08 Mar 2019 11:38) (75 - 138)  BP: 126/66 (08 Mar 2019 11:38) (102/63 - 137/85)  RR: 18 (08 Mar 2019 11:38) (16 - 18)  SpO2: 96% (08 Mar 2019 11:38) (93% - 98%)

## 2019-03-08 NOTE — PROGRESS NOTE ADULT - SUBJECTIVE AND OBJECTIVE BOX
This is a 59y/o white male with PMHx GERD, DVT to RLE (previously on eliquis) presents today for MARINA with Dr. Aguirre, DCCV with Dr. Aaron r/t atrial flutter. Pt denies chest pain, h/o cardiac arrhythmias; endorses palpitations and shortness of breath.      Neuro: A&Ox4, neurologically intact, ROM intact  Pulm: CTAB  Cardiac: aflutter 120bpm on monitor  Vascular: palpable pulses x 4 extremities,  +1 edema to BL feet    -consent   -MARINA ordered  -procedure d/w pt and spouse at bedside; risks and benefits explained; questions answered

## 2019-03-08 NOTE — PROGRESS NOTE ADULT - SUBJECTIVE AND OBJECTIVE BOX
Patient is a 58y old  Male who presents with a chief complaint of New onset Atrial flutter (07 Mar 2019 23:06)    INTERVAL/OVERNIGHT EVENTS  No acute overnight events.  Patient denies any new complaints or concerns.    ROS: No chest pain, palpitations, nausea/vomiting, abdominal pain, diarrhea, constipation, fever, chills, LE swelling    Vital Signs Last 24 Hrs  T(C): 36.4 (08 Mar 2019 05:17), Max: 36.6 (07 Mar 2019 19:08)  T(F): 97.5 (08 Mar 2019 05:17), Max: 97.9 (07 Mar 2019 19:08)  HR: 75 (08 Mar 2019 05:17) (75 - 138)  BP: 120/74 (08 Mar 2019 05:17) (102/63 - 137/85)  RR: 18 (08 Mar 2019 05:17) (16 - 18)  SpO2: 93% (08 Mar 2019 05:17) (93% - 98%)    Telemetry: AFlutter, mostly rate control, occasional tachycardia with patient's movement.    GENERAL: Patient in no acute distress found resting comfortably in bed  HEENT: Normocephalic, atraumatic; EOMI, PEERL; moist mucous membranes.  RESPIRATORY: Normal respiratory rate and effort, lungs are clear to auscultation bilaterally, no wheezing, rales or rhonchi.  CARDIAC: Irregular rhythm, no murmurs, rubs or gallop  GI: Abdomen is soft, nontender, nondistended, bowel sounds are normal.  EXTREMITIES: No cyanosis, clubbing or edema.  NEUROLOGIC: Alert and oriented x4, no gross motor or sensory deficits.  PSYCH: Normal speech and affect, good eye contact.    Serum Pro-Brain Natriuretic Peptide: 1983 pg/mL (03.07.19 @ 23:25)  Troponin T, Serum: <0.01 ng/mL (03.08.19 @ 05:50)  Troponin T, Serum: <0.01 ng/mL (03.07.19 @ 23:24)    MEDICATIONS  (STANDING):  diltiazem    Tablet 60 milliGRAM(s) Oral every 6 hours  enoxaparin Injectable 100 milliGRAM(s) SubCutaneous every 12 hours  pantoprazole    Tablet 40 milliGRAM(s) Oral before breakfast  tamsulosin 0.4 milliGRAM(s) Oral at bedtime    MEDICATIONS  (PRN):  metoprolol tartrate Injectable 5 milliGRAM(s) IV Push every 6 hours PRN For HR >130, Hold for SBP <100mmHg

## 2019-03-08 NOTE — DISCHARGE NOTE ADULT - CARE PLAN
Principal Discharge DX:	Atrial flutter with rapid ventricular response  Goal:	Optimize Cardiac Function  Assessment and plan of treatment:	-stop taking your omeprazole; start taking your pantoprazole  -you will now be taking multaq (dronedarone) 400mg twice a day; which is an anti-arrythmic medication to prevent you from going back into an irregular heart rhythm until your ablation procedure in two weeks  -you will take eliquis 5mg by mouth twice a day until instructed by your cardiologist  -follow up with Dr. Aguirre and Dr. Aaron outpt to discuss and plan your ablation procedure  -IT IS VERY IMPORTANT TO NEVER DISCONTINUE ANY OF YOUR MEDICATIONS ESPECIALLY YOUR ELIQUIS AND YOUR MULTAQ  -Ablation to be scheduled in two weeks with Dr. Aaron Principal Discharge DX:	Atrial flutter with rapid ventricular response  Goal:	Optimize Cardiac Function  Assessment and plan of treatment:	-stop taking your omeprazole; start taking your pantoprazole  -you will now be taking multaq (dronedarone) 400mg twice a day; which is an anti-arrythmic medication to prevent you from going back into an irregular heart rhythm until your ablation procedure in two weeks  -you will take eliquis 5mg by mouth twice a day until instructed by your cardiologist  -follow up with Dr. Aguirre and Dr. Aaron outpt to discuss and plan your ablation procedure  -IT IS VERY IMPORTANT TO NEVER DISCONTINUE ANY OF YOUR MEDICATIONS ESPECIALLY YOUR ELIQUIS AND YOUR MULTAQ  -Ablation to be scheduled in two weeks with Dr. Aaron  Secondary Diagnosis:	GERD (gastroesophageal reflux disease)  Secondary Diagnosis:	DVT (deep venous thrombosis) Principal Discharge DX:	Atrial flutter with rapid ventricular response  Goal:	Optimize Cardiac Function  Assessment and plan of treatment:	During your stay you had a full cardiac workup for which you were found to be in Atrial Flutter. Cardiology was consulted and took you for a procedure called a MARINA where your were cardioverted to normal sinus.   IMPORTANT:  Please stop taking your omeprazole. Start taking your pantoprazole as prescribed.  You will now be taking multaq (dronedarone) 400mg twice a day, which is an anti-arrythmic medication to prevent you from going back into an irregular heart rhythm until your ablation procedure in two weeks.  Please take Eliquis 5mg by mouth twice a day until instructed by your cardiologist.  Please follow up with Dr. Aguirre and Dr. Aaron outpt to discuss and plan your ablation procedure.  IT IS VERY IMPORTANT TO NEVER DISCONTINUE ANY OF YOUR MEDICATIONS ESPECIALLY YOUR ELIQUIS AND YOUR MULTAQ.  -Ablation to be scheduled in two weeks with Dr. Aaron  Secondary Diagnosis:	GERD (gastroesophageal reflux disease)  Goal:	Stable.  Assessment and plan of treatment:	You have a history of GERD for which you were continued on antacids during your stay. Your omeprazole was discontinued and you were started on pantoprazole. Please take all your meds as prescribed.  Secondary Diagnosis:	DVT (deep venous thrombosis)  Goal:	Resolved  Assessment and plan of treatment:	You have a history of DVT in the past. During your stay you were started on anticoagulation due to your Atrial Flutter. Please continue to take all your meds as prescribed.

## 2019-03-08 NOTE — ED ADULT NURSE REASSESSMENT NOTE - NS ED NURSE REASSESS COMMENT FT1
Pt handed off to BILL Patrick in stable condition. Pt placed on cardiac monitor, oriented to unit, plan of care explained. No apparent distress noted at this time.

## 2019-03-08 NOTE — DISCHARGE NOTE ADULT - MEDICATION SUMMARY - MEDICATIONS TO TAKE
I will START or STAY ON the medications listed below when I get home from the hospital:    tamsulosin 0.4 mg oral capsule  -- 1 cap(s) by mouth once a day  -- Indication: For BPH/ urinary blood flow    dronedarone 400 mg oral tablet  -- 1 tab(s) by mouth 2 times a day  -- Indication: For Heart rhythm/ a flutter    apixaban 5 mg oral tablet  -- 1 tab(s) by mouth every 12 hours  -- Indication: For blood thinner/ a flutter    pantoprazole 40 mg oral delayed release tablet  -- 1 tab(s) by mouth once a day (before a meal)  -- Indication: For stomach protection I will START or STAY ON the medications listed below when I get home from the hospital:    tamsulosin 0.4 mg oral capsule  -- 1 cap(s) by mouth once a day  -- Indication: For BPH/ urinary blood flow    dronedarone 400 mg oral tablet  -- 1 tab(s) by mouth 2 times a day  -- Indication: For Heart rhythm/ a flutter    apixaban 5 mg oral tablet  -- 1 tab(s) by mouth every 12 hours  -- Indication: For blood thinner/ a flutter    pantoprazole 40 mg oral delayed release tablet  -- 1 tab(s) by mouth once a day (before a meal)  -- Indication: For stomach protection    pantoprazole 40 mg oral delayed release tablet  -- 1 tab(s) by mouth once a day (before a meal)  -- Indication: For Antacid

## 2019-03-18 NOTE — CONSULT NOTE ADULT - ASSESSMENT
Pt up walking halls with this RN, pt tolerated well, walked 1/2 of 3rd floor on RA and tolerated well. Pt states she feels good & that she is doing better. 58M hx DVT, GERD,  s/p lithotripsy and just "hasn't felt right" since. He reports his symptoms as very minor with occasional lightheadedness., intermittent SOB presented to PMD today, had EKG which showed tachycardia and was told to present to ED. in ED, noted to have Aflutter with RVR. He doesn't feel his palpitations or irregular heart beats. Denied orthopnea  PND, edema, CP, nausea, vomiting, hematuria, melena ,syncope near syncope.       Atrial Flutter  Newly diagnosed  Lightheadedness  SOB    pBNP, Serial CE, EKG, Echo, TFTs  Telemetry monitoring  Anticoagulate  rate Control: Cont PO CB, PRN IV BB  Echo   NPO after MN  EP Eval    Primary Cardiologist Dr. Aguirre to follow tomorrow

## 2019-04-11 ENCOUNTER — NON-APPOINTMENT (OUTPATIENT)
Age: 59
End: 2019-04-11

## 2019-04-11 ENCOUNTER — APPOINTMENT (OUTPATIENT)
Dept: CARDIOLOGY | Facility: CLINIC | Age: 59
End: 2019-04-11
Payer: COMMERCIAL

## 2019-04-11 VITALS
BODY MASS INDEX: 28.75 KG/M2 | SYSTOLIC BLOOD PRESSURE: 131 MMHG | RESPIRATION RATE: 20 BRPM | HEART RATE: 79 BPM | DIASTOLIC BLOOD PRESSURE: 89 MMHG | WEIGHT: 224 LBS | HEIGHT: 74 IN | OXYGEN SATURATION: 95 %

## 2019-04-11 VITALS — SYSTOLIC BLOOD PRESSURE: 130 MMHG | DIASTOLIC BLOOD PRESSURE: 80 MMHG

## 2019-04-11 PROCEDURE — 93000 ELECTROCARDIOGRAM COMPLETE: CPT

## 2019-04-11 PROCEDURE — 99214 OFFICE O/P EST MOD 30 MIN: CPT

## 2019-04-11 RX ORDER — METHOCARBAMOL 750 MG/1
750 TABLET, FILM COATED ORAL
Qty: 30 | Refills: 0 | Status: DISCONTINUED | COMMUNITY
Start: 2018-08-09 | End: 2019-04-11

## 2019-04-11 RX ORDER — ACYCLOVIR 50 MG/G
5 OINTMENT TOPICAL
Qty: 30 | Refills: 0 | Status: DISCONTINUED | COMMUNITY
Start: 2018-02-21 | End: 2019-04-11

## 2019-04-11 RX ORDER — IBUPROFEN 800 MG/1
800 TABLET, FILM COATED ORAL
Qty: 28 | Refills: 0 | Status: DISCONTINUED | COMMUNITY
Start: 2018-08-09 | End: 2019-04-11

## 2019-04-11 NOTE — PHYSICAL EXAM
[Well Groomed] : well groomed [No Deformities] : no deformities [Normal Conjunctiva] : the conjunctiva exhibited no abnormalities [General Appearance - In No Acute Distress] : no acute distress [Normal Oral Mucosa] : normal oral mucosa [Eyelids - No Xanthelasma] : the eyelids demonstrated no xanthelasmas [No Oral Pallor] : no oral pallor [Normal Jugular Venous V Waves Present] : normal jugular venous V waves present [Normal Oropharynx] : normal oropharynx [Auscultation Breath Sounds / Voice Sounds] : lungs were clear to auscultation bilaterally [Respiration, Rhythm And Depth] : normal respiratory rhythm and effort [Heart Sounds] : normal S1 and S2 [Arterial Pulses Normal] : the arterial pulses were normal [Veins - Varicosity Changes] : no varicosital changes were noted in the lower extremities [Edema] : no peripheral edema present [Bowel Sounds] : normal bowel sounds [Abdomen Soft] : soft [FreeTextEntry1] : IRRR. [Abnormal Walk] : normal gait [Gait - Sufficient For Exercise Testing] : the gait was sufficient for exercise testing [Abdomen Tenderness] : non-tender [Nail Clubbing] : no clubbing of the fingernails [Cyanosis, Localized] : no localized cyanosis [Petechial Hemorrhages (___cm)] : no petechial hemorrhages [Oriented To Time, Place, And Person] : oriented to person, place, and time [Skin Turgor] : normal skin turgor [Skin Color & Pigmentation] : normal skin color and pigmentation [Impaired Insight] : insight and judgment were intact

## 2019-04-11 NOTE — ASSESSMENT
[FreeTextEntry1] : fib-flutter.\par cont with multaq\par add atenolol for rate control\par ablation as per dr ortega, pt has appt next week\par FU once ablation completed.

## 2019-04-11 NOTE — REVIEW OF SYSTEMS
[Headache] : no headache [Recent Weight Gain (___ Lbs)] : no recent weight gain [Feeling Fatigued] : not feeling fatigued [Blurry Vision] : no blurred vision [Recent Weight Loss (___ Lbs)] : no recent weight loss [Eyeglasses] : currently wearing eyeglasses [Earache] : no earache [Eye Pain] : no eye pain [Discharge From The Ears] : no discharge from the ears [Sore Throat] : no sore throat [Mouth Sores] : no mouth sores [Dyspnea on exertion] : not dyspnea during exertion [Chest Pain] : no chest pain [Lower Ext Edema] : no extremity edema [Palpitations] : no palpitations [Cough] : no cough [Wheezing] : no wheezing [Nausea] : no nausea [Heartburn] : no heartburn [Vomiting] : no vomiting [Change in Appetite] : no change in appetite [Change In The Stool] : no change in stool [Urinary Frequency] : no change in urinary frequency [Hematuria] : no hematuria [Impotence] : no impotence [Nocturia] : no nocturia [Joint Stiffness] : no joint stiffness [Joint Swelling] : no joint swelling [Joint Pain] : no joint pain [Muscle Cramps] : no muscle cramps [Limb Weakness (Paresis)] : no limb weakness [Skin: A Rash] : no rash: [Convulsions] : no convulsions [Skin Lesions] : no skin lesions [Dizziness] : no dizziness [Confusion] : no confusion was observed [Anxiety] : no anxiety [Excessive Thirst] : no polydipsia [Easy Bleeding] : no tendency for easy bleeding [Swollen Glands] : no swollen glands [Easy Bruising] : no tendency for easy bruising

## 2019-04-11 NOTE — HISTORY OF PRESENT ILLNESS
[FreeTextEntry1] : 59 yo male who was at [facility] last month with atrial flutter. \par He was cardioverted to nsr. \par he is on multaq and also eliquis.\par he has no more palpitations.\par He will need to have ablation.

## 2019-04-15 ENCOUNTER — APPOINTMENT (OUTPATIENT)
Dept: SURGERY | Facility: CLINIC | Age: 59
End: 2019-04-15
Payer: COMMERCIAL

## 2019-04-15 VITALS
DIASTOLIC BLOOD PRESSURE: 69 MMHG | HEIGHT: 74 IN | RESPIRATION RATE: 16 BRPM | WEIGHT: 226 LBS | OXYGEN SATURATION: 98 % | BODY MASS INDEX: 29 KG/M2 | TEMPERATURE: 97.4 F | SYSTOLIC BLOOD PRESSURE: 108 MMHG | HEART RATE: 71 BPM

## 2019-04-15 PROCEDURE — 99214 OFFICE O/P EST MOD 30 MIN: CPT

## 2019-04-16 ENCOUNTER — APPOINTMENT (OUTPATIENT)
Dept: ELECTROPHYSIOLOGY | Facility: CLINIC | Age: 59
End: 2019-04-16
Payer: COMMERCIAL

## 2019-04-16 VITALS
SYSTOLIC BLOOD PRESSURE: 116 MMHG | HEIGHT: 74 IN | WEIGHT: 225 LBS | DIASTOLIC BLOOD PRESSURE: 74 MMHG | OXYGEN SATURATION: 95 % | HEART RATE: 76 BPM | BODY MASS INDEX: 28.88 KG/M2

## 2019-04-16 PROCEDURE — 93000 ELECTROCARDIOGRAM COMPLETE: CPT

## 2019-04-16 PROCEDURE — 99215 OFFICE O/P EST HI 40 MIN: CPT

## 2019-04-16 NOTE — REASON FOR VISIT
[Consultation] : a consultation regarding [Atrial Fibrillation] : atrial fibrillation [FreeTextEntry1] : Referring: Dr. Aguirre

## 2019-04-16 NOTE — CARDIOLOGY SUMMARY
[No Ischemia] : no Ischemia [___] : [unfilled] [LVEF ___%] : LVEF [unfilled]% [Enlarged] : enlarged LA size [None] : no mitral regurgitation

## 2019-04-16 NOTE — HISTORY OF PRESENT ILLNESS
[FreeTextEntry1] : Mr. Garcia is a very pleasant 58-year-old / for [facility] with a PMHx\par of GERD and DVT not on AC who recently presented to the ER in typical atrial flutter w/RVR. He underwent MARINA/DCCV with successful restoration of NSR. However, since then he's developed atrial fibrillation. \par \par He presents to electrophysiology for consideration of ablative therapy for his atrial flutter and fibrillation. Pt states that he remembers for a few days after his cardioversion feeling well, and then suddenly began feeling more fatigued. This is also how he felt prior to his DCCV.\par \par He otherwise denies any CP, SHIRLEY, orthopnea/PND/LE edema, LH/dizziness/syncope, palpitations, or n/v/diaphoresis.

## 2019-04-16 NOTE — PHYSICAL EXAM
[General Appearance - Well Developed] : well developed [Normal Appearance] : normal appearance [Well Groomed] : well groomed [General Appearance - Well Nourished] : well nourished [No Deformities] : no deformities [General Appearance - In No Acute Distress] : no acute distress [Normal Conjunctiva] : the conjunctiva exhibited no abnormalities [Normal Oral Mucosa] : normal oral mucosa [No Oral Cyanosis] : no oral cyanosis [Normal Jugular Venous V Waves Present] : normal jugular venous V waves present [Heart Sounds] : normal S1 and S2 [Murmurs] : no murmurs present [Arterial Pulses Normal] : the arterial pulses were normal [Edema] : no peripheral edema present [FreeTextEntry1] : irr rhythm [Exaggerated Use Of Accessory Muscles For Inspiration] : no accessory muscle use [Respiration, Rhythm And Depth] : normal respiratory rhythm and effort [Auscultation Breath Sounds / Voice Sounds] : lungs were clear to auscultation bilaterally [Abdomen Soft] : soft [Abdomen Tenderness] : non-tender [Abnormal Walk] : normal gait [Cyanosis, Localized] : no localized cyanosis [Nail Clubbing] : no clubbing of the fingernails [Skin Color & Pigmentation] : normal skin color and pigmentation [Skin Turgor] : normal skin turgor [] : no rash [Oriented To Time, Place, And Person] : oriented to person, place, and time [Impaired Insight] : insight and judgment were intact [Affect] : the affect was normal [Mood] : the mood was normal [No Anxiety] : not feeling anxious

## 2019-04-16 NOTE — DISCUSSION/SUMMARY
[FreeTextEntry1] : 59 yo w/hx of GERD and DVT who developed atrial flutter s/p DCCV to NSR with subsequent development of atrial fibrillation (with controlled ventricular response). His atrial arrhythmias are symptomatic (as e/b fatigue). His CHADS-VASC = 0 and he has been on Eliquis and Multaq since his DCCV. \par \par The relative risks and benefits of alternative antiarrhythmic drug therapy vs. catheter ablation for both his atrial flutter and fibrillation has been reviewed in detail. After thoughtful consideration, the patient has opted to pursue RF ablation. \par \par The risks of the procedure were described in detail and include, but are not limited to: pain, bleeding, infection, vascular injury, cardiac perforation and tamponade with potential for requiring open heart surgery, complete heart block requiring permanent pacing, phrenic nerve injury/diaphragmatic paralysis, atrio-esophageal fistula, pulmonary vein stenosis, radiation-induced injury, death, heart attack, or stroke, of which the overall rates of occurrence range from 0.1-6%. The patient expressed understanding and was provided the opportunity to ask questions, of which all were answered to the patient's satisfaction.\par \par Recommendations:\par 1. Schedule RF ablation for both atrial flutter and fibrillation\par 2. Pt is s/p MARINA w/no appendage thrombus, and has been compliant with his NOAC therapy, and has a CHADS-VASC = 0, so therefore does not require a pre-procedure MARINA. Continue Eliquis and hold morning of the procedure\par 3. Will discontinue Multaq after procedure (small contribution currently to rate control [Multaq has weak AVN blocking properties])\par 4. RTC 1 month post-procedure

## 2019-04-17 NOTE — PHYSICAL EXAM
[Normal Breath Sounds] : Normal breath sounds [Abdomen Tenderness] : ~T ~M No abdominal tenderness [Normal Heart Sounds] : normal heart sounds [Oriented to Person] : oriented to person [Alert] : alert [No Rash or Lesion] : No rash or lesion [Oriented to Place] : oriented to place [Oriented to Time] : oriented to time [Calm] : calm [de-identified] : non icteric sclera   PERRLA  EOMS intact  and  nl [de-identified] : wdwn  male   in  NAD  pleasant manner [de-identified] : trachea  midline   no  nuchal rigidity [de-identified] : soft    no  distension  non  tender  abdomen    no  guarding  no  rebound  umbilical hernia  stable - reducible,  skin intact   no  palpable masses

## 2019-04-17 NOTE — ASSESSMENT
[FreeTextEntry1] : Return  to  Surgical office   s/p resolution  /tx of  A flutter \par Continue  diet  management  with  weight  loss \par Any  increase  in  pain /discomfort  to  abdomen/umbilical hernia   call  Surgical office  and  or  go  directly  to [facility]

## 2019-04-17 NOTE — HISTORY OF PRESENT ILLNESS
[de-identified] : Patient presents  to Surgical Office  for  recheck and  status of  umbilical hernia  As of  today's  date patient  has  no  pain   nl bm nl urination no  fevers  no  chills no n/v/d   Patient  feels  hernia  has not gotten any larger and  is  stable Patient  needs  to  address weight management  Patient is  under care  of  cardiology for  new dx of  A flutter  Patient may need  ablation -following up with cardiology this week No  SOB  no  chest  pain

## 2019-05-07 ENCOUNTER — MEDICATION RENEWAL (OUTPATIENT)
Age: 59
End: 2019-05-07

## 2019-05-08 ENCOUNTER — MEDICATION RENEWAL (OUTPATIENT)
Age: 59
End: 2019-05-08

## 2019-05-09 ENCOUNTER — MEDICATION RENEWAL (OUTPATIENT)
Age: 59
End: 2019-05-09

## 2019-05-09 ENCOUNTER — RX RENEWAL (OUTPATIENT)
Age: 59
End: 2019-05-09

## 2019-06-24 ENCOUNTER — OUTPATIENT (OUTPATIENT)
Dept: OUTPATIENT SERVICES | Facility: HOSPITAL | Age: 59
LOS: 1 days | End: 2019-06-24
Payer: COMMERCIAL

## 2019-06-24 VITALS
TEMPERATURE: 98 F | OXYGEN SATURATION: 96 % | SYSTOLIC BLOOD PRESSURE: 121 MMHG | WEIGHT: 223.11 LBS | HEART RATE: 80 BPM | DIASTOLIC BLOOD PRESSURE: 80 MMHG | HEIGHT: 74 IN | RESPIRATION RATE: 18 BRPM

## 2019-06-24 VITALS
TEMPERATURE: 98 F | DIASTOLIC BLOOD PRESSURE: 80 MMHG | RESPIRATION RATE: 18 BRPM | WEIGHT: 223.11 LBS | OXYGEN SATURATION: 96 % | HEIGHT: 74 IN | SYSTOLIC BLOOD PRESSURE: 121 MMHG | HEART RATE: 80 BPM

## 2019-06-24 DIAGNOSIS — Z01.810 ENCOUNTER FOR PREPROCEDURAL CARDIOVASCULAR EXAMINATION: ICD-10-CM

## 2019-06-24 DIAGNOSIS — Z98.890 OTHER SPECIFIED POSTPROCEDURAL STATES: Chronic | ICD-10-CM

## 2019-06-24 DIAGNOSIS — Z90.49 ACQUIRED ABSENCE OF OTHER SPECIFIED PARTS OF DIGESTIVE TRACT: Chronic | ICD-10-CM

## 2019-06-24 LAB
ANION GAP SERPL CALC-SCNC: 12 MMOL/L — SIGNIFICANT CHANGE UP (ref 5–17)
APTT BLD: 32.9 SEC — SIGNIFICANT CHANGE UP (ref 27.5–36.3)
BLD GP AB SCN SERPL QL: SIGNIFICANT CHANGE UP
BUN SERPL-MCNC: 20 MG/DL — SIGNIFICANT CHANGE UP (ref 8–20)
CALCIUM SERPL-MCNC: 9.4 MG/DL — SIGNIFICANT CHANGE UP (ref 8.6–10.2)
CHLORIDE SERPL-SCNC: 105 MMOL/L — SIGNIFICANT CHANGE UP (ref 98–107)
CO2 SERPL-SCNC: 23 MMOL/L — SIGNIFICANT CHANGE UP (ref 22–29)
CREAT SERPL-MCNC: 1.11 MG/DL — SIGNIFICANT CHANGE UP (ref 0.5–1.3)
GLUCOSE SERPL-MCNC: 90 MG/DL — SIGNIFICANT CHANGE UP (ref 70–115)
HCT VFR BLD CALC: 42.9 % — SIGNIFICANT CHANGE UP (ref 42–52)
HGB BLD-MCNC: 14.4 G/DL — SIGNIFICANT CHANGE UP (ref 14–18)
INR BLD: 1.19 RATIO — HIGH (ref 0.88–1.16)
MAGNESIUM SERPL-MCNC: 1.9 MG/DL — SIGNIFICANT CHANGE UP (ref 1.6–2.6)
MCHC RBC-ENTMCNC: 29.4 PG — SIGNIFICANT CHANGE UP (ref 27–31)
MCHC RBC-ENTMCNC: 33.6 G/DL — SIGNIFICANT CHANGE UP (ref 32–36)
MCV RBC AUTO: 87.6 FL — SIGNIFICANT CHANGE UP (ref 80–94)
PLATELET # BLD AUTO: 213 K/UL — SIGNIFICANT CHANGE UP (ref 150–400)
POTASSIUM SERPL-MCNC: 4.4 MMOL/L — SIGNIFICANT CHANGE UP (ref 3.5–5.3)
POTASSIUM SERPL-SCNC: 4.4 MMOL/L — SIGNIFICANT CHANGE UP (ref 3.5–5.3)
PROTHROM AB SERPL-ACNC: 13.7 SEC — HIGH (ref 10–12.9)
RBC # BLD: 4.9 M/UL — SIGNIFICANT CHANGE UP (ref 4.6–6.2)
RBC # FLD: 13.4 % — SIGNIFICANT CHANGE UP (ref 11–15.6)
SODIUM SERPL-SCNC: 140 MMOL/L — SIGNIFICANT CHANGE UP (ref 135–145)
TYPE + AB SCN PNL BLD: SIGNIFICANT CHANGE UP
WBC # BLD: 5 K/UL — SIGNIFICANT CHANGE UP (ref 4.8–10.8)
WBC # FLD AUTO: 5 K/UL — SIGNIFICANT CHANGE UP (ref 4.8–10.8)

## 2019-06-24 PROCEDURE — 93010 ELECTROCARDIOGRAM REPORT: CPT

## 2019-06-24 RX ORDER — TAMSULOSIN HYDROCHLORIDE 0.4 MG/1
1 CAPSULE ORAL
Qty: 0 | Refills: 0 | DISCHARGE

## 2019-06-24 RX ORDER — ENOXAPARIN SODIUM 100 MG/ML
100 INJECTION SUBCUTANEOUS
Qty: 2 | Refills: 0
Start: 2019-06-24

## 2019-06-24 NOTE — H&P PST ADULT - NSICDXFAMILYHX_GEN_ALL_CORE_FT
FAMILY HISTORY:  Father  Still living? No  Family history of lung cancer, Age at diagnosis: Age Unknown    Mother  Still living? Unknown  Family history of COPD (chronic obstructive pulmonary disease), Age at diagnosis: Age Unknown

## 2019-06-24 NOTE — H&P PST ADULT - NSICDXPASTMEDICALHX_GEN_ALL_CORE_FT
PAST MEDICAL HISTORY:  Atrial fibrillation and flutter     DVT (deep venous thrombosis) 8/2018 after plane ride    GERD (gastroesophageal reflux disease)     Renal stones

## 2019-06-24 NOTE — ASU PATIENT PROFILE, ADULT - PSH
History of appendectomy    History of cardioversion  3/8/2019 atrial flutter  History of lithotripsy  2/2019  History of umbilical hernia repair

## 2019-06-24 NOTE — H&P PST ADULT - NSICDXPASTSURGICALHX_GEN_ALL_CORE_FT
PAST SURGICAL HISTORY:  History of appendectomy     History of cardioversion 3/8/2019 atrial flutter    History of lithotripsy 2/2019    History of umbilical hernia repair

## 2019-06-24 NOTE — H&P PST ADULT - ASSESSMENT
58 year old male with history of GERD, DVT 8/2018 after 5 hour flight, atrial flutter s/p DCCV 3/8/19 with restoration of sinus rhythm.  He was initiated on Multaq at that time however, subsequently developed atrial fibrillation on follow up.  Pt denies palpitations, shortness of breath and dizziness; he does complain of occasional fatigue.  He presents today for PST in anticipation of atrial fibrillation and flutter ablation.    Plan:   Pre-procedure instructions provided (verbal & written) as follows:  Ablation 6/27/2019 (10Am arrival)   - Last dose eliquis 6/25/19 PM. Lovenox 100 mg x 2 doses 6/26/19 AM & PM.- lovenox escribed     - NPO after midnight prior except meds w/ sips of water.   Discharge teaching initiated.

## 2019-06-24 NOTE — H&P PST ADULT - NSALCOHOLFREQ_GEN_A_CORE_SD
Shelley Diehl RN   Plan last modified Shelley Diehl Dacia Ruiz MD [facility] [phone]            See the Encounter Report to view Anticoagulation Flowsheet and Dosing Calendar (Go to Encounters tab in chart review, and find the Anticoagulation Therapy Visit)        Shelley Diehl RN                monthly or less

## 2019-06-24 NOTE — H&P PST ADULT - HISTORY OF PRESENT ILLNESS
58 year old male with history of GERD, DVT, atrial flutter s/p DCCV 3/8/19 and initiated on multaq at that time.      Cardiology summary:  Stress test: 6/19/15, ETT, no ischemia, 13 MET exercise capacity  Echo: 3/8/19 MARINA, mild TR, normal LV function, enlarged LA size, no mitral regurgitation, LVEF 50-55% 58 year old male with history of GERD, DVT 8/2018 after plane ride, atrial flutter s/p DCCV 3/8/19 and initiated on multaq at that time.      Cardiology summary:  Stress test: 6/19/15, ETT, no ischemia, 13 MET exercise capacity  Echo: 3/8/19 MARINA, mild TR, normal LV function, enlarged LA size, no mitral regurgitation, LVEF 50-55% 58 year old male with history of GERD, DVT 8/2018 after 5 hour flight, atrial flutter s/p DCCV 3/8/19 with restoration of sinus rhythm.  He was initiated on Multaq at that time however, subsequently developed atrial fibrillation on follow up.  Pt denies palpitations, shortness of breath and dizziness; he does complain of occasional fatigue.  He presents today for PST in anticipation of atrial fibrillation and flutter ablation.          Cardiology summary:  Stress test: 6/19/15, ETT, no ischemia, 13 MET exercise capacity  Echo: 3/8/19 MARINA, mild TR, normal LV function, enlarged LA size, no mitral regurgitation, LVEF 50-55%

## 2019-06-24 NOTE — ASU PATIENT PROFILE, ADULT - PMH
Atrial fibrillation and flutter    DVT (deep venous thrombosis)  8/2018 after plane ride  GERD (gastroesophageal reflux disease)    Renal stones

## 2019-06-27 ENCOUNTER — TRANSCRIPTION ENCOUNTER (OUTPATIENT)
Age: 59
End: 2019-06-27

## 2019-06-27 ENCOUNTER — INPATIENT (INPATIENT)
Facility: HOSPITAL | Age: 59
LOS: 0 days | Discharge: HOME | DRG: 274 | End: 2019-06-28
Attending: INTERNAL MEDICINE | Admitting: INTERNAL MEDICINE
Payer: COMMERCIAL

## 2019-06-27 ENCOUNTER — MOBILE ON CALL (OUTPATIENT)
Age: 59
End: 2019-06-27

## 2019-06-27 VITALS
DIASTOLIC BLOOD PRESSURE: 75 MMHG | HEIGHT: 74 IN | HEART RATE: 109 BPM | TEMPERATURE: 98 F | OXYGEN SATURATION: 98 % | RESPIRATION RATE: 16 BRPM | SYSTOLIC BLOOD PRESSURE: 141 MMHG | WEIGHT: 222.67 LBS

## 2019-06-27 DIAGNOSIS — I48.91 UNSPECIFIED ATRIAL FIBRILLATION: ICD-10-CM

## 2019-06-27 DIAGNOSIS — Z98.890 OTHER SPECIFIED POSTPROCEDURAL STATES: Chronic | ICD-10-CM

## 2019-06-27 DIAGNOSIS — Z90.49 ACQUIRED ABSENCE OF OTHER SPECIFIED PARTS OF DIGESTIVE TRACT: Chronic | ICD-10-CM

## 2019-06-27 LAB — ABO RH CONFIRMATION: SIGNIFICANT CHANGE UP

## 2019-06-27 PROCEDURE — 86901 BLOOD TYPING SEROLOGIC RH(D): CPT

## 2019-06-27 PROCEDURE — 93656 COMPRE EP EVAL ABLTJ ATR FIB: CPT

## 2019-06-27 PROCEDURE — 85730 THROMBOPLASTIN TIME PARTIAL: CPT

## 2019-06-27 PROCEDURE — 85610 PROTHROMBIN TIME: CPT

## 2019-06-27 PROCEDURE — 36415 COLL VENOUS BLD VENIPUNCTURE: CPT

## 2019-06-27 PROCEDURE — 93657 TX L/R ATRIAL FIB ADDL: CPT

## 2019-06-27 PROCEDURE — 93613 INTRACARDIAC EPHYS 3D MAPG: CPT

## 2019-06-27 PROCEDURE — G0463: CPT

## 2019-06-27 PROCEDURE — 80048 BASIC METABOLIC PNL TOTAL CA: CPT

## 2019-06-27 PROCEDURE — 93655 ICAR CATH ABLTJ DSCRT ARRHYT: CPT

## 2019-06-27 PROCEDURE — 83735 ASSAY OF MAGNESIUM: CPT

## 2019-06-27 PROCEDURE — 86850 RBC ANTIBODY SCREEN: CPT

## 2019-06-27 PROCEDURE — 86923 COMPATIBILITY TEST ELECTRIC: CPT

## 2019-06-27 PROCEDURE — 93623 PRGRMD STIMJ&PACG IV RX NFS: CPT | Mod: 26

## 2019-06-27 PROCEDURE — 93005 ELECTROCARDIOGRAM TRACING: CPT

## 2019-06-27 PROCEDURE — 85027 COMPLETE CBC AUTOMATED: CPT

## 2019-06-27 PROCEDURE — 93662 INTRACARDIAC ECG (ICE): CPT | Mod: 26

## 2019-06-27 PROCEDURE — 93010 ELECTROCARDIOGRAM REPORT: CPT

## 2019-06-27 PROCEDURE — 86900 BLOOD TYPING SEROLOGIC ABO: CPT

## 2019-06-27 RX ORDER — ACETAMINOPHEN 500 MG
650 TABLET ORAL EVERY 6 HOURS
Refills: 0 | Status: DISCONTINUED | OUTPATIENT
Start: 2019-06-27 | End: 2019-06-28

## 2019-06-27 RX ORDER — BENZOCAINE AND MENTHOL 5; 1 G/100ML; G/100ML
1 LIQUID ORAL EVERY 4 HOURS
Refills: 0 | Status: DISCONTINUED | OUTPATIENT
Start: 2019-06-27 | End: 2019-06-28

## 2019-06-27 RX ORDER — SUCRALFATE 1 G
1 TABLET ORAL
Refills: 0 | Status: DISCONTINUED | OUTPATIENT
Start: 2019-06-27 | End: 2019-06-28

## 2019-06-27 RX ORDER — APIXABAN 2.5 MG/1
5 TABLET, FILM COATED ORAL
Refills: 0 | Status: DISCONTINUED | OUTPATIENT
Start: 2019-06-28 | End: 2019-06-28

## 2019-06-27 RX ORDER — ALPRAZOLAM 0.25 MG
0.25 TABLET ORAL EVERY 6 HOURS
Refills: 0 | Status: DISCONTINUED | OUTPATIENT
Start: 2019-06-27 | End: 2019-06-28

## 2019-06-27 RX ORDER — DRONEDARONE 400 MG/1
400 TABLET, FILM COATED ORAL
Refills: 0 | Status: DISCONTINUED | OUTPATIENT
Start: 2019-06-27 | End: 2019-06-27

## 2019-06-27 RX ORDER — PANTOPRAZOLE SODIUM 20 MG/1
40 TABLET, DELAYED RELEASE ORAL
Refills: 0 | Status: DISCONTINUED | OUTPATIENT
Start: 2019-06-27 | End: 2019-06-28

## 2019-06-27 RX ORDER — FUROSEMIDE 40 MG
40 TABLET ORAL ONCE
Refills: 0 | Status: COMPLETED | OUTPATIENT
Start: 2019-06-27 | End: 2019-06-27

## 2019-06-27 RX ORDER — OXYCODONE AND ACETAMINOPHEN 5; 325 MG/1; MG/1
1 TABLET ORAL EVERY 4 HOURS
Refills: 0 | Status: DISCONTINUED | OUTPATIENT
Start: 2019-06-27 | End: 2019-06-28

## 2019-06-27 RX ADMIN — Medication 40 MILLIGRAM(S): at 23:53

## 2019-06-27 RX ADMIN — APIXABAN 5 MILLIGRAM(S): 2.5 TABLET, FILM COATED ORAL at 23:53

## 2019-06-27 NOTE — DISCHARGE NOTE PROVIDER - NSDCFUADDINST_GEN_ALL_CORE_FT
- Bruising at the groin, sometimes extending down the leg, and/or a small lump under the skin at the groin access site is normal and will resolve within 2 – 3 weeks.   - Occasional skipped beats or palpitations that last for a few beats are common and generally resolve within 1-2 months.   - You make walk and take stairs at a regular pace.   - Do not perform any exercise more strenuous than walking for 1 week.   - Do not strain or lift heavy objects for 1 week.  - You may shower the day after the procedure.  - Do not soak in water (such as tub baths, hot tubs, swimming, etc.) for 1 week.   - You may resume all other activities the day after the procedure.  Call your doctor if:   - you notice bleeding, redness, drainage, swelling, increased tenderness or a hot sensation around the catheter insertion site.   - your temperature is greater than 100 degrees F for more than 24 hours.  - your rapid heart rhythm returns.  - you have any questions or concerns regarding the procedure.  If significant bleeding and/or a large lump (the size of a golf ball or bigger) occurs:  - Lie flat and apply continuous direct pressure just above the puncture site for at least 10 minutes  - If the issue resolves, notify your physician immediately.    - If the bleeding cannot be controlled, please seek immediate medical attention.  If you experience increased difficulty breathing or chest pain, or if you faint or have dizzy spells, please seek immediate medical attention.

## 2019-06-27 NOTE — DISCHARGE NOTE PROVIDER - NSDCCPCAREPLAN_GEN_ALL_CORE_FT
PRINCIPAL DISCHARGE DIAGNOSIS  Diagnosis: Paroxysmal atrial fibrillation  Assessment and Plan of Treatment:

## 2019-06-27 NOTE — DISCHARGE NOTE PROVIDER - HOSPITAL COURSE
58 year old male with history of GERD, DVT 8/2018 after 5 hour flight, atrial flutter s/p DCCV 3/8/19 with restoration of sinus rhythm. He is now s/p successful ablation for typical right atrial flutter; ablation along the cavotricuspid isthmus was performed and bidirectional block across the CTI was confirmed; successful pulmonary vein isolation of all four veins.

## 2019-06-27 NOTE — PROGRESS NOTE ADULT - SUBJECTIVE AND OBJECTIVE BOX
Cardiology NP post procedure note:     -s/p ablation for typical right atrial flutter; ablation along the cavotricuspid isthmus was performed and bidirectional block across the CTI was confirmed; successful pulmonary vein isolation of all four veins.      EKG: NSR 82 bpm  TELE: NSR 80s    MEDICATIONS  (STANDING):  pantoprazole    Tablet 40 milliGRAM(s) Oral before breakfast  sucralfate suspension 1 Gram(s) Oral two times a day    MEDICATIONS  (PRN):  acetaminophen   Tablet .. 650 milliGRAM(s) Oral every 6 hours PRN Mild Pain (1 - 3)  ALPRAZolam 0.25 milliGRAM(s) Oral every 6 hours PRN Anxiety  aluminum hydroxide/magnesium hydroxide/simethicone Suspension 30 milliLiter(s) Oral every 4 hours PRN Dyspepsia  benzocaine 15 mG/menthol 3.6 mG Lozenge 1 Lozenge Oral every 4 hours PRN Sore Throat  oxyCODONE    5 mG/acetaminophen 325 mG 1 Tablet(s) Oral every 4 hours PRN Moderate Pain (4 - 6)      Allergies:  No Known Allergies      PAST MEDICAL & SURGICAL HISTORY:  Atrial fibrillation and flutter  Renal stones  DVT (deep venous thrombosis): 8/2018 after plane ride  GERD (gastroesophageal reflux disease)  History of cardioversion: 3/8/2019 atrial flutter  History of umbilical hernia repair  History of appendectomy  History of lithotripsy: 2/2019      Vital Signs Last 24 Hrs  T(C): 36.7 (27 Jun 2019 11:33), Max: 36.7 (27 Jun 2019 11:33)  T(F): 98 (27 Jun 2019 11:33), Max: 98 (27 Jun 2019 11:33)  HR: 109 (27 Jun 2019 11:33) (109 - 109)  BP: 141/75 (27 Jun 2019 11:33) (141/75 - 141/75)  BP(mean): --  RR: 16 (27 Jun 2019 11:33) (16 - 16)  SpO2: 98% (27 Jun 2019 11:33) (98% - 98%)    Physical Exam:  Constitutional: NAD, sleepy but easily arousable  Cardiovascular: +S1S2 RRR  Pulmonary: CTA b/l, unlabored  GI: soft NTND +BS  Extremities: no pedal edema, +distal pulses b/l  Neuro: non focal, CARMICHAEL x4  Procedure site: right groin site benign without hematoma/bleeding; + right PP; 4x4 dressing CDI    A/P: 58 year old male with history of GERD, DVT 8/2018 after 5 hour flight, atrial flutter s/p DCCV 3/8/19 with restoration of sinus rhythm.  He was initiated on Multaq at that time however, subsequently developed atrial fibrillation on follow up.  Pt denies palpitations, shortness of breath and dizziness; he does complain of occasional fatigue.  He is now s/p successful ablation for typical right atrial flutter; ablation along the cavotricuspid isthmus was performed and bidirectional block across the CTI was confirmed; successful pulmonary vein isolation of all four veins.  -Admit for overnight observation on telemetry  -Stop Multaq as per Dr. Aaron  -Restart Eliquis 4 hours after sheath pull  -Protonix 40mg PO daily and Carafate x 1 month  -Follow up in office with Dr. Aaron within 4 weeks  -Activity instructions discussed with patient and family at bedside  -Discussed with Dr. Aaron Cardiology NP post procedure note:     -s/p ablation for typical right atrial flutter; ablation along the cavotricuspid isthmus was performed and bidirectional block across the CTI was confirmed; successful pulmonary vein isolation of all four veins.      EKG: NSR 82 bpm  TELE: NSR 80s    MEDICATIONS  (STANDING):  pantoprazole    Tablet 40 milliGRAM(s) Oral before breakfast  sucralfate suspension 1 Gram(s) Oral two times a day    MEDICATIONS  (PRN):  acetaminophen   Tablet .. 650 milliGRAM(s) Oral every 6 hours PRN Mild Pain (1 - 3)  ALPRAZolam 0.25 milliGRAM(s) Oral every 6 hours PRN Anxiety  aluminum hydroxide/magnesium hydroxide/simethicone Suspension 30 milliLiter(s) Oral every 4 hours PRN Dyspepsia  benzocaine 15 mG/menthol 3.6 mG Lozenge 1 Lozenge Oral every 4 hours PRN Sore Throat  oxyCODONE    5 mG/acetaminophen 325 mG 1 Tablet(s) Oral every 4 hours PRN Moderate Pain (4 - 6)      Allergies:  No Known Allergies      PAST MEDICAL & SURGICAL HISTORY:  Atrial fibrillation and flutter  Renal stones  DVT (deep venous thrombosis): 8/2018 after plane ride  GERD (gastroesophageal reflux disease)  History of cardioversion: 3/8/2019 atrial flutter  History of umbilical hernia repair  History of appendectomy  History of lithotripsy: 2/2019      Vital Signs Last 24 Hrs  T(C): 36.7 (27 Jun 2019 11:33), Max: 36.7 (27 Jun 2019 11:33)  T(F): 98 (27 Jun 2019 11:33), Max: 98 (27 Jun 2019 11:33)  HR: 109 (27 Jun 2019 11:33) (109 - 109)  BP: 141/75 (27 Jun 2019 11:33) (141/75 - 141/75)  BP(mean): --  RR: 16 (27 Jun 2019 11:33) (16 - 16)  SpO2: 98% (27 Jun 2019 11:33) (98% - 98%)    Physical Exam:  Constitutional: NAD, sleepy but easily arousable  Cardiovascular: +S1S2 RRR  Pulmonary: CTA b/l, unlabored  GI: soft NTND +BS  Extremities: no pedal edema, +distal pulses b/l  Neuro: non focal, CARMICHAEL x4  Procedure site: right groin site benign without hematoma/bleeding; + right PP; 4x4 dressing CDI    A/P: 58 year old male with history of GERD, DVT 8/2018 after 5 hour flight, atrial flutter s/p DCCV 3/8/19 with restoration of sinus rhythm.  He was initiated on Multaq at that time however, subsequently developed atrial fibrillation on follow up.  Pt denies palpitations, shortness of breath and dizziness; he does complain of occasional fatigue.  He is now s/p successful ablation for typical right atrial flutter; ablation along the cavotricuspid isthmus was performed and bidirectional block across the CTI was confirmed; successful pulmonary vein isolation of all four veins.  -Admit for overnight observation on telemetry  -Stop Multaq as per Dr. Aaron  -Restart Eliquis 4 hours after sheath pull  -Protonix 40mg PO daily and Carafate x 1 month  - Patient given ~3 liters of fluid intraop--will give Lasix 40mg IV x 1 once fully awake post procedure  -Follow up in office with Dr. Aaron within 4 weeks  -Activity instructions discussed with patient and family at bedside  -Discussed with Dr. Aaron

## 2019-06-27 NOTE — DISCHARGE NOTE PROVIDER - CARE PROVIDER_API CALL
Dangelo Aaron)  Cardiovascular Disease; Internal Medicine  [address] [place] [address]  Phone: [phone]  Fax: [phone]  Follow Up Time: 1 month

## 2019-06-28 ENCOUNTER — TRANSCRIPTION ENCOUNTER (OUTPATIENT)
Age: 59
End: 2019-06-28

## 2019-06-28 VITALS — DIASTOLIC BLOOD PRESSURE: 70 MMHG | HEART RATE: 69 BPM | SYSTOLIC BLOOD PRESSURE: 123 MMHG

## 2019-06-28 PROBLEM — I48.92 UNSPECIFIED ATRIAL FLUTTER: Chronic | Status: ACTIVE | Noted: 2019-06-24

## 2019-06-28 PROBLEM — N20.0 CALCULUS OF KIDNEY: Chronic | Status: ACTIVE | Noted: 2019-06-24

## 2019-06-28 PROBLEM — I82.409 ACUTE EMBOLISM AND THROMBOSIS OF UNSPECIFIED DEEP VEINS OF UNSPECIFIED LOWER EXTREMITY: Chronic | Status: ACTIVE | Noted: 2018-09-22

## 2019-06-28 LAB
ANION GAP SERPL CALC-SCNC: 12 MMOL/L — SIGNIFICANT CHANGE UP (ref 5–17)
BUN SERPL-MCNC: 17 MG/DL — SIGNIFICANT CHANGE UP (ref 8–20)
CALCIUM SERPL-MCNC: 8 MG/DL — LOW (ref 8.6–10.2)
CHLORIDE SERPL-SCNC: 108 MMOL/L — HIGH (ref 98–107)
CO2 SERPL-SCNC: 21 MMOL/L — LOW (ref 22–29)
CREAT SERPL-MCNC: 0.98 MG/DL — SIGNIFICANT CHANGE UP (ref 0.5–1.3)
GLUCOSE SERPL-MCNC: 136 MG/DL — HIGH (ref 70–115)
HCT VFR BLD CALC: 41 % — SIGNIFICANT CHANGE UP (ref 39–50)
HGB BLD-MCNC: 13.4 G/DL — SIGNIFICANT CHANGE UP (ref 13–17)
MAGNESIUM SERPL-MCNC: 1.9 MG/DL — SIGNIFICANT CHANGE UP (ref 1.6–2.6)
MCHC RBC-ENTMCNC: 29.2 PG — SIGNIFICANT CHANGE UP (ref 27–34)
MCHC RBC-ENTMCNC: 32.7 GM/DL — SIGNIFICANT CHANGE UP (ref 32–36)
MCV RBC AUTO: 89.3 FL — SIGNIFICANT CHANGE UP (ref 80–100)
PLATELET # BLD AUTO: 206 K/UL — SIGNIFICANT CHANGE UP (ref 150–400)
POTASSIUM SERPL-MCNC: 4 MMOL/L — SIGNIFICANT CHANGE UP (ref 3.5–5.3)
POTASSIUM SERPL-SCNC: 4 MMOL/L — SIGNIFICANT CHANGE UP (ref 3.5–5.3)
RBC # BLD: 4.59 M/UL — SIGNIFICANT CHANGE UP (ref 4.2–5.8)
RBC # FLD: 13 % — SIGNIFICANT CHANGE UP (ref 10.3–14.5)
SODIUM SERPL-SCNC: 141 MMOL/L — SIGNIFICANT CHANGE UP (ref 135–145)
WBC # BLD: 7.76 K/UL — SIGNIFICANT CHANGE UP (ref 3.8–10.5)
WBC # FLD AUTO: 7.76 K/UL — SIGNIFICANT CHANGE UP (ref 3.8–10.5)

## 2019-06-28 PROCEDURE — 85027 COMPLETE CBC AUTOMATED: CPT

## 2019-06-28 PROCEDURE — 83735 ASSAY OF MAGNESIUM: CPT

## 2019-06-28 PROCEDURE — 93662 INTRACARDIAC ECG (ICE): CPT

## 2019-06-28 PROCEDURE — C1732: CPT

## 2019-06-28 PROCEDURE — 93655 ICAR CATH ABLTJ DSCRT ARRHYT: CPT

## 2019-06-28 PROCEDURE — 93010 ELECTROCARDIOGRAM REPORT: CPT

## 2019-06-28 PROCEDURE — C1766: CPT

## 2019-06-28 PROCEDURE — C1731: CPT

## 2019-06-28 PROCEDURE — 93656 COMPRE EP EVAL ABLTJ ATR FIB: CPT

## 2019-06-28 PROCEDURE — 93623 PRGRMD STIMJ&PACG IV RX NFS: CPT

## 2019-06-28 PROCEDURE — 93613 INTRACARDIAC EPHYS 3D MAPG: CPT

## 2019-06-28 PROCEDURE — 36415 COLL VENOUS BLD VENIPUNCTURE: CPT

## 2019-06-28 PROCEDURE — 80048 BASIC METABOLIC PNL TOTAL CA: CPT

## 2019-06-28 PROCEDURE — C1894: CPT

## 2019-06-28 PROCEDURE — C1889: CPT

## 2019-06-28 PROCEDURE — 93005 ELECTROCARDIOGRAM TRACING: CPT

## 2019-06-28 PROCEDURE — 93657 TX L/R ATRIAL FIB ADDL: CPT

## 2019-06-28 RX ORDER — SUCRALFATE 1 G
10 TABLET ORAL
Qty: 600 | Refills: 0
Start: 2019-06-28 | End: 2019-07-27

## 2019-06-28 RX ORDER — ESOMEPRAZOLE MAGNESIUM 40 MG/1
1 CAPSULE, DELAYED RELEASE ORAL
Qty: 58 | Refills: 0
Start: 2019-06-28 | End: 2019-07-11

## 2019-06-28 RX ADMIN — BENZOCAINE AND MENTHOL 1 LOZENGE: 5; 1 LIQUID ORAL at 06:18

## 2019-06-28 RX ADMIN — Medication 1 GRAM(S): at 06:11

## 2019-06-28 RX ADMIN — PANTOPRAZOLE SODIUM 40 MILLIGRAM(S): 20 TABLET, DELAYED RELEASE ORAL at 06:11

## 2019-06-28 NOTE — DISCHARGE NOTE NURSING/CASE MANAGEMENT/SOCIAL WORK - NSDCDPATPORTLINK_GEN_ALL_CORE
You can access the [facility] Patient Portal, offered by [facility], by registering with the following website: [email or web address]

## 2019-06-28 NOTE — PROGRESS NOTE ADULT - SUBJECTIVE AND OBJECTIVE BOX
Patient seen today in bed. Dressing removed from right groin without complication.     EKG: NSR at 63bpm; QRSD 116ms  TELE: SR. No events.     MEDICATIONS  (STANDING):  apixaban 5 milliGRAM(s) Oral <User Schedule>  pantoprazole    Tablet 40 milliGRAM(s) Oral before breakfast  sucralfate suspension 1 Gram(s) Oral two times a day    MEDICATIONS  (PRN):  acetaminophen   Tablet .. 650 milliGRAM(s) Oral every 6 hours PRN Mild Pain (1 - 3)  ALPRAZolam 0.25 milliGRAM(s) Oral every 6 hours PRN Anxiety  aluminum hydroxide/magnesium hydroxide/simethicone Suspension 30 milliLiter(s) Oral every 4 hours PRN Dyspepsia  benzocaine 15 mG/menthol 3.6 mG Lozenge 1 Lozenge Oral every 4 hours PRN Sore Throat  oxyCODONE    5 mG/acetaminophen 325 mG 1 Tablet(s) Oral every 4 hours PRN Moderate Pain (4 - 6)    Allergies  No Known Allergies    PAST MEDICAL & SURGICAL HISTORY:  Atrial fibrillation and flutter  Renal stones  DVT (deep venous thrombosis): 8/2018 after plane ride  GERD (gastroesophageal reflux disease)  History of cardioversion: 3/8/2019 atrial flutter  History of umbilical hernia repair  History of appendectomy  History of lithotripsy: 2/2019    Vital Signs Last 24 Hrs  T(C): 36.7 (28 Jun 2019 07:37), Max: 36.7 (27 Jun 2019 11:33)  T(F): 98.1 (28 Jun 2019 07:37), Max: 98.1 (28 Jun 2019 07:37)  HR: 66 (28 Jun 2019 07:37) (66 - 109)  BP: 120/75 (28 Jun 2019 06:00) (101/58 - 141/75)  RR: 15 (28 Jun 2019 07:37) (8 - 16)  SpO2: 98% (28 Jun 2019 07:37) (91% - 98%)    Physical Exam:  Constitutional: NAD, AAOx3  Cardiovascular: +S1S2 RRR  Pulmonary: CTA b/l, unlabored  GI: soft NTND +BS  Extremities: no pedal edema,   Right Groin: No hematoma  Neuro: non focal, CARMICHAEL x4    LABS:                        13.4   7.76  )-----------( 206                   41.0     141  |  108<H>  |  17.0  ----------------------------<  136<H>  4.0   |  21.0<L>  |  0.98  Ca    8.0<L>        Mg     1.9     06-28    A/P  58 year old male with history of GERD, DVT 8/2018 after 5 hour flight, atrial flutter s/p DCCV 3/8/19 with restoration of sinus rhythm. He is now s/p successful ablation for typical right atrial flutter; ablation along the cavotricuspid isthmus was performed and bidirectional block across the CTI was confirmed; successful pulmonary vein isolation of all four veins.    - Discharge home today

## 2019-06-29 ENCOUNTER — INPATIENT (INPATIENT)
Facility: HOSPITAL | Age: 59
LOS: 4 days | Discharge: HOME | DRG: 308 | End: 2019-07-04
Attending: INTERNAL MEDICINE | Admitting: INTERNAL MEDICINE
Payer: COMMERCIAL

## 2019-06-29 VITALS
TEMPERATURE: 98 F | SYSTOLIC BLOOD PRESSURE: 124 MMHG | RESPIRATION RATE: 20 BRPM | DIASTOLIC BLOOD PRESSURE: 82 MMHG | OXYGEN SATURATION: 97 % | HEIGHT: 74 IN | HEART RATE: 98 BPM | WEIGHT: 225.09 LBS

## 2019-06-29 DIAGNOSIS — Z98.890 OTHER SPECIFIED POSTPROCEDURAL STATES: Chronic | ICD-10-CM

## 2019-06-29 DIAGNOSIS — I48.91 UNSPECIFIED ATRIAL FIBRILLATION: ICD-10-CM

## 2019-06-29 DIAGNOSIS — Z90.49 ACQUIRED ABSENCE OF OTHER SPECIFIED PARTS OF DIGESTIVE TRACT: Chronic | ICD-10-CM

## 2019-06-29 LAB
ALBUMIN SERPL ELPH-MCNC: 4 G/DL — SIGNIFICANT CHANGE UP (ref 3.3–5.2)
ALP SERPL-CCNC: 58 U/L — SIGNIFICANT CHANGE UP (ref 40–120)
ALT FLD-CCNC: 22 U/L — SIGNIFICANT CHANGE UP
ANION GAP SERPL CALC-SCNC: 10 MMOL/L — SIGNIFICANT CHANGE UP (ref 5–17)
APTT BLD: 28.5 SEC — SIGNIFICANT CHANGE UP (ref 27.5–36.3)
AST SERPL-CCNC: 29 U/L — SIGNIFICANT CHANGE UP
BASOPHILS # BLD AUTO: 0.03 K/UL — SIGNIFICANT CHANGE UP (ref 0–0.2)
BASOPHILS NFR BLD AUTO: 0.3 % — SIGNIFICANT CHANGE UP (ref 0–2)
BILIRUB SERPL-MCNC: 1.3 MG/DL — SIGNIFICANT CHANGE UP (ref 0.4–2)
BUN SERPL-MCNC: 14 MG/DL — SIGNIFICANT CHANGE UP (ref 8–20)
CALCIUM SERPL-MCNC: 8.9 MG/DL — SIGNIFICANT CHANGE UP (ref 8.6–10.2)
CHLORIDE SERPL-SCNC: 104 MMOL/L — SIGNIFICANT CHANGE UP (ref 98–107)
CK MB CFR SERPL CALC: 4.2 NG/ML — SIGNIFICANT CHANGE UP (ref 0–6.7)
CK SERPL-CCNC: 342 U/L — HIGH (ref 30–200)
CO2 SERPL-SCNC: 25 MMOL/L — SIGNIFICANT CHANGE UP (ref 22–29)
CREAT SERPL-MCNC: 0.82 MG/DL — SIGNIFICANT CHANGE UP (ref 0.5–1.3)
EOSINOPHIL # BLD AUTO: 0.01 K/UL — SIGNIFICANT CHANGE UP (ref 0–0.5)
EOSINOPHIL NFR BLD AUTO: 0.1 % — SIGNIFICANT CHANGE UP (ref 0–6)
GLUCOSE SERPL-MCNC: 113 MG/DL — SIGNIFICANT CHANGE UP (ref 70–115)
HCT VFR BLD CALC: 39.7 % — SIGNIFICANT CHANGE UP (ref 39–50)
HGB BLD-MCNC: 12.9 G/DL — LOW (ref 13–17)
IMM GRANULOCYTES NFR BLD AUTO: 0.5 % — SIGNIFICANT CHANGE UP (ref 0–1.5)
INR BLD: 1.21 RATIO — HIGH (ref 0.88–1.16)
LYMPHOCYTES # BLD AUTO: 1.09 K/UL — SIGNIFICANT CHANGE UP (ref 1–3.3)
LYMPHOCYTES # BLD AUTO: 11.2 % — LOW (ref 13–44)
MCHC RBC-ENTMCNC: 29.1 PG — SIGNIFICANT CHANGE UP (ref 27–34)
MCHC RBC-ENTMCNC: 32.5 GM/DL — SIGNIFICANT CHANGE UP (ref 32–36)
MCV RBC AUTO: 89.4 FL — SIGNIFICANT CHANGE UP (ref 80–100)
MONOCYTES # BLD AUTO: 1.2 K/UL — HIGH (ref 0–0.9)
MONOCYTES NFR BLD AUTO: 12.3 % — SIGNIFICANT CHANGE UP (ref 2–14)
NEUTROPHILS # BLD AUTO: 7.34 K/UL — SIGNIFICANT CHANGE UP (ref 1.8–7.4)
NEUTROPHILS NFR BLD AUTO: 75.6 % — SIGNIFICANT CHANGE UP (ref 43–77)
NT-PROBNP SERPL-SCNC: 766 PG/ML — HIGH (ref 0–300)
PLATELET # BLD AUTO: 174 K/UL — SIGNIFICANT CHANGE UP (ref 150–400)
POTASSIUM SERPL-MCNC: 3.8 MMOL/L — SIGNIFICANT CHANGE UP (ref 3.5–5.3)
POTASSIUM SERPL-SCNC: 3.8 MMOL/L — SIGNIFICANT CHANGE UP (ref 3.5–5.3)
PROT SERPL-MCNC: 6.8 G/DL — SIGNIFICANT CHANGE UP (ref 6.6–8.7)
PROTHROM AB SERPL-ACNC: 14 SEC — HIGH (ref 10–12.9)
RBC # BLD: 4.44 M/UL — SIGNIFICANT CHANGE UP (ref 4.2–5.8)
RBC # FLD: 13 % — SIGNIFICANT CHANGE UP (ref 10.3–14.5)
SODIUM SERPL-SCNC: 139 MMOL/L — SIGNIFICANT CHANGE UP (ref 135–145)
TROPONIN T SERPL-MCNC: 0.28 NG/ML — HIGH (ref 0–0.06)
TROPONIN T SERPL-MCNC: 0.54 NG/ML — HIGH (ref 0–0.06)
WBC # BLD: 9.72 K/UL — SIGNIFICANT CHANGE UP (ref 3.8–10.5)
WBC # FLD AUTO: 9.72 K/UL — SIGNIFICANT CHANGE UP (ref 3.8–10.5)

## 2019-06-29 PROCEDURE — 71275 CT ANGIOGRAPHY CHEST: CPT | Mod: 26

## 2019-06-29 PROCEDURE — 71045 X-RAY EXAM CHEST 1 VIEW: CPT | Mod: 26

## 2019-06-29 PROCEDURE — 99285 EMERGENCY DEPT VISIT HI MDM: CPT

## 2019-06-29 PROCEDURE — 99222 1ST HOSP IP/OBS MODERATE 55: CPT

## 2019-06-29 PROCEDURE — 99223 1ST HOSP IP/OBS HIGH 75: CPT

## 2019-06-29 RX ORDER — MORPHINE SULFATE 50 MG/1
6 CAPSULE, EXTENDED RELEASE ORAL ONCE
Refills: 0 | Status: DISCONTINUED | OUTPATIENT
Start: 2019-06-29 | End: 2019-06-29

## 2019-06-29 RX ORDER — SODIUM CHLORIDE 9 MG/ML
1000 INJECTION INTRAMUSCULAR; INTRAVENOUS; SUBCUTANEOUS ONCE
Refills: 0 | Status: COMPLETED | OUTPATIENT
Start: 2019-06-29 | End: 2019-06-29

## 2019-06-29 RX ORDER — CEFTRIAXONE 500 MG/1
1000 INJECTION, POWDER, FOR SOLUTION INTRAMUSCULAR; INTRAVENOUS ONCE
Refills: 0 | Status: COMPLETED | OUTPATIENT
Start: 2019-06-29 | End: 2019-06-29

## 2019-06-29 RX ORDER — AZITHROMYCIN 500 MG/1
500 TABLET, FILM COATED ORAL ONCE
Refills: 0 | Status: COMPLETED | OUTPATIENT
Start: 2019-06-29 | End: 2019-06-29

## 2019-06-29 RX ORDER — APIXABAN 2.5 MG/1
5 TABLET, FILM COATED ORAL EVERY 12 HOURS
Refills: 0 | Status: DISCONTINUED | OUTPATIENT
Start: 2019-06-29 | End: 2019-07-04

## 2019-06-29 RX ORDER — PANTOPRAZOLE SODIUM 20 MG/1
40 TABLET, DELAYED RELEASE ORAL
Refills: 0 | Status: DISCONTINUED | OUTPATIENT
Start: 2019-06-29 | End: 2019-07-04

## 2019-06-29 RX ADMIN — AZITHROMYCIN 500 MILLIGRAM(S): 500 TABLET, FILM COATED ORAL at 22:17

## 2019-06-29 RX ADMIN — SODIUM CHLORIDE 1000 MILLILITER(S): 9 INJECTION INTRAMUSCULAR; INTRAVENOUS; SUBCUTANEOUS at 18:06

## 2019-06-29 RX ADMIN — CEFTRIAXONE 100 MILLIGRAM(S): 500 INJECTION, POWDER, FOR SOLUTION INTRAMUSCULAR; INTRAVENOUS at 17:06

## 2019-06-29 RX ADMIN — SODIUM CHLORIDE 4000 MILLILITER(S): 9 INJECTION INTRAMUSCULAR; INTRAVENOUS; SUBCUTANEOUS at 17:06

## 2019-06-29 RX ADMIN — MORPHINE SULFATE 6 MILLIGRAM(S): 50 CAPSULE, EXTENDED RELEASE ORAL at 13:00

## 2019-06-29 RX ADMIN — MORPHINE SULFATE 6 MILLIGRAM(S): 50 CAPSULE, EXTENDED RELEASE ORAL at 13:40

## 2019-06-29 RX ADMIN — CEFTRIAXONE 1000 MILLIGRAM(S): 500 INJECTION, POWDER, FOR SOLUTION INTRAMUSCULAR; INTRAVENOUS at 17:36

## 2019-06-29 RX ADMIN — AZITHROMYCIN 255 MILLIGRAM(S): 500 TABLET, FILM COATED ORAL at 18:11

## 2019-06-29 NOTE — H&P ADULT - NSICDXFAMILYHX_GEN_ALL_CORE_FT
FAMILY HISTORY:  Family history of COPD (chronic obstructive pulmonary disease)  Family history of lung cancer

## 2019-06-29 NOTE — ED ADULT NURSE REASSESSMENT NOTE - NS ED NURSE REASSESS COMMENT FT1
Assumed care at this time, pt in no acute distress, No c/o pain, denies CP, SOB, v/s stable. Pt to be transferred to CDU for continued monitoring

## 2019-06-29 NOTE — ED PROVIDER NOTE - PROGRESS NOTE DETAILS
seen by Dr. Carter, recommend TTE and admission for posisble cardioversion tomorrow or monday -Matthew PIZANO spoke with Dr. Aaron was concern for atrial esophageal fistula due to description of pain. spoke with VRJENNY, could not 'rule out' perforation but no secondary signs on CT- no extravation no air. CT/MRI diagnostic imaging of choice. could proceed to MR if necessary. patient remains stable, no sepsis criteria except for HR. no dysphagia/hematemesis. feeling much better. will hold on further w/u at this time -Matthew DO

## 2019-06-29 NOTE — ED ADULT NURSE NOTE - PRIMARY CARE PROVIDER
CERTIFICATE OF WORK    March 22, 2018      Re: Mateo Arambula  [address]  [facility] [address]      This is to certify that Mateo Arambula has been seen on 3/22/2018 and can return to light work on 3/23/18    RESTRICTIONS: No lifting or torquing greater then 15 pounds. No recurring kneeling or crawling. Laying on the right side for two weeks. Brain will be reevaluated by the MD in two weeks.       REMARKS: any questions call the [facility] at [phone]         SIGNATURE:___________________________________________,   3/22/2018                               DO Veronica Gauthier [facility]     Chadd Shane DO  [address] [place] [address]  
John Cedillo

## 2019-06-29 NOTE — ED PROVIDER NOTE - NS ED ROS FT
ROS: +CP/SOB. no cough. no fever. no n/v/d/c. no abd pain. no rash. no bleeding. no urinary complaints. no weakness. no vision changes. no HA. +back pain. no extremity swelling/deformity. No change in mental status.

## 2019-06-29 NOTE — H&P ADULT - ASSESSMENT
57 y/o male with s/p cardiac ablation for a flutter 3 days ago, h/o DVT on Eliquis came to the ED complaining of chest pain radiating to the back x 1 day.    Afib with RVR   Admit to telemetry  ECG: afib with RVR   Cardiology consulted   Echo ordered   Lopressor 25mg q6h for rate control     DVT   Continue Eliquis 5mg bid     GERD  Continue PPI 40mg bid     Supportive   DVT prophylaxis: Eliquis   GI prophylaxis: PPI 40mg   Diet: DASH for now 57 y/o male with s/p cardiac ablation for a flutter 3 days ago, h/o DVT on Eliquis came to the ED complaining of chest pain radiating to the back x 1 day.    Afib with RVR   Admit to telemetry  ECG: afib with RVR   Cardiology consulted   Echo ordered   Lopressor 25mg q6h for rate control     PNA  CTA chest: bibasilar posterior sulcus symmetric airspace consolidation   Rocephin and Azithromycin once given in the ED   Will continue    DVT   Continue Eliquis 5mg bid     GERD  Continue PPI 40mg bid     Supportive   DVT prophylaxis: Eliquis   GI prophylaxis: PPI 40mg   Diet: DASH for now

## 2019-06-29 NOTE — ED PROVIDER NOTE - PHYSICAL EXAMINATION
Gen: uncomfortable sitting upright in stretcher, AOx3  Head: NCAT  HEENT: PERRL, EOMI, oral mucosa moist, normal conjunctiva, neck supple  Lung: faint bibasilar crackles, mild tachypnea, speaking in full sentences  CV: tachy irregular, no murmur, Normal perfusion +2 equal radial pulses  Abd: soft, NTND  MSK: No edema, no visible deformities  Neuro: No focal neurologic deficits  Skin: No rash   Psych: normal affect

## 2019-06-29 NOTE — ED PROVIDER NOTE - CLINICAL SUMMARY MEDICAL DECISION MAKING FREE TEXT BOX
patient with CP/back pain worse with lying flat, possible pericarditis, bedside sono trace pericardial fluid no tamponade, faint crackle on exam minimal B lines/trace pleural effusions. unlikely CHF or ACS. will check labs. expect mild elevation trop/BNP with procedure. will give morphine for now. CTA for PE placed by triage MD. will Follow up as patient did have recent surgery, is already on NOAC. now in A fib, HR in 110s.

## 2019-06-29 NOTE — CONSULT NOTE ADULT - SUBJECTIVE AND OBJECTIVE BOX
[place] [facility]                                                        Office: [address] [address]                                                       Telephone: [phone]. Fax:[phone]                                                              CARDIOLOGY CONSULTATION NOTE                                                                                             Consult requested by: Dr Shante Castro     PCP    Primary Cardiologist.    Reason for Consultation: Atrial fib    History obtained by: Patient and medical record     obtained: No    Chief complaint:    Patient is a 58y old  Male who presents with a chief complaint of     HPI: ED note- 57yo M s/p cardiac ablation for a flutter 3 days ago, h/o DVT on eliquis was on lovenox for 2 days, otherwise no missed doses. since D/C yesterday with back pain soreness also in chest. mild SOB. worse with laying down. no h/o ACS/CHF. no fever/chills. no cough. called Dr. Aaron who sent to ED.      58 year old male with history of GERD, DVT 8/2018 after 5 hour flight, atrial flutter s/p DCCV 3/8/19 with restoration of sinus rhythm. He is now s/p successful ablation for typical right atrial flutter; ablation along the cavotricuspid isthmus was performed and bidirectional block across the CTI was confirmed; successful pulmonary vein isolation of all four veins.  Stress test: 6/19/15, ETT, no ischemia, 13 MET exercise capacity  Echo: 3/8/19 MARINA, mild TR, normal LV function, enlarged LA size, no mitral regurgitation, LVEF 50-55%    ALLERGIES: Allergies    No Known Allergies    Intolerances          CURRENT MEDICATIONS:  MEDICATIONS  (STANDING):  azithromycin  IVPB 500 milliGRAM(s) IV Intermittent Once      HOME MEDICATIONS:  Home Medications:  Home Medications:    PAST MEDICAL HISTORY  Atrial fibrillation and flutter  Renal stones  DVT (deep venous thrombosis)  GERD (gastroesophageal reflux disease)      PAST SURGICAL HISTORY  History of cardioversion  History of umbilical hernia repair  History of appendectomy  History of lithotripsy  H/O lithotripsy  No significant past surgical history      FAMILY HISTORY:  Family history of lung cancer  Family history of COPD (chronic obstructive pulmonary disease)      SOCIAL HISTORY:       CIGARETTES:       ALCOHOL    DRUG ABUSE      REVIEW OF SYSTEMS:  CONSTITUTIONAL:  as per HPI  HEENT:  Eyes:  No diplopia or blurred vision. ENT:  No earache, sore throat or runny nose.  CARDIOVASCULAR:  No pressure, squeezing, strangling, tightness, heaviness or aching about the chest, neck, axilla or epigastrium.  RESPIRATORY:  No cough, shortness of breath, PND or orthopnea.  GASTROINTESTINAL:  No nausea, vomiting or diarrhea.  GENITOURINARY:  No dysuria, frequency or urgency. No Blood in urine  MUSCULOSKELETAL:  no joint aches, no muscle pain  SKIN:  No change in skin, hair or nails.  NEUROLOGIC:  No paresthesias, fasciculations, seizures or weakness.  PSYCHIATRIC:  No disorder of thought or mood.  ENDOCRINE:  No heat or cold intolerance, polyuria or polydipsia.  HEMATOLOGICAL:  No easy bruising or bleeding.           	        Vital Signs Last 24 Hrs  T(C): 36.6 (06-29-19 @ 11:29), Max: 36.6 (06-29-19 @ 11:29)  T(F): 97.9 (06-29-19 @ 11:29), Max: 97.9 (06-29-19 @ 11:29)  HR: 98 (06-29-19 @ 11:29) (98 - 98)  BP: 124/82 (06-29-19 @ 11:29) (124/82 - 124/82)  BP(mean): --  RR: 20 (06-29-19 @ 11:29) (20 - 20)  SpO2: 97% (06-29-19 @ 11:29) (97% - 97%)    PHYSICAL EXAM:  Constitutional: Comfortable . No acute distress.   HEENT: Atraumatic and normcephalic , neck is supple . no JVD. Unremarkable  CNS: Alert and awake, Grossly nonfocal.  Lymph Nodes: Cervical : Not palpable.  Respiratory: Bilateral clear breath sounds.  Cardiovascular: Normal S1S2. . No murmur/rubs or gallop.  Gastrointestinal: Soft non-tender and non distended . +Bowel sounds.   Extremities: No leg edema.   Psychiatric: Calm . no agitation.  Skin: No skin rash.    Intake and output:     LABS:                        12.9   9.72  )-----------( 174      ( 29 Jun 2019 12:38 )             39.7     06-29    139  |  104  |  14.0  ----------------------------<  113  3.8   |  25.0  |  0.82    Ca    8.9      29 Jun 2019 12:38  Mg     1.9     06-28    TPro  6.8  /  Alb  4.0  /  TBili  1.3  /  DBili  x   /  AST  29  /  ALT  22  /  AlkPhos  58  06-29    CARDIAC MARKERS ( 29 Jun 2019 12:38 )  x     / 0.54 ng/mL / 342 U/L / x     / 4.2 ng/mL    ;p-BNP=Serum Pro-Brain Natriuretic Peptide: 766 pg/mL (06-29 @ 12:50)    PT/INR - ( 29 Jun 2019 12:38 )   PT: 14.0 sec;   INR: 1.21 ratio         PTT - ( 29 Jun 2019 12:38 )  PTT:28.5 sec    LIVER FUNCTIONS - ( 29 Jun 2019 12:38 )  Alb: 4.0 g/dL / Pro: 6.8 g/dL / ALK PHOS: 58 U/L / ALT: 22 U/L / AST: 29 U/L / GGT: x           INTERPRETATION OF TELEMETRY: Reviewed by me.   ECG: Reviewed by me.     RADIOLOGY & ADDITIONAL STUDIES/ECHO/CARDIAC CATH:                   < from: MARINA Echo Doppler (03.08.19 @ 12:01) >    Summary:   1. Technically good study.   2. Normal global left ventricular systolic function.   3. Left ventricular ejection fraction, by visual estimation, is 50 to   55%.   4. The mitral in-flow pattern reveals no discernable A-wave, therefore   no comment on diastolic function can be made.   5. Color flow doppler and intravenous injection of agitated saline   demonstrates the presence of an intact intra atrial septum.   6. Left atrial enlargement.   7. Normal right ventricular size and function.   8. Mild tricuspid regurgitation.   9. No left atrial appendage thrombus.  10. There is no evidence of pericardial effusion.  11. MARINA probe was removed. Mr. Garcia was cardioverted by Dr. Aaron.    [id number] Benjamin Burgess MD, Electronically signed on 3/8/2019 at 5:05:17 PM              *** Final ***                  BENJAMIN BURGESS   This document has been electronically signed. Mar  8 2019 12:01PM        < end of copied text > [place] [facility]                                                        Office: [address] [address]                                                       Telephone: [phone]. Fax:[phone]                                                              CARDIOLOGY CONSULTATION NOTE                                                                                             Consult requested by: Dr Shante Castro     PCP    Primary Cardiologist.- Dr Aaron    Reason for Consultation: Atrial fib    History obtained by: Patient and medical record     obtained: No    Chief complaint:    Patient is a 58y old  Male who presents with a chief complaint of     HPI: ED note- 59yo M s/p cardiac ablation for a flutter 3 days ago, h/o DVT on eliquis was on lovenox for 2 days, otherwise no missed doses. since D/C yesterday with back pain soreness also in chest. mild SOB. worse with laying down. no h/o ACS/CHF. no fever/chills. no cough. called Dr. Aaron who sent to ED.    Above HPI noted and reviewed.    58 year old male with history of GERD, DVT 8/2018 after 5 hour flight, atrial flutter s/p DCCV 3/8/19 with restoration of sinus rhythm. He is now s/p successful ablation for typical right atrial flutter; ablation along the cavotricuspid isthmus was performed and bidirectional block across the CTI was confirmed; successful pulmonary vein isolation of all four veins.  Stress test: 6/19/15, ETT, no ischemia, 13 MET exercise capacity  Echo: 3/8/19 MARINA, mild TR, normal LV function, enlarged LA size, no mitral regurgitation, LVEF 50-55%    Patient had some discomfort in the upper back and chest, he thought it may be due to being on back for the procedure, then he called and Dr Aaron, who asked him to come to ER for Chest CT. In ER patient was found to have Atrial fibrillation with RVR. Patient has fever or chills. Patient has no other complaints    ALLERGIES: Allergies    No Known Allergies    Intolerances          CURRENT MEDICATIONS:  MEDICATIONS  (STANDING):  azithromycin  IVPB 500 milliGRAM(s) IV Intermittent Once      HOME MEDICATIONS:  Home Medications:  Home Medications:    PAST MEDICAL HISTORY  Atrial fibrillation and flutter  Renal stones  DVT (deep venous thrombosis)  GERD (gastroesophageal reflux disease)      PAST SURGICAL HISTORY  History of cardioversion  History of umbilical hernia repair  History of appendectomy  History of lithotripsy  H/O lithotripsy  No significant past surgical history      FAMILY HISTORY:  Family history of lung cancer  Family history of COPD (chronic obstructive pulmonary disease)      SOCIAL HISTORY:       CIGARETTES:       ALCOHOL    DRUG ABUSE      REVIEW OF SYSTEMS:  CONSTITUTIONAL:  as per HPI  HEENT:  Eyes:  No diplopia or blurred vision. ENT:  No earache, sore throat or runny nose.  CARDIOVASCULAR:  SEE HPI  RESPIRATORY:  No cough, shortness of breath, PND or orthopnea.  GASTROINTESTINAL:  No nausea, vomiting or diarrhea.  GENITOURINARY:  No dysuria, frequency or urgency. No Blood in urine  MUSCULOSKELETAL:  no joint aches, no muscle pain  SKIN:  No change in skin, hair or nails.  NEUROLOGIC:  No paresthesias, fasciculations, seizures or weakness.  PSYCHIATRIC:  No disorder of thought or mood.  ENDOCRINE:  No heat or cold intolerance, polyuria or polydipsia.  HEMATOLOGICAL:  No easy bruising or bleeding.           	        Vital Signs Last 24 Hrs  T(C): 36.6 (06-29-19 @ 11:29), Max: 36.6 (06-29-19 @ 11:29)  T(F): 97.9 (06-29-19 @ 11:29), Max: 97.9 (06-29-19 @ 11:29)  HR: 98 (06-29-19 @ 11:29) (98 - 98)  BP: 124/82 (06-29-19 @ 11:29) (124/82 - 124/82)  BP(mean): --  RR: 20 (06-29-19 @ 11:29) (20 - 20)  SpO2: 97% (06-29-19 @ 11:29) (97% - 97%)    PHYSICAL EXAM:  Constitutional: Comfortable . No acute distress.   HEENT: Atraumatic and normcephalic , neck is supple . no JVD. Unremarkable  CNS: Alert and awake, Grossly nonfocal.  Lymph Nodes: Cervical : Not palpable.  Respiratory: Bilateral clear breath sounds.  Cardiovascular: Normal S1S2. . No murmur/rubs or gallop.  Gastrointestinal: Soft non-tender and non distended . +Bowel sounds.   Extremities: No leg edema.   Psychiatric: Calm . no agitation.  Skin: No skin rash.    Intake and output:     LABS:                        12.9   9.72  )-----------( 174      ( 29 Jun 2019 12:38 )             39.7     06-29    139  |  104  |  14.0  ----------------------------<  113  3.8   |  25.0  |  0.82    Ca    8.9      29 Jun 2019 12:38  Mg     1.9     06-28    TPro  6.8  /  Alb  4.0  /  TBili  1.3  /  DBili  x   /  AST  29  /  ALT  22  /  AlkPhos  58  06-29    CARDIAC MARKERS ( 29 Jun 2019 12:38 )  x     / 0.54 ng/mL / 342 U/L / x     / 4.2 ng/mL    ;p-BNP=Serum Pro-Brain Natriuretic Peptide: 766 pg/mL (06-29 @ 12:50)    PT/INR - ( 29 Jun 2019 12:38 )   PT: 14.0 sec;   INR: 1.21 ratio         PTT - ( 29 Jun 2019 12:38 )  PTT:28.5 sec    LIVER FUNCTIONS - ( 29 Jun 2019 12:38 )  Alb: 4.0 g/dL / Pro: 6.8 g/dL / ALK PHOS: 58 U/L / ALT: 22 U/L / AST: 29 U/L / GGT: x           INTERPRETATION OF TELEMETRY: Reviewed by me.   ECG: Reviewed by me.  Atrial Fibrillation with RVR    RADIOLOGY & ADDITIONAL STUDIES/ECHO/CARDIAC CATH:                   < from: MARINA Echo Doppler (03.08.19 @ 12:01) >    Summary:   1. Technically good study.   2. Normal global left ventricular systolic function.   3. Left ventricular ejection fraction, by visual estimation, is 50 to   55%.   4. The mitral in-flow pattern reveals no discernable A-wave, therefore   no comment on diastolic function can be made.   5. Color flow doppler and intravenous injection of agitated saline   demonstrates the presence of an intact intra atrial septum.   6. Left atrial enlargement.   7. Normal right ventricular size and function.   8. Mild tricuspid regurgitation.   9. No left atrial appendage thrombus.  10. There is no evidence of pericardial effusion.  11. MARINA probe was removed. Mr. Garcia was cardioverted by Dr. Aaron.    [id number] Zeyad Burgess MD, Electronically signed on 3/8/2019 at 5:05:17 PM              *** Final ***                < from: CT Angio Chest w/ IV Cont (06.29.19 @ 15:55) >  MPRESSION:  Bibasilar posterior sulcus symmetric airspace consolidations.   No evidence of pulmonary embolism.  Cardiomegaly.   LEFT atrial enlargement.                JESUS ALBERTO GREY M.D., ATTENDING RADIOLOGIST  This document has been electronically signed. Jun 29 2019  4:14PM        < end of copied text >    ZEYAD BURGESS   This document has been electronically signed. Mar  8 2019 12:01PM        < end of copied text >      < from: Xray Chest 1 View-PORTABLE IMMEDIATE (06.29.19 @ 13:45) >  IMPRESSION:     Small left pleural effusion with basilar atelectasis or consolidation.     < end of copied text >

## 2019-06-29 NOTE — ED STATDOCS - PROGRESS NOTE DETAILS
57 y/o M pt with significant PMHx of Atrial fibrillation and flutter, DVT, GERD (gastroesophageal reflux disease)  and Renal stones  presents to the ED c/o CP and back pain. Currently on Eliquis. Pt states he had an abrasion due to A-fib and felt fine after surgery. Concern for ACS, PE due to prolonged anesthesia. Possible pericardial infusion or Myocardial ablation, but given persistent pain in the back will obtain a CT and re-evaluate further. Pt is already on Asprin. Pt transferred to main ED for further evaluation by another provider.

## 2019-06-29 NOTE — H&P ADULT - HISTORY OF PRESENT ILLNESS
59 y/o male with s/p cardiac ablation for a flutter 3 days ago, h/o DVT on Eliquis came to the ED complaining of chest pain radiating to the back x 1 day. Patient had ablation done 3 days ago, said he was fine after the procedure but on Friday started having chest pain that felt like muscle pull radiating to the back worsen with respiration; associated with dry cough.  He called Dr. Aaron today who recommended to come to the ED. He has no fever, chills, palpitation, diaphoresis, nausea/vomiting, abdominal pain, weakness. HA.

## 2019-06-30 DIAGNOSIS — I48.91 UNSPECIFIED ATRIAL FIBRILLATION: ICD-10-CM

## 2019-06-30 PROCEDURE — 99233 SBSQ HOSP IP/OBS HIGH 50: CPT

## 2019-06-30 PROCEDURE — 99232 SBSQ HOSP IP/OBS MODERATE 35: CPT

## 2019-06-30 RX ORDER — SACCHAROMYCES BOULARDII 250 MG
250 POWDER IN PACKET (EA) ORAL
Refills: 0 | Status: DISCONTINUED | OUTPATIENT
Start: 2019-06-30 | End: 2019-07-04

## 2019-06-30 RX ORDER — METOPROLOL TARTRATE 50 MG
25 TABLET ORAL EVERY 6 HOURS
Refills: 0 | Status: DISCONTINUED | OUTPATIENT
Start: 2019-06-30 | End: 2019-07-04

## 2019-06-30 RX ORDER — CEFTRIAXONE 500 MG/1
1000 INJECTION, POWDER, FOR SOLUTION INTRAMUSCULAR; INTRAVENOUS EVERY 24 HOURS
Refills: 0 | Status: COMPLETED | OUTPATIENT
Start: 2019-06-30 | End: 2019-07-03

## 2019-06-30 RX ORDER — AZITHROMYCIN 500 MG/1
500 TABLET, FILM COATED ORAL DAILY
Refills: 0 | Status: COMPLETED | OUTPATIENT
Start: 2019-06-30 | End: 2019-07-03

## 2019-06-30 RX ADMIN — APIXABAN 5 MILLIGRAM(S): 2.5 TABLET, FILM COATED ORAL at 17:44

## 2019-06-30 RX ADMIN — Medication 250 MILLIGRAM(S): at 05:57

## 2019-06-30 RX ADMIN — AZITHROMYCIN 500 MILLIGRAM(S): 500 TABLET, FILM COATED ORAL at 09:15

## 2019-06-30 RX ADMIN — APIXABAN 5 MILLIGRAM(S): 2.5 TABLET, FILM COATED ORAL at 05:59

## 2019-06-30 RX ADMIN — Medication 25 MILLIGRAM(S): at 23:40

## 2019-06-30 RX ADMIN — PANTOPRAZOLE SODIUM 40 MILLIGRAM(S): 20 TABLET, DELAYED RELEASE ORAL at 05:57

## 2019-06-30 RX ADMIN — CEFTRIAXONE 1000 MILLIGRAM(S): 500 INJECTION, POWDER, FOR SOLUTION INTRAMUSCULAR; INTRAVENOUS at 05:57

## 2019-06-30 RX ADMIN — Medication 250 MILLIGRAM(S): at 17:44

## 2019-06-30 RX ADMIN — Medication 25 MILLIGRAM(S): at 09:56

## 2019-06-30 RX ADMIN — PANTOPRAZOLE SODIUM 40 MILLIGRAM(S): 20 TABLET, DELAYED RELEASE ORAL at 17:44

## 2019-06-30 RX ADMIN — Medication 25 MILLIGRAM(S): at 17:44

## 2019-06-30 NOTE — PROGRESS NOTE ADULT - SUBJECTIVE AND OBJECTIVE BOX
CC:  Patient is a 58y old  Male who presents with a chief complaint of Afib (30 Jun 2019 09:14)    HPI:  59 y/o male with s/p cardiac ablation for a flutter 3 days ago, h/o DVT on Eliquis came to the ED complaining of chest pain radiating to the back x 1 day. Patient had ablation done 3 days ago, said he was fine after the procedure but on Friday started having chest pain that felt like muscle pull radiating to the back worsen with respiration; associated with dry cough.  He called Dr. Aaron today who recommended to come to the ED. He has no fever, chills, palpitation, diaphoresis, nausea/vomiting, abdominal pain, weakness. HA. (29 Jun 2019 21:27)    Interval HPI: "I feel much better, it's weird I don't really get palpitations"    ROS: All review of systems negative unless indicated otherwise below.     Lab Results:  CBC  CBC Full  -  ( 29 Jun 2019 12:38 )  WBC Count : 9.72 K/uL  RBC Count : 4.44 M/uL  Hemoglobin : 12.9 g/dL  Hematocrit : 39.7 %  Platelet Count - Automated : 174 K/uL  Mean Cell Volume : 89.4 fl  Mean Cell Hemoglobin : 29.1 pg  Mean Cell Hemoglobin Concentration : 32.5 gm/dL  Auto Neutrophil # : 7.34 K/uL  Auto Lymphocyte # : 1.09 K/uL  Auto Monocyte # : 1.20 K/uL  Auto Eosinophil # : 0.01 K/uL  Auto Basophil # : 0.03 K/uL  Auto Neutrophil % : 75.6 %  Auto Lymphocyte % : 11.2 %  Auto Monocyte % : 12.3 %  Auto Eosinophil % : 0.1 %  Auto Basophil % : 0.3 %    Chemistry                        12.9   9.72  )-----------( 174      ( 29 Jun 2019 12:38 )             39.7     06-29    139  |  104  |  14.0  ----------------------------<  113  3.8   |  25.0  |  0.82    Ca    8.9      29 Jun 2019 12:38    TPro  6.8  /  Alb  4.0  /  TBili  1.3  /  DBili  x   /  AST  29  /  ALT  22  /  AlkPhos  58  06-29    LIVER FUNCTIONS - ( 29 Jun 2019 12:38 )  Alb: 4.0 g/dL / Pro: 6.8 g/dL / ALK PHOS: 58 U/L / ALT: 22 U/L / AST: 29 U/L / GGT: x           PT/INR - ( 29 Jun 2019 12:38 )   PT: 14.0 sec;   INR: 1.21 ratio    PTT - ( 29 Jun 2019 12:38 )  PTT:28.5 sec    RADIOLOGY RESULTS: Personally visualized and reviewed    < from: Xray Chest 1 View-PORTABLE IMMEDIATE (06.29.19 @ 13:45) >  IMPRESSION:     Small left pleural effusion with basilar atelectasis or consolidation.     < end of copied text >    < from: MARINA Echo Doppler (03.08.19 @ 12:01) >  Summary:   1. Technically good study.   2. Normal global left ventricular systolic function.   3. Left ventricular ejection fraction, by visual estimation, is 50 to   55%.   4. The mitral in-flow pattern reveals no discernable A-wave, therefore   no comment on diastolic function can be made.   5. Color flow doppler and intravenous injection of agitated saline   demonstrates the presence of an intact intra atrial septum.   6. Left atrial enlargement.   7. Normal right ventricular size and function.   8. Mild tricuspid regurgitation.   9. No left atrial appendage thrombus.  10. There is no evidence of pericardial effusion.  11. MARINA probe was removed. Mr. Garcia was cardioverted by Dr. Aaron.      < end of copied text >      CARDIAC MARKERS ( 29 Jun 2019 23:06 )  x     / 0.28 ng/mL / x     / x     / x      CARDIAC MARKERS ( 29 Jun 2019 12:38 )  x     / 0.54 ng/mL / 342 U/L / x     / 4.2 ng/mL    MEDICATIONS  (STANDING):  apixaban 5 milliGRAM(s) Oral every 12 hours  azithromycin   Tablet 500 milliGRAM(s) Oral daily  cefTRIAXone Injectable. 1000 milliGRAM(s) IV Push every 24 hours  metoprolol tartrate 25 milliGRAM(s) Oral every 6 hours  pantoprazole    Tablet 40 milliGRAM(s) Oral two times a day  saccharomyces boulardii 250 milliGRAM(s) Oral two times a day    PHYSICAL EXAM:  Vital Signs Last 24 Hrs  T(C): 36.6 (30 Jun 2019 10:14), Max: 36.9 (30 Jun 2019 05:53)  T(F): 97.9 (30 Jun 2019 10:14), Max: 98.5 (30 Jun 2019 05:53)  HR: 73 (30 Jun 2019 10:14) (73 - 122)  BP: 116/66 (30 Jun 2019 10:14) (105/69 - 124/82)  BP(mean): 82 (29 Jun 2019 22:57) (82 - 82)  RR: 18 (30 Jun 2019 10:14) (16 - 20)  SpO2: 95% (30 Jun 2019 05:53) (94% - 98%)    GENERAL: NAD, well-groomed, well-developed  HEAD:  Atraumatic, Normocephalic  NECK: Supple, No JVD  NERVOUS SYSTEM:  Alert & Oriented X3, Good concentration; Motor Strength 5/5 B/L upper and lower extremities, sensation intact  CHEST/LUNG: Clear to auscultation bilateral upper lobes, B/l Lower lobes fine crackles, No rales, rhonchi, wheezing, or rubs  HEART: Regularly irregular rhythm, tachycardia ; s1 and s2 auscultated, No murmurs, rubs, or gallops  ABDOMEN: Soft, Nontender, Nondistended; Bowel sounds present and normoactive.   EXTREMITIES:  2+ Peripheral Pulses, No clubbing, cyanosis, or edema  SKIN: No rashes or lesions

## 2019-06-30 NOTE — PROGRESS NOTE ADULT - SUBJECTIVE AND OBJECTIVE BOX
CC: AFib    INTERVAL HPI/OVERNIGHT EVENTS: Patient seen and examined, chest pain improved. Denies palpitations or shortness of breath. AFIb with RVR hr in the 120s this morning.       Vital Signs Last 24 Hrs  T(C): 36.9 (30 Jun 2019 05:53), Max: 36.9 (30 Jun 2019 05:53)  T(F): 98.5 (30 Jun 2019 05:53), Max: 98.5 (30 Jun 2019 05:53)  HR: 122 (30 Jun 2019 09:57) (98 - 122)  BP: 119/79 (30 Jun 2019 09:57) (105/69 - 124/82)  BP(mean): 82 (29 Jun 2019 22:57) (82 - 82)  RR: 16 (30 Jun 2019 05:53) (16 - 20)  SpO2: 95% (30 Jun 2019 05:53) (94% - 98%)    PHYSICAL EXAM:    GENERAL: NAD, AOX3  ENMT: Moist mucous membranes  NECK: Supple, No JVD  CHEST/LUNG: Clear to auscultation bilaterally; No rales, rhonchi, wheezing, or rubs  HEART: IRR; No murmurs, rubs, or gallops  ABDOMEN: Soft, Nontender, Nondistended; Bowel sounds present  EXTREMITIES:  2+ Peripheral Pulses, No clubbing, cyanosis, or edema        MEDICATIONS  (STANDING):  apixaban 5 milliGRAM(s) Oral every 12 hours  azithromycin   Tablet 500 milliGRAM(s) Oral daily  cefTRIAXone Injectable. 1000 milliGRAM(s) IV Push every 24 hours  metoprolol tartrate 25 milliGRAM(s) Oral every 6 hours  pantoprazole    Tablet 40 milliGRAM(s) Oral two times a day  saccharomyces boulardii 250 milliGRAM(s) Oral two times a day    MEDICATIONS  (PRN):      Allergies    No Known Allergies    Intolerances          LABS:                          12.9   9.72  )-----------( 174      ( 29 Jun 2019 12:38 )             39.7     06-29    139  |  104  |  14.0  ----------------------------<  113  3.8   |  25.0  |  0.82    Ca    8.9      29 Jun 2019 12:38    TPro  6.8  /  Alb  4.0  /  TBili  1.3  /  DBili  x   /  AST  29  /  ALT  22  /  AlkPhos  58  06-29    PT/INR - ( 29 Jun 2019 12:38 )   PT: 14.0 sec;   INR: 1.21 ratio         PTT - ( 29 Jun 2019 12:38 )  PTT:28.5 sec      RADIOLOGY & ADDITIONAL TESTS:

## 2019-06-30 NOTE — PROGRESS NOTE ADULT - ASSESSMENT
58 year old male with history of GERD, DVT 8/2018 after 5 hour flight, atrial flutter s/p DCCV 3/8/19 with restoration of sinus rhythm. He is now s/p successful ablation for typical right atrial flutter; ablation along the cavotricuspid isthmus was performed and bidirectional block across the CTI was confirmed; successful pulmonary vein isolation of all four veins. Patient now comes with back and chest pain. Patient is in atrial fibrillation with RVR. Patients chest X ray and Chest CT as noted above

## 2019-06-30 NOTE — PROGRESS NOTE ADULT - PROBLEM SELECTOR PLAN 1
Afib w/ episodes of RVR   Continue metoprolol 25mg Q6h, adjust accordingly  continue eliquis  Recent ablation  NPO @ [facility] for MARINA/CV tomorrow Afib w/ episodes of RVR   Continue metoprolol 25mg Q6h, adjust accordingly  continue eliquis  Recent ablation  NPO @ [facility] for MARINA/DCCV tomorrow

## 2019-06-30 NOTE — PROGRESS NOTE ADULT - ASSESSMENT
57 y/o male with s/p cardiac ablation for a flutter 3 days ago, h/o DVT on Eliquis came to the ED complaining of chest pain radiating to the back x 1 day. Patient had ablation done 3 days ago, said he was fine after the procedure but on Friday started having chest pain that felt like muscle pull radiating to the back worsen with respiration; associated with dry cough.  He called Dr. Aaron today who recommended to come to the ED. EKG in the ER consistent with Afib with rvr. CTA of the chest was negative. Troponin elevated to 0.54 repeat of 0.2 with BNp elevated in the 700s. Evaluated by cardiology recommended rate control with lopressor and repeat Echocardiogram. CTA of the chest showed evidence of bibasilar infiltrates; started on IV rocephin for pneumonia.    Assessment/Plan:    1. Afib with RVR status post recent ablation: Continue telemetry monitoring  On Metoprolol Q6 hours; titrate up as tolerated  Follow up echocardiogram  Cardiology following    2. Bibasilar pneumonia: On IV rocephin   + Florastor    3. History of DVT on Eliquis BID    4. GERD on PPI    DVT prophylaxis: Eliquis   GI prophylaxis: PPI 40mg   Diet: DASH 57 y/o male with s/p cardiac ablation for a flutter 3 days ago, h/o DVT on Eliquis came to the ED complaining of chest pain radiating to the back x 1 day. Patient had ablation done 3 days ago, said he was fine after the procedure but on Friday started having chest pain that felt like muscle pull radiating to the back worsen with respiration; associated with dry cough.  He called Dr. Aaron today who recommended to come to the ED. EKG in the ER consistent with Afib with rvr. CTA of the chest was negative. Troponin elevated to 0.54 repeat of 0.2 with BNp elevated in the 700s. Evaluated by cardiology recommended rate control with lopressor and repeat Echocardiogram. CTA of the chest showed evidence of bibasilar infiltrates; started on IV rocephin for pneumonia.    Assessment/Plan:    1. Afib with RVR status post recent ablation: Continue telemetry monitoring  No rate control medications given since patient presented in the ER; now remains tachycardic. Will start PO metoprolol 25mg Q6 hours as recommended by cardiology last night.   Follow up echocardiogram  Cardiology following    2. Bibasilar pneumonia: On IV rocephin   + Florastor    3. History of DVT on Eliquis BID    4. GERD on PPI    DVT prophylaxis: Eliquis   GI prophylaxis: PPI 40mg   Diet: DASH 59 y/o male with s/p cardiac ablation for a flutter 3 days ago, h/o DVT on Eliquis came to the ED complaining of chest pain radiating to the back x 1 day. Patient had ablation done 3 days ago, said he was fine after the procedure but on Friday started having chest pain that felt like muscle pull radiating to the back worsen with respiration; associated with dry cough.  He called Dr. Aaron today who recommended to come to the ED. EKG in the ER consistent with Afib with rvr. CTA of the chest was negative. Troponin elevated to 0.54 repeat of 0.2 with BNp elevated in the 700s. Evaluated by cardiology recommended rate control with lopressor and repeat Echocardiogram. CTA of the chest showed evidence of bibasilar infiltrates; started on IV rocephin for pneumonia.    Assessment/Plan:    1. Afib with RVR status post recent ablation: Continue telemetry monitoring  No rate control medications given since patient presented in the ER; now remains tachycardic. Will start PO metoprolol 25mg Q6 hours as recommended by cardiology   Follow up echocardiogram  Cardiology following    2. Bibasilar pneumonia: On IV rocephin   + Florastor    3. History of DVT on Eliquis BID    4. GERD on PPI    DVT prophylaxis: Eliquis   GI prophylaxis: PPI 40mg

## 2019-07-01 LAB
ANION GAP SERPL CALC-SCNC: 10 MMOL/L — SIGNIFICANT CHANGE UP (ref 5–17)
BUN SERPL-MCNC: 16 MG/DL — SIGNIFICANT CHANGE UP (ref 8–20)
CALCIUM SERPL-MCNC: 8.9 MG/DL — SIGNIFICANT CHANGE UP (ref 8.6–10.2)
CHLORIDE SERPL-SCNC: 105 MMOL/L — SIGNIFICANT CHANGE UP (ref 98–107)
CO2 SERPL-SCNC: 26 MMOL/L — SIGNIFICANT CHANGE UP (ref 22–29)
CREAT SERPL-MCNC: 0.96 MG/DL — SIGNIFICANT CHANGE UP (ref 0.5–1.3)
GLUCOSE SERPL-MCNC: 91 MG/DL — SIGNIFICANT CHANGE UP (ref 70–115)
HCT VFR BLD CALC: 40.2 % — SIGNIFICANT CHANGE UP (ref 39–50)
HGB BLD-MCNC: 13.3 G/DL — SIGNIFICANT CHANGE UP (ref 13–17)
MCHC RBC-ENTMCNC: 29.4 PG — SIGNIFICANT CHANGE UP (ref 27–34)
MCHC RBC-ENTMCNC: 33.1 GM/DL — SIGNIFICANT CHANGE UP (ref 32–36)
MCV RBC AUTO: 88.7 FL — SIGNIFICANT CHANGE UP (ref 80–100)
PLATELET # BLD AUTO: 188 K/UL — SIGNIFICANT CHANGE UP (ref 150–400)
POTASSIUM SERPL-MCNC: 3.9 MMOL/L — SIGNIFICANT CHANGE UP (ref 3.5–5.3)
POTASSIUM SERPL-SCNC: 3.9 MMOL/L — SIGNIFICANT CHANGE UP (ref 3.5–5.3)
RBC # BLD: 4.53 M/UL — SIGNIFICANT CHANGE UP (ref 4.2–5.8)
RBC # FLD: 12.8 % — SIGNIFICANT CHANGE UP (ref 10.3–14.5)
SODIUM SERPL-SCNC: 141 MMOL/L — SIGNIFICANT CHANGE UP (ref 135–145)
WBC # BLD: 8.1 K/UL — SIGNIFICANT CHANGE UP (ref 3.8–10.5)
WBC # FLD AUTO: 8.1 K/UL — SIGNIFICANT CHANGE UP (ref 3.8–10.5)

## 2019-07-01 PROCEDURE — 93010 ELECTROCARDIOGRAM REPORT: CPT | Mod: 77

## 2019-07-01 PROCEDURE — 92960 CARDIOVERSION ELECTRIC EXT: CPT

## 2019-07-01 PROCEDURE — 99233 SBSQ HOSP IP/OBS HIGH 50: CPT

## 2019-07-01 PROCEDURE — 93010 ELECTROCARDIOGRAM REPORT: CPT

## 2019-07-01 PROCEDURE — 99232 SBSQ HOSP IP/OBS MODERATE 35: CPT

## 2019-07-01 RX ORDER — IBUTILIDE FUMARATE 0.1 MG/ML
1 INJECTION, SOLUTION INTRAVENOUS ONCE
Refills: 0 | Status: COMPLETED | OUTPATIENT
Start: 2019-07-01 | End: 2019-07-01

## 2019-07-01 RX ADMIN — APIXABAN 5 MILLIGRAM(S): 2.5 TABLET, FILM COATED ORAL at 18:32

## 2019-07-01 RX ADMIN — CEFTRIAXONE 1000 MILLIGRAM(S): 500 INJECTION, POWDER, FOR SOLUTION INTRAMUSCULAR; INTRAVENOUS at 06:15

## 2019-07-01 RX ADMIN — PANTOPRAZOLE SODIUM 40 MILLIGRAM(S): 20 TABLET, DELAYED RELEASE ORAL at 06:16

## 2019-07-01 RX ADMIN — Medication 250 MILLIGRAM(S): at 06:16

## 2019-07-01 RX ADMIN — APIXABAN 5 MILLIGRAM(S): 2.5 TABLET, FILM COATED ORAL at 06:15

## 2019-07-01 RX ADMIN — AZITHROMYCIN 500 MILLIGRAM(S): 500 TABLET, FILM COATED ORAL at 21:30

## 2019-07-01 RX ADMIN — IBUTILIDE FUMARATE 360 MILLIGRAM(S): 0.1 INJECTION, SOLUTION INTRAVENOUS at 16:40

## 2019-07-01 RX ADMIN — Medication 25 MILLIGRAM(S): at 06:15

## 2019-07-01 RX ADMIN — Medication 25 MILLIGRAM(S): at 18:37

## 2019-07-01 RX ADMIN — Medication 25 MILLIGRAM(S): at 23:50

## 2019-07-01 RX ADMIN — Medication 25 MILLIGRAM(S): at 11:22

## 2019-07-01 RX ADMIN — PANTOPRAZOLE SODIUM 40 MILLIGRAM(S): 20 TABLET, DELAYED RELEASE ORAL at 21:30

## 2019-07-01 RX ADMIN — IBUTILIDE FUMARATE 360 MILLIGRAM(S): 0.1 INJECTION, SOLUTION INTRAVENOUS at 17:13

## 2019-07-01 RX ADMIN — Medication 250 MILLIGRAM(S): at 21:30

## 2019-07-01 NOTE — PROGRESS NOTE ADULT - SUBJECTIVE AND OBJECTIVE BOX
Pt presented for DCCV. NPO since midnight.  +eliquis at 6am today 7/1/19  POD #4 s/p atrial flutter and atrial fibrillation ablation admitted with AF RVR and b/l PNA      MEDICATIONS  (STANDING):  apixaban 5 milliGRAM(s) Oral every 12 hours  azithromycin   Tablet 500 milliGRAM(s) Oral daily  cefTRIAXone Injectable. 1000 milliGRAM(s) IV Push every 24 hours  metoprolol tartrate 25 milliGRAM(s) Oral every 6 hours  pantoprazole    Tablet 40 milliGRAM(s) Oral two times a day  saccharomyces boulardii 250 milliGRAM(s) Oral two times a day    Allergies:  No Known Allergies    Vital Signs Last 24 Hrs  T(C): 36.4 (01 Jul 2019 10:30), Max: 36.7 (01 Jul 2019 06:14)  T(F): 97.6 (01 Jul 2019 10:30), Max: 98.1 (01 Jul 2019 06:14)  HR: 112 atrial fibrillation   BP: 148/72  RR: 16    Physical Exam:  Constitutional: NAD, AAOx3  Cardiovascular: irregular, tachycardic S1S2  Pulmonary: CTA b/l, unlabored  GI: soft NTND +BS  Extremities: no pedal edema, +distal pulses b/l  Neuro: non focal, CARMICHAEL x4    LABS:                        13.3   8.10  )-----------( 188      ( 01 Jul 2019 06:54 )             40.2     07-01    141  |  105  |  16.0  ----------------------------<  91  3.9   |  26.0  |  0.96    Ca    8.9      01 Jul 2019 06:54     EXAM:  CT ANGIO CHEST (W)AW IC                        PROCEDURE DATE:  06/29/2019    IMPRESSION:  Bibasilar posterior sulcus symmetric airspace consolidations.   No evidence of pulmonary embolism.  Cardiomegaly.   LEFT atrial enlargement.    EXAM:  ECHO TRANSTHORACIC COMP W DOPP  ,PROCEDURE DATE:  Jul 1 2019 : EF 50-55%, negative for pericardial effusion, mild MR      A/P: 57 yo male with pmhx GERD, DVT 8/2018, atrial flutter s/p MARINA/DCCV 3/8/19,  persistent atrial fibrillation who previously failed multaq s/p recent elective atrial flutter and atrial fibrillation (6/27/19-[facility]) maintained on eliquis. Pt was discharged in sinus rhythm 6/28/19. Pt presented to ER 6/29/19 with c/o of back and chest pain. He was admitted with AF RVR and an incidental finding of b/l PNA on CT (CT was negative for atrial-esophageal fistula).  Pt is now s/p attempted DCCV 200J x 1, reattempt at 200J x 1 and then ibutilide 1mg IV x 2 doses given 10 minutes apart. Pt currently in atrial fibrillation, 9 beats of NSVT @ 18:06, with PVCs and couplets.    1. AF with RVR, atrial flutter with variable conduction  -continue eliquis 5 mg po bid-uninterrupted  -continue metoprolol  -will consider flecainide 50mg po bid to start in am  -heart rate may likely be secondary to b/l PNA, as acute illness subsides, heart rate may be better controlled  -daily EKGs    2. NSVT, couplets and PVCs s/p ibutilide infusions  -continue 1:1 nursing care, with defib pads on pt and zoll bedside for 4 hours. If ventricular ectopy continues >4hours, consider upgrade to MICU as pt requires 1:1 monitoring for ventricular arrhythmias  -am labs BMP,mg    3.PNA-hospital acquired  -await pulm eval  -continue current ABX    Plan DW pt and family  Dr Aaron spoke with Dr Stafford in MICU if upgrade is necessary

## 2019-07-01 NOTE — PROGRESS NOTE ADULT - ATTENDING COMMENTS
Patient seen and examined by me.  I have discussed my recommendation with the PA which are outlined above.  If discharged today, patient will f/u with Dr Aguirre

## 2019-07-01 NOTE — PROGRESS NOTE ADULT - ASSESSMENT
59 y/o male with s/p cardiac ablation for a flutter 3 days ago, h/o DVT on Eliquis came to the ED complaining of chest pain radiating to the back x 1 day. Patient had ablation done 3 days ago, said he was fine after the procedure but on Friday started having chest pain that felt like muscle pull radiating to the back worsen with respiration; associated with dry cough.  He called Dr. Aaron today who recommended to come to the ED. EKG in the ER consistent with Afib with rvr. CTA of the chest was negative. Troponin elevated to 0.54 repeat of 0.2 with BNp elevated in the 700s. Evaluated by cardiology recommended rate control with lopressor and repeat Echocardiogram. CTA of the chest showed evidence of bibasilar infiltrates; started on IV rocephin for pneumonia.    Assessment/Plan:    1. Afib with RVR status post recent ablation for a flutter 3d prior to admit:   complicated by nstemi, tachy induced w/ poss elevation also from recent ablation procedure, peak trop 0.54  Continue telemetry monitoring  PO metoprolol 25mg Q6 hours as recommended by cardiology   Follow up echocardiogram report, already performed  ss Cardiology following, npo for dccv today  on doac for dvt, chadsvasc 0    2. Bibasilar pneumonia:   noted on ct  On rocephin + zithro, 5d course, last day 7/3  Florastor    3. 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 pmd, cardio, ep 59 y/o male with s/p cardiac ablation for a flutter 3 days ago, h/o DVT on Eliquis came to the ED complaining of chest pain radiating to the back x 1 day. Patient had ablation done 3 days ago, said he was fine after the procedure but on Friday started having chest pain that felt like muscle pull radiating to the back worsen with respiration; associated with dry cough.  He called Dr. Aaron today who recommended to come to the ED. EKG in the ER consistent with Afib with rvr. CTA of the chest was negative. Troponin elevated to 0.54 repeat of 0.2 with BNp elevated in the 700s. Evaluated by cardiology recommended rate control with lopressor and repeat Echocardiogram. CTA of the chest showed evidence of bibasilar infiltrates; started on IV rocephin for pneumonia.    Assessment/Plan:    1. Afib with RVR status post recent ablation for a flutter 3d prior to admit: tachycardia persistent  complicated by nstemi, tachy induced w/ poss elevation also from recent ablation procedure, peak trop 0.54  Continue telemetry monitoring  PO metoprolol 25mg Q6 hours as recommended by cardiology   Follow up echocardiogram report, already performed  ss Cardiology following, npo for dccv today  on doac for ablation procedure, to be continued post cardioversion, usually for 3wks, final duration per cardio/ep    2. Bibasilar pneumonia: stable.   noted on ct, pt minimally symptomatic  On rocephin + zithro, 5d course, last day 7/3  Florastor    3. History of RT Gastrocnemius DVTon Eliquis BID  dx 09/2018, already completed 3mo treatment, and eliquis stopped  resolution confirmed outpt on us ordered by his vasc sx Dr Villagran    DVT prophylaxis: Eliquis     dispo: likely dc home in 1-2 days     outpt fu. pmd, cardio, ep

## 2019-07-01 NOTE — PROGRESS NOTE ADULT - SUBJECTIVE AND OBJECTIVE BOX
CC:    Patient seen and examined at the bedside. No acute overnight events. - yesterday. Complaining of - today. Denies fever/chills, headache, lightheadedness, dizziness, chest pain, palpitations, shortness of breath, cough, abd pain, nausea/vomiting/diarrhea, muscle pain.      =========================================================================================  T(C): 36.4 (19 @ 10:30), Max: 36.7 (19 @ 06:14)  HR: 133 (19 @ 10:30) (66 - 133)  BP: 113/77 (19 @ 10:30) (113/77 - 132/79)  RR: 18 (19 @ 10:30) (18 - 18)  SpO2: 99% (19 @ 10:30) (92% - 99%)    PHYSICAL EXAM.    GEN - appears age appropriate. well nourished. pleasant. no distress.   HEENT - NCAT, EOMI, LILY  RESP - CTA BL, no wheeze/stridor/rhonchi/crackles. not on supplemental O2. able to speak in full sentences without distress.   CARDIO - NS1S2, RRR. No murmurs/rubs/gallops.  ABD - Soft/Non tender/Non distended. Normal BS x4 quadrants. no guarding/rebound tenderness.  Ext - No MORIAH.  MSK - BL 5/5 strength on upper and lower extremities.   Neuro - AAOx3. cn 2-12 grossly intact  Psych - normal affect  Skin - c/d/i. no rashes/lesions      I&O's Summary    2019 07:01  -  2019 07:00  --------------------------------------------------------  IN: 100 mL / OUT: 300 mL / NET: -200 mL      Daily     Daily Weight in k.5 (2019 04:40)    =========================================================================================  LABS.    01    141  |  105  |  16.0  ----------------------------<  91  3.9   |  26.0  |  0.96    Ca    8.9      2019 06:54                            13.3   8.10  )-----------( 188      ( 2019 06:54 )             40.2         CARDIAC MARKERS ( 2019 23:06 )  x     / 0.28 ng/mL / x     / x     / x                =========================================================================================  IMAGING.     =========================================================================================    HOME MEDS.    Home Medications:      =========================================================================================    HOSPITAL MEDS.    MEDICATIONS  (STANDING):  apixaban 5 milliGRAM(s) Oral every 12 hours  azithromycin   Tablet 500 milliGRAM(s) Oral daily  cefTRIAXone Injectable. 1000 milliGRAM(s) IV Push every 24 hours  metoprolol tartrate 25 milliGRAM(s) Oral every 6 hours  pantoprazole    Tablet 40 milliGRAM(s) Oral two times a day  saccharomyces boulardii 250 milliGRAM(s) Oral two times a day    MEDICATIONS  (PRN): CC: rvr    Patient seen and examined at the bedside. No acute overnight events. no events yesterday. Complaining of mild intermittent cough today. Denies fever/chills, headache, lightheadedness, dizziness, chest pain, palpitations, shortness of breath,  abd pain, nausea/vomiting/diarrhea, muscle pain.      =========================================================================================  T(C): 36.4 (19 @ 10:30), Max: 36.7 (19 @ 06:14)  HR: 133 (19 @ 10:30) (66 - 133)  BP: 113/77 (19 @ 10:30) (113/77 - 132/79)  RR: 18 (19 @ 10:30) (18 - 18)  SpO2: 99% (19 @ 10:30) (92% - 99%)    PHYSICAL EXAM.    GEN - appears age appropriate. well nourished. pleasant. no distress.   HEENT - NCAT, EOMI, LILY  RESP - CTA BL, no wheeze/stridor/rhonchi/crackles. not on supplemental O2. able to speak in full sentences without distress.   CARDIO - irregularly irregular, tachycardic. No murmurs/rubs/gallops.  ABD - Soft/Non tender/Non distended. Normal BS x4 quadrants. no guarding/rebound tenderness.  Ext - No MORIAH.  MSK - BL 5/5 strength on upper and lower extremities.   Neuro - AAOx3. cn 2-12 grossly intact  Psych - normal affect  Skin - c/d/i. no rashes/lesions      I&O's Summary    2019 07:01  -  2019 07:00  --------------------------------------------------------  IN: 100 mL / OUT: 300 mL / NET: -200 mL      Daily     Daily Weight in k.5 (2019 04:40)    =========================================================================================  LABS.    07-01    141  |  105  |  16.0  ----------------------------<  91  3.9   |  26.0  |  0.96    Ca    8.9      2019 06:54                            13.3   8.10  )-----------( 188      ( 2019 06:54 )             40.2         CARDIAC MARKERS ( 2019 23:06 )  x     / 0.28 ng/mL / x     / x     / x        tele reviewed- afib w/ HR up to 150s, normally staying in 130-140s        =========================================================================================  IMAGING.     =========================================================================================    HOME MEDS.    Home Medications:      =========================================================================================    HOSPITAL MEDS.    MEDICATIONS  (STANDING):  apixaban 5 milliGRAM(s) Oral every 12 hours  azithromycin   Tablet 500 milliGRAM(s) Oral daily  cefTRIAXone Injectable. 1000 milliGRAM(s) IV Push every 24 hours  metoprolol tartrate 25 milliGRAM(s) Oral every 6 hours  pantoprazole    Tablet 40 milliGRAM(s) Oral two times a day  saccharomyces boulardii 250 milliGRAM(s) Oral two times a day    MEDICATIONS  (PRN):

## 2019-07-01 NOTE — PROGRESS NOTE ADULT - SUBJECTIVE AND OBJECTIVE BOX
SUBJECTIVE:  Cardiology NP F/U note: AFIB / denies complaints of chest pain/sob/dizziness/palps OOB ambulating / currently NPO for DCCV      	  MEDICATIONS  (STANDING):  apixaban 5 milliGRAM(s) Oral every 12 hours  azithromycin   Tablet 500 milliGRAM(s) Oral daily  cefTRIAXone Injectable. 1000 milliGRAM(s) IV Push every 24 hours  metoprolol tartrate 25 milliGRAM(s) Oral every 6 hours  pantoprazole    Tablet 40 milliGRAM(s) Oral two times a day  saccharomyces boulardii 250 milliGRAM(s) Oral two times a day    MEDICATIONS  (PRN):      PHYSICAL EXAM:    T(C): 36.4 (19 @ 10:30), Max: 36.7 (19 @ 06:14)  HR: 133 (19 @ 10:30) (66 - 133)  BP: 113/77 (19 @ 10:30) (113/77 - 132/79)  RR: 18 (19 @ 10:30) (18 - 18)  SpO2: 99% (19 @ 10:30) (92% - 99%)  Wt(kg): --    I&O's Summary    2019 07:01  -  2019 07:00  --------------------------------------------------------  IN: 100 mL / OUT: 300 mL / NET: -200 mL        Daily     Daily Weight in k.5 (2019 04:40)    Appearance: NAD  Cardiovascular: Normal S1 S2, IRREG 124 No JVD, No murmurs, No edema  Respiratory: Lungs clear to auscultation	  Psychiatry: A & O x 3, Mood & affect appropriate  Gastrointestinal:  Soft, Non-tender, + BS	  Skin: warm and dry  Neurologic: Non-focal  Extremities: Normal range of motion,:  Vascular: Peripheral pulses palpable 2+ bilaterally    TELEMETRY: 	 's    ECG:  	  RADIOLOGY:   DIAGNOSTIC TESTING:  [X] Echocardiogram:  < from: TTE Echo Complete w/Doppler (19 @ 10:19) >   Technically difficult study.   2. Endocardial visualization was enhanced with intravenous echo contrast.   3. Low normal LV systolic function. Left ventricular ejection fraction,   by visual estimation, is 50 to 55%.   4. RV systolic function is normal.   5. Mild tricuspid regurgitation.   6. Dilated aorta.   7. There is no evidence of pericardial effusion.   8. ** No prior TTEs available for comparison.    	    LABS:	 	    CARDIAC MARKERS:                                  13.3   8.10  )-----------( 188      ( 2019 06:54 )             40.2     07-    141  |  105  |  16.0  ----------------------------<  91  3.9   |  26.0  |  0.96    Ca    8.9      2019 06:54    TPro  6.8  /  Alb  4.0  /  TBili  1.3  /  DBili  x   /  AST  29  /  ALT  22  /  AlkPhos  58  06-      ASSESSMENT:    59 y/o male with s/p cardiac ablation for a flutter 3 days ago, h/o DVT on Eliquis came to the ED complaining of chest pain radiating to the back x 1 day.   found to be AF with RVR on Eliquis and BB   hemodynamically stable without chest pain/ afebrile with normal WBC/ no cough     Plan:  continue current meds Anticoagulation/ BB / IV ab for suspected pneumonia (consolidations on CT chest)   NPO for DCCV today   disposition based on above. SUBJECTIVE:  Cardiology NP F/U note: AFIB / denies complaints of chest pain/sob/dizziness/palps OOB ambulating / currently NPO for DCCV      	  MEDICATIONS  (STANDING):  apixaban 5 milliGRAM(s) Oral every 12 hours  azithromycin   Tablet 500 milliGRAM(s) Oral daily  cefTRIAXone Injectable. 1000 milliGRAM(s) IV Push every 24 hours  metoprolol tartrate 25 milliGRAM(s) Oral every 6 hours  pantoprazole    Tablet 40 milliGRAM(s) Oral two times a day  saccharomyces boulardii 250 milliGRAM(s) Oral two times a day    MEDICATIONS  (PRN):      PHYSICAL EXAM:    T(C): 36.4 (19 @ 10:30), Max: 36.7 (19 @ 06:14)  HR: 133 (19 @ 10:30) (66 - 133)  BP: 113/77 (19 @ 10:30) (113/77 - 132/79)  RR: 18 (19 @ 10:30) (18 - 18)  SpO2: 99% (19 @ 10:30) (92% - 99%)  Wt(kg): --    I&O's Summary    2019 07:01  -  2019 07:00  --------------------------------------------------------  IN: 100 mL / OUT: 300 mL / NET: -200 mL        Daily     Daily Weight in k.5 (2019 04:40)    Appearance: NAD  Cardiovascular: Normal S1 S2, IRREG 124 No JVD, No murmurs, No edema  Respiratory: Lungs clear to auscultation	  Psychiatry: A & O x 3, Mood & affect appropriate  Gastrointestinal:  Soft, Non-tender, + BS	  Skin: warm and dry  Neurologic: Non-focal  Extremities: Normal range of motion,:  Vascular: Peripheral pulses palpable 2+ bilaterally    TELEMETRY: 	 's    ECG:  	  RADIOLOGY:   DIAGNOSTIC TESTING:  [X] Echocardiogram:  < from: TTE Echo Complete w/Doppler (19 @ 10:19) >   Technically difficult study.   2. Endocardial visualization was enhanced with intravenous echo contrast.   3. Low normal LV systolic function. Left ventricular ejection fraction,   by visual estimation, is 50 to 55%.   4. RV systolic function is normal.   5. Mild tricuspid regurgitation.   6. Dilated aorta.   7. There is no evidence of pericardial effusion.   8. ** No prior TTEs available for comparison.    	    LABS:	 	    CARDIAC MARKERS:                                  13.3   8.10  )-----------( 188      ( 2019 06:54 )             40.2     07-    141  |  105  |  16.0  ----------------------------<  91  3.9   |  26.0  |  0.96    Ca    8.9      2019 06:54    TPro  6.8  /  Alb  4.0  /  TBili  1.3  /  DBili  x   /  AST  29  /  ALT  22  /  AlkPhos  58  06-      ASSESSMENT:    59 y/o male with s/p cardiac ablation for a flutter 3 days ago, h/o DVT on Eliquis came to the ED complaining of chest pain radiating to the back x 1 day.   found to be AF with RVR on Eliquis and BB  + troponins / likely related to rate and recent ablation/ EKG without acute sttw changes.  hemodynamically stable without chest pain/ afebrile with normal WBC/ no cough     Plan:  continue current meds Anticoagulation/ BB / IV ab for suspected pneumonia (consolidations on CT chest)   NPO for DCCV today   disposition based on above.

## 2019-07-01 NOTE — PROGRESS NOTE ADULT - ASSESSMENT
58 year old male with history of GERD, DVT 8/2018 after 5 hour flight, atrial flutter s/p DCCV 3/8/19 with restoration of sinus rhythm s/p RFA of AF/Flutter w/PVI + CTI 6/27/19 who presented with pleuritic chest pain. No AEF or pericarditis detected- CT scan c/w bibasilar PNA. Pt now s/p attempted DCCV x 2 + 1 mg IV ibutilide x 2 doses. Having occasional ventricular ectopy now.     Recommendations:  1. Continue telemetry obs x 4 hours- if ectopy still present, would move to ICU for o/n monitoring  2. Pending above, will initiate flecainide 100 mg BID tomorrow AM  3. Pulm c/s to comment on bibasilar infiltrates (odd presentation given no true sxs?)  4. Anticipate d/c tomorrow w/followup in 2 weeks as outpatient

## 2019-07-01 NOTE — PROGRESS NOTE ADULT - SUBJECTIVE AND OBJECTIVE BOX
SUBJECTIVE:  "I feel fine now."    OBJECTIVE:    MEDICATIONS  (STANDING):  apixaban 5 milliGRAM(s) Oral every 12 hours  azithromycin   Tablet 500 milliGRAM(s) Oral daily  cefTRIAXone Injectable. 1000 milliGRAM(s) IV Push every 24 hours  metoprolol tartrate 25 milliGRAM(s) Oral every 6 hours  pantoprazole    Tablet 40 milliGRAM(s) Oral two times a day  saccharomyces boulardii 250 milliGRAM(s) Oral two times a day    MEDICATIONS  (PRN):        Vital Signs Last 24 Hrs  T(C): 36.4 (01 Jul 2019 10:30), Max: 36.7 (01 Jul 2019 06:14)  T(F): 97.6 (01 Jul 2019 10:30), Max: 98.1 (01 Jul 2019 06:14)  HR: 133 (01 Jul 2019 10:30) (109 - 133)  BP: 113/77 (01 Jul 2019 10:30) (113/77 - 131/75)  BP(mean): --  RR: 18 (01 Jul 2019 10:30) (18 - 18)  SpO2: 99% (01 Jul 2019 10:30) (99% - 99%)      PHYSICAL EXAM:  Constitutional: NAD, AAOx3  Cardiovascular: +S1S2 RRR  Pulmonary: CTA b/l, unlabored  GI: soft NTND +BS  Extremities: no pedal edema, +distal pulses b/l  Neuro: non focal, CARMICHAEL x4  Psych: appropriate mood & affect      LABS:                        13.3   8.10  )-----------( 188      ( 01 Jul 2019 06:54 )             40.2     07-01    141  |  105  |  16.0  ----------------------------<  91  3.9   |  26.0  |  0.96    Ca    8.9      01 Jul 2019 06:54      RADIOLOGY & ADDITIONAL TESTS:  CT Chest 6/29/19 IMPRESSION:  Bibasilar posterior sulcus symmetric airspace consolidations.   No evidence of pulmonary embolism.  Cardiomegaly.   LEFT atrial enlargement.      EP DATA:  AF w/RVR to the 120s-30s  S/p DCCV + ibutilide with intermittent NSR, atrial tachycardia/flutter at 125 bpm, AF, and occasional PVC couplets/triplets or NSVT

## 2019-07-02 LAB
ANION GAP SERPL CALC-SCNC: 10 MMOL/L — SIGNIFICANT CHANGE UP (ref 5–17)
BUN SERPL-MCNC: 16 MG/DL — SIGNIFICANT CHANGE UP (ref 8–20)
CALCIUM SERPL-MCNC: 8.9 MG/DL — SIGNIFICANT CHANGE UP (ref 8.6–10.2)
CHLORIDE SERPL-SCNC: 105 MMOL/L — SIGNIFICANT CHANGE UP (ref 98–107)
CO2 SERPL-SCNC: 26 MMOL/L — SIGNIFICANT CHANGE UP (ref 22–29)
CREAT SERPL-MCNC: 0.86 MG/DL — SIGNIFICANT CHANGE UP (ref 0.5–1.3)
GLUCOSE SERPL-MCNC: 80 MG/DL — SIGNIFICANT CHANGE UP (ref 70–115)
MAGNESIUM SERPL-MCNC: 2.1 MG/DL — SIGNIFICANT CHANGE UP (ref 1.8–2.6)
POTASSIUM SERPL-MCNC: 3.9 MMOL/L — SIGNIFICANT CHANGE UP (ref 3.5–5.3)
POTASSIUM SERPL-SCNC: 3.9 MMOL/L — SIGNIFICANT CHANGE UP (ref 3.5–5.3)
SODIUM SERPL-SCNC: 141 MMOL/L — SIGNIFICANT CHANGE UP (ref 135–145)

## 2019-07-02 PROCEDURE — 99222 1ST HOSP IP/OBS MODERATE 55: CPT

## 2019-07-02 PROCEDURE — 93010 ELECTROCARDIOGRAM REPORT: CPT

## 2019-07-02 PROCEDURE — 99232 SBSQ HOSP IP/OBS MODERATE 35: CPT

## 2019-07-02 PROCEDURE — 99233 SBSQ HOSP IP/OBS HIGH 50: CPT

## 2019-07-02 RX ORDER — ALBUTEROL 90 UG/1
2.5 AEROSOL, METERED ORAL EVERY 6 HOURS
Refills: 0 | Status: DISCONTINUED | OUTPATIENT
Start: 2019-07-02 | End: 2019-07-04

## 2019-07-02 RX ORDER — SUCRALFATE 1 G
1 TABLET ORAL
Refills: 0 | Status: DISCONTINUED | OUTPATIENT
Start: 2019-07-02 | End: 2019-07-04

## 2019-07-02 RX ORDER — AMIODARONE HYDROCHLORIDE 400 MG/1
400 TABLET ORAL EVERY 8 HOURS
Refills: 0 | Status: DISCONTINUED | OUTPATIENT
Start: 2019-07-02 | End: 2019-07-03

## 2019-07-02 RX ORDER — METOPROLOL TARTRATE 50 MG
2.5 TABLET ORAL ONCE
Refills: 0 | Status: COMPLETED | OUTPATIENT
Start: 2019-07-02 | End: 2019-07-02

## 2019-07-02 RX ADMIN — AMIODARONE HYDROCHLORIDE 400 MILLIGRAM(S): 400 TABLET ORAL at 18:05

## 2019-07-02 RX ADMIN — Medication 250 MILLIGRAM(S): at 05:11

## 2019-07-02 RX ADMIN — Medication 105 MILLIGRAM(S): at 01:16

## 2019-07-02 RX ADMIN — Medication 250 MILLIGRAM(S): at 18:05

## 2019-07-02 RX ADMIN — Medication 25 MILLIGRAM(S): at 12:33

## 2019-07-02 RX ADMIN — PANTOPRAZOLE SODIUM 40 MILLIGRAM(S): 20 TABLET, DELAYED RELEASE ORAL at 05:11

## 2019-07-02 RX ADMIN — PANTOPRAZOLE SODIUM 40 MILLIGRAM(S): 20 TABLET, DELAYED RELEASE ORAL at 18:05

## 2019-07-02 RX ADMIN — AZITHROMYCIN 500 MILLIGRAM(S): 500 TABLET, FILM COATED ORAL at 08:35

## 2019-07-02 RX ADMIN — Medication 1 GRAM(S): at 18:05

## 2019-07-02 RX ADMIN — Medication 25 MILLIGRAM(S): at 23:04

## 2019-07-02 RX ADMIN — Medication 25 MILLIGRAM(S): at 05:11

## 2019-07-02 RX ADMIN — CEFTRIAXONE 1000 MILLIGRAM(S): 500 INJECTION, POWDER, FOR SOLUTION INTRAMUSCULAR; INTRAVENOUS at 05:11

## 2019-07-02 RX ADMIN — APIXABAN 5 MILLIGRAM(S): 2.5 TABLET, FILM COATED ORAL at 05:11

## 2019-07-02 RX ADMIN — APIXABAN 5 MILLIGRAM(S): 2.5 TABLET, FILM COATED ORAL at 18:05

## 2019-07-02 RX ADMIN — AMIODARONE HYDROCHLORIDE 400 MILLIGRAM(S): 400 TABLET ORAL at 10:00

## 2019-07-02 NOTE — PROGRESS NOTE ADULT - ATTENDING COMMENTS
Pt with persistent AT/Flutter post AF ablation resistant to AAD/DCCV. Will load with amiodarone and consider inpatient vs. outpatient repeat attempt at DCCV.

## 2019-07-02 NOTE — PROGRESS NOTE ADULT - SUBJECTIVE AND OBJECTIVE BOX
Patient seen today sitting up in bed. He offers no overnight complaints. He is eager to go home.     TELE: Atrial Flutter vs Atrial Tachycardia at 136bpm.     MEDICATIONS  (STANDING):  amiodarone    Tablet 400 milliGRAM(s) Oral every 8 hours  apixaban 5 milliGRAM(s) Oral every 12 hours  azithromycin   Tablet 500 milliGRAM(s) Oral daily  cefTRIAXone Injectable. 1000 milliGRAM(s) IV Push every 24 hours  metoprolol tartrate 25 milliGRAM(s) Oral every 6 hours  pantoprazole    Tablet 40 milliGRAM(s) Oral two times a day  saccharomyces boulardii 250 milliGRAM(s) Oral two times a day  sucralfate suspension 1 Gram(s) Oral two times a day    MEDICATIONS  (PRN):    Allergies  No Known Allergies    PAST MEDICAL & SURGICAL HISTORY:  Atrial fibrillation and flutter  Renal stones  DVT (deep venous thrombosis): 8/2018 after plane ride  GERD (gastroesophageal reflux disease)  History of cardioversion: 3/8/2019 atrial flutter  History of umbilical hernia repair  History of appendectomy  History of lithotripsy: 2/2019    Vital Signs Last 24 Hrs  T(C): 36.9 (02 Jul 2019 05:09), Max: 36.9 (02 Jul 2019 05:09)  T(F): 98.5 (02 Jul 2019 05:09), Max: 98.5 (02 Jul 2019 05:09)  HR: 135 (02 Jul 2019 07:47) (119 - 139)  BP: 108/78 (02 Jul 2019 07:47) (104/73 - 149/86)  RR: 18 (02 Jul 2019 07:47) (16 - 18)  SpO2: 96% (02 Jul 2019 07:47) (94% - 99%)    Physical Exam:  Constitutional: NAD, AAOx3  Cardiovascular: +S1S2 RRR  Pulmonary: CTA b/l, unlabored  GI: soft NTND +BS  Extremities: no pedal edema,   Neuro: non focal, CARMICHAEL x4    LABS:                        13.3   8.10  )-----------( 188      ( 01 Jul 2019 06:54 )             40.2     141  |  105  |  16.0  ----------------------------<  80  3.9   |  26.0  |  0.86    Ca    8.9      02 Jul 2019 06:33  Mg     2.1     07-02    A/P  58 year old male patient with a history of HTN, GERD who is POD #4 s/p atrial flutter and atrial fibrillation ablation admitted with AF RVR and b/l PNA. Patient failed DCCV x 2 yesterday as well as chemical cardioversion with Ibutilide.     - Resume Carafate 1G PO BID as per usual esophagus prophylaxis post AF ablation  - Start Amiodarone 400mg PO TID x 7 days, followed by 400mg PO BID x 7 days, followed by 200mg daily till follow up  - Pulmonary consult pending  - If rates more controlled or if patient cardioverts there is a chance he may be discharged today. Otherwise continue telemetry monitoring for rapid atrial flutter vs atrial tachycardia Patient seen today sitting up in bed. He offers no overnight complaints. He is eager to go home.     TELE: Atrial Flutter vs Atrial Tachycardia at 136bpm.     MEDICATIONS  (STANDING):  amiodarone    Tablet 400 milliGRAM(s) Oral every 8 hours  apixaban 5 milliGRAM(s) Oral every 12 hours  azithromycin   Tablet 500 milliGRAM(s) Oral daily  cefTRIAXone Injectable. 1000 milliGRAM(s) IV Push every 24 hours  metoprolol tartrate 25 milliGRAM(s) Oral every 6 hours  pantoprazole    Tablet 40 milliGRAM(s) Oral two times a day  saccharomyces boulardii 250 milliGRAM(s) Oral two times a day  sucralfate suspension 1 Gram(s) Oral two times a day    MEDICATIONS  (PRN):    Allergies  No Known Allergies    PAST MEDICAL & SURGICAL HISTORY:  Atrial fibrillation and flutter  Renal stones  DVT (deep venous thrombosis): 8/2018 after plane ride  GERD (gastroesophageal reflux disease)  History of cardioversion: 3/8/2019 atrial flutter  History of umbilical hernia repair  History of appendectomy  History of lithotripsy: 2/2019    Vital Signs Last 24 Hrs  T(C): 36.9 (02 Jul 2019 05:09), Max: 36.9 (02 Jul 2019 05:09)  T(F): 98.5 (02 Jul 2019 05:09), Max: 98.5 (02 Jul 2019 05:09)  HR: 135 (02 Jul 2019 07:47) (119 - 139)  BP: 108/78 (02 Jul 2019 07:47) (104/73 - 149/86)  RR: 18 (02 Jul 2019 07:47) (16 - 18)  SpO2: 96% (02 Jul 2019 07:47) (94% - 99%)    Physical Exam:  Constitutional: NAD, AAOx3  Cardiovascular: +S1S2 RRR  Pulmonary: CTA b/l, unlabored  GI: soft NTND +BS  Extremities: no pedal edema,   Neuro: non focal, CARMICHAEL x4    LABS:                        13.3   8.10  )-----------( 188      ( 01 Jul 2019 06:54 )             40.2     141  |  105  |  16.0  ----------------------------<  80  3.9   |  26.0  |  0.86    Ca    8.9      02 Jul 2019 06:33  Mg     2.1     07-02

## 2019-07-02 NOTE — CONSULT NOTE ADULT - SUBJECTIVE AND OBJECTIVE BOX
PULMONARY CONSULT NOTE      MELANIA NICKERSON  MRN-[id number]    Patient is a 58y old  Male who presents with a chief complaint of Afib (02 Jul 2019 09:18)      HISTORY OF PRESENT ILLNESS:    57 y/o male with s/p cardiac ablation for a flutter 3 days PTA and has a h/o DVT on Eliquis who came to the ED complaining of chest pain radiating to the back x 1 day. Patient had ablation done 3 days ago, said he was fine after the procedure but on Friday started having chest pain that felt like muscle pull radiating to the back worsen with respiration; associated with dry cough.  He called Dr. Aaron today who recommended to come to the ED. He has no fever, chills, palpitation, diaphoresis, nausea/vomiting, abdominal pain, weakness. He now feels much better with resolution of symptoms. He denies any h/o asthma, COPD, or smoking hx.      MEDICATIONS  (STANDING):  amiodarone    Tablet 400 milliGRAM(s) Oral every 8 hours  apixaban 5 milliGRAM(s) Oral every 12 hours  azithromycin   Tablet 500 milliGRAM(s) Oral daily  cefTRIAXone Injectable. 1000 milliGRAM(s) IV Push every 24 hours  metoprolol tartrate 25 milliGRAM(s) Oral every 6 hours  pantoprazole    Tablet 40 milliGRAM(s) Oral two times a day  saccharomyces boulardii 250 milliGRAM(s) Oral two times a day  sucralfate suspension 1 Gram(s) Oral two times a day      MEDICATIONS  (PRN):      Allergies    No Known Allergies    Intolerances        PAST MEDICAL & SURGICAL HISTORY:  Atrial fibrillation and flutter  Renal stones  DVT (deep venous thrombosis): 8/2018 after plane ride  GERD (gastroesophageal reflux disease)  History of cardioversion: 3/8/2019 atrial flutter  History of umbilical hernia repair  History of appendectomy  History of lithotripsy: 2/2019      FAMILY HISTORY:  Family history of lung cancer  Family history of COPD (chronic obstructive pulmonary disease)      SOCIAL HISTORY  Smoking History: denies    REVIEW OF SYSTEMS:    CONSTITUTIONAL:  No fevers, chills, sweats    HEENT:  Eyes:  No diplopia or blurred vision. ENT:  No earache, sore throat or runny nose.    CARDIOVASCULAR:  No pressure, squeezing, tightness, or heaviness about the chest; no palpitations.    RESPIRATORY:  Per HPI    GASTROINTESTINAL:  No abdominal pain, nausea, vomiting or diarrhea.    GENITOURINARY:  No dysuria, frequency or urgency.    NEUROLOGIC:  No paresthesias, fasciculations, seizures or weakness.    PSYCHIATRIC:  No disorder of thought or mood.    Vital Signs Last 24 Hrs  T(C): 36.8 (02 Jul 2019 09:47), Max: 36.9 (02 Jul 2019 05:09)  T(F): 98.2 (02 Jul 2019 09:47), Max: 98.5 (02 Jul 2019 05:09)  HR: 133 (02 Jul 2019 12:33) (119 - 139)  BP: 103/78 (02 Jul 2019 12:33) (103/78 - 149/86)  BP(mean): --  RR: 18 (02 Jul 2019 09:47) (16 - 18)  SpO2: 98% (02 Jul 2019 09:47) (94% - 98%)    PHYSICAL EXAMINATION:    GENERAL: The patient is a well-developed, well-nourished male in no apparent distress.     HEENT: Head is normocephalic and atraumatic. Extraocular muscles are intact. Mucous membranes are moist.     NECK: Supple.     LUNGS: Clear to auscultation without wheezing, rales, or rhonchi. Respirations unlabored    HEART: Tachy cardia without murmur.    ABDOMEN: Soft, nontender, and nondistended.  No hepatosplenomegaly is noted.    EXTREMITIES: Without any cyanosis, clubbing, rash, lesions or edema.    NEUROLOGIC: Grossly intact.      LABS:                        13.3   8.10  )-----------( 188      ( 01 Jul 2019 06:54 )             40.2     07-02    141  |  105  |  16.0  ----------------------------<  80  3.9   |  26.0  |  0.86    Ca    8.9      02 Jul 2019 06:33  Mg     2.1     07-02        RADIOLOGY & ADDITIONAL STUDIES:       EXAM:  CT ANGIO CHEST (W)AW IC                          PROCEDURE DATE:  06/29/2019          INTERPRETATION:  CTA chest .  COMPARISON: None.  CLINICAL INFORMATION: Back pain post post cardiac procedure. Assess for   pulmonary embolus or pericardial rupture..  TECHNIQUE: Contiguous axial 1.25 mm slice thickness images of the chest   were obtained after intravenous contrast administration utilizing PE   protocol.  Maximum intensity projection,(MIP) 3-D,  imaging was created and   interpreted.  100 mls of Omnipaque 300 was administered intravenously without   complication and 0 mls were discarded.    FINDINGS:  There are no pulmonary arterial filling defects to suggest pulmonary   embolism.    The mediastinum great vessels are normal.     There are no mediastinal masses or lymphadenopathy.     There is mild cardiomegaly. LEFT atrial enlargement noted. No intra   atrial thrombus or defect seen. No evidence of cardiac rupture. No   pericardial effusion.  The airway is patent showing normal caliber and contour.  LEFT hemiazygous vein shows reflux of injected contrast to the upper   abdomen. There are posterior basilar dependent airspace consolidations   are without pleural effusion.   There is no pleural effusion or pneumothorax.    Visualized upper abdominal viscera unremarkable.    The bones are normal.    IMPRESSION:  Bibasilar posterior sulcus symmetric airspace consolidations.   No evidence of pulmonary embolism.  Cardiomegaly.   LEFT atrial enlargement.        JESUS ALBERTO GREY M.D., ATTENDING RADIOLOGIST  This document has been electronically signed. Jun 29 2019  4:14PM           EXAM:  XR CHEST PORTABLE IMMED 1V                          PROCEDURE DATE:  06/29/2019          INTERPRETATION:  CLINICAL INFORMATION: Chest pain.    TECHNIQUE: Frontal view of the chest   COMPARISON: March 7, 2019.    FINDINGS:    LUNGS/PLEURA: Small left pleural effusion with basilar atelectasis or   consolidation. No pneumothorax.  MEDIASTINUM: Cardiomediastinal silhouette is unremarkable.  OTHER: None.    IMPRESSION:     Small left pleural effusion with basilar atelectasis or consolidation.       JOSE ALEJANDRO TREVIÑO M.D., ATTENDING RADIOLOGIST  This document has been electronically signed. Jun 29 2019  3:28PM          ECHO:      Summary:   1. Left ventricular ejection fraction, by visual estimation, is 50 to   55%.   2. There is no evidence of pericardial effusion.   3. Mild mitral valve regurgitation.   4. Dilatation of the aortic root.   5. Color Doppler demonstrates the presence of a very small left to right   shunt at the Atrial level.    Vin Snyder MD Electronically signed on 7/1/2019 at 5:27:36 PM

## 2019-07-02 NOTE — PROGRESS NOTE ADULT - ASSESSMENT
57 y/o male with s/p cardiac ablation for a flutter 3 days ago, h/o DVT on Eliquis came to the ED complaining of chest pain radiating to the back x 1 day. Patient had ablation done 3 days ago, said he was fine after the procedure but on Friday started having chest pain that felt like muscle pull radiating to the back worsen with respiration; associated with dry cough.  He called Dr. Aaron today who recommended to come to the ED. EKG in the ER consistent with Afib with rvr. CTA of the chest was negative. Troponin elevated to 0.54 repeat of 0.2 with BNp elevated in the 700s. Evaluated by cardiology recommended rate control with lopressor and repeat Echocardiogram. CTA of the chest showed evidence of bibasilar infiltrates; started on IV rocephin for pneumonia.    Assessment/Plan:    1. Afib with RVR status post recent ablation for a flutter 3d prior to admit: tachycardia persistent  complicated by nstemi, tachy induced w/ poss elevation also from recent ablation procedure, peak trop 0.54  Continue telemetry monitoring  PO metoprolol 25mg Q6 hours as recommended by cardiology   echocardiogram report, ef 50-55%, small left to right shunt, aortic root dilatation, severity not quantified  ss Cardiology following, sp dccv 7/1, well tolerated, uncomplicated however unsuccessful  on doac for ablation procedure, to be continued post cardioversion, usually for 3wks, final duration per cardio/ep  started on amio load for rate control    2. Bibasilar pneumonia: stable.   noted on ct, pt minimally symptomatic  On rocephin + zithro, 5d course, last day 7/3  Florastor  cardio request pulm cs, eval appreciated, repeat ct chest as outpt, if persistent abn noted despite completed tx for pna plan for PET/CT for additional investigation    3. History of RT Gastrocnemius DVTon Eliquis BID  dx 09/2018, already completed 3mo treatment, and eliquis stopped  resolution confirmed outpt on us ordered by his vasc sx Dr Villagran    DVT prophylaxis: Eliquis     dispo: likely dc home in 1-2 days     outpt fu. pmd, cardio, ep

## 2019-07-02 NOTE — PROGRESS NOTE ADULT - ASSESSMENT
58 year old male patient with a history of HTN, GERD who is POD #4 s/p atrial flutter and atrial fibrillation ablation admitted with AF RVR and b/l PNA. Patient failed DCCV x 2 yesterday as well as chemical cardioversion with Ibutilide.     - Resume Carafate 1G PO BID as per usual esophagus prophylaxis post AF ablation  - Start Amiodarone 400mg PO TID x 7 days, followed by 400mg PO BID x 7 days, followed by 200mg daily till follow up  - Pulmonary consult pending  - If rates more controlled or if patient cardioverts there is a chance he may be discharged today. Otherwise continue telemetry monitoring for rapid atrial flutter vs atrial tachycardia

## 2019-07-02 NOTE — CONSULT NOTE ADULT - ASSESSMENT
L>R LL atelectasis vs consolidation which may be the result of poor inspiration during procedure PTA or from bibasilar pneumonia though clinically pt currently appears well  Back pain may be related to the consolidation and possible adjacent pleural involvement though pain now appears to be resolved  Pt with AF s/p ablation  Pt denies smoking hx, COPD, or asthma    Rec:    Complete abx  Rate control of AF; pt is tachycardic  Optimize cardiac function  On A/c for AF  On PPI for GI prophylaxis  Drug nebs as needed  Off O2  F/u chest CT as outpt but if findings persist, may need PET/CT and bx or at least inspection  Pulm f/u after d/c    D/w medicine
58 year old male with history of GERD, DVT 8/2018 after 5 hour flight, atrial flutter s/p DCCV 3/8/19 with restoration of sinus rhythm. He is now s/p successful ablation for typical right atrial flutter; ablation along the cavotricuspid isthmus was performed and bidirectional block across the CTI was confirmed; successful pulmonary vein isolation of all four veins. Patient now comes with back and chest pain. Patient is in atrial fibrillation with RVR. Patients chest X ray and Chest CT as noted above    1. Recurrent PAF with RVR  2. Chest pain/Back pain  3. ?Pneumonia    Recommendations:  1. Continue Eliquis  2. May use Lopressor 25 mg Q6 h for rate control  3. ECHO  4. Management of noncardiac issues as per ED and Hospitalist team    Will follow.    Discussed with Dr Shante Castro

## 2019-07-02 NOTE — PROGRESS NOTE ADULT - SUBJECTIVE AND OBJECTIVE BOX
CC: rvr    Patient seen and examined at the bedside. No acute overnight events. sp dccv however unsuccessful yesterday. Complaining of nothing today. Denies fever/chills, headache, lightheadedness, dizziness, chest pain, palpitations, shortness of breath,  abd pain, nausea/vomiting/diarrhea, muscle pain.      =========================================================================================    PHYSICAL EXAM.    GEN - appears age appropriate. well nourished. pleasant. no distress.   HEENT - NCAT, EOMI, LILY  RESP - CTA BL, no wheeze/stridor/rhonchi/crackles. not on supplemental O2. able to speak in full sentences without distress.   CARDIO - irregularly irregular, tachycardic. No murmurs/rubs/gallops.  ABD - Soft/Non tender/Non distended. Normal BS x4 quadrants. no guarding/rebound tenderness.  Ext - No MORIAH.  MSK - BL 5/5 strength on upper and lower extremities.   Neuro - AAOx3. cn 2-12 grossly intact  Psych - normal affect  Skin - c/d/i. no rashes/lesions      VITAL SIGNS.    Vital Signs Last 24 Hrs  T(C): 36.7 (03 Jul 2019 05:38), Max: 36.8 (02 Jul 2019 09:47)  T(F): 98 (03 Jul 2019 05:38), Max: 98.2 (02 Jul 2019 09:47)  HR: 132 (03 Jul 2019 05:38) (130 - 136)  BP: 88/68 (03 Jul 2019 05:40) (87/70 - 108/78)  BP(mean): --  RR: 16 (03 Jul 2019 05:38) (16 - 18)  SpO2: 98% (03 Jul 2019 05:38) (95% - 98%)        =================================================    LABS.                          13.3   8.10  )-----------( 188      ( 01 Jul 2019 06:54 )             40.2     07-02    141  |  105  |  16.0  ----------------------------<  80  3.9   |  26.0  |  0.86    Ca    8.9      02 Jul 2019 06:33  Mg     2.1     07-02          I&O's Summary    01 Jul 2019 07:01  -  02 Jul 2019 07:00  --------------------------------------------------------  IN: 300 mL / OUT: 0 mL / NET: 300 mL    02 Jul 2019 07:01  -  03 Jul 2019 05:59  --------------------------------------------------------  IN: 860 mL / OUT: 1000 mL / NET: -140 mL          ================================================    IMAGING.      ================================================    HOME MEDS.    Home Medications:      ================================================    HOSPITAL MEDS.    MEDICATIONS  (STANDING):  amiodarone    Tablet 400 milliGRAM(s) Oral every 8 hours  apixaban 5 milliGRAM(s) Oral every 12 hours  azithromycin   Tablet 500 milliGRAM(s) Oral daily  metoprolol tartrate 25 milliGRAM(s) Oral every 6 hours  pantoprazole    Tablet 40 milliGRAM(s) Oral two times a day  saccharomyces boulardii 250 milliGRAM(s) Oral two times a day  sucralfate suspension 1 Gram(s) Oral two times a day    MEDICATIONS  (PRN):  ALBUTerol    0.083% 2.5 milliGRAM(s) Nebulizer every 6 hours PRN Shortness of Breath and/or Wheezing

## 2019-07-02 NOTE — PROGRESS NOTE ADULT - SUBJECTIVE AND OBJECTIVE BOX
Called by nurse regarding , reviewed note, as per cath Cath lab NP the patient had some improvement with PVC's and cleared for floor.  Patient has been stable, but heart rate has increased to 140's ST, no PVC"s short of irregularity possible afib, otherwise stable.   Patient was given midnight dose of 25 lopressor.  Patient is A and O x 3, skin warm and dry, rr wnl for rate and rhythm, color pink, no distress noted, denies chest pain, palpitations dizziness, n/v or any discomfort.  Will administer IV lopressor 2.5.  /72.    Tachycardia  Ct telemetry  Lopressor 2.5 IV  x 1  If tachy arrhythmia continues will call ICU consult.   Notify NP/MD with heart rate > 130

## 2019-07-03 LAB
ANION GAP SERPL CALC-SCNC: 10 MMOL/L — SIGNIFICANT CHANGE UP (ref 5–17)
BASOPHILS # BLD AUTO: 0.04 K/UL — SIGNIFICANT CHANGE UP (ref 0–0.2)
BASOPHILS NFR BLD AUTO: 0.6 % — SIGNIFICANT CHANGE UP (ref 0–2)
BUN SERPL-MCNC: 17 MG/DL — SIGNIFICANT CHANGE UP (ref 8–20)
CALCIUM SERPL-MCNC: 8.6 MG/DL — SIGNIFICANT CHANGE UP (ref 8.6–10.2)
CHLORIDE SERPL-SCNC: 105 MMOL/L — SIGNIFICANT CHANGE UP (ref 98–107)
CO2 SERPL-SCNC: 25 MMOL/L — SIGNIFICANT CHANGE UP (ref 22–29)
CREAT SERPL-MCNC: 1.03 MG/DL — SIGNIFICANT CHANGE UP (ref 0.5–1.3)
EOSINOPHIL # BLD AUTO: 0.12 K/UL — SIGNIFICANT CHANGE UP (ref 0–0.5)
EOSINOPHIL NFR BLD AUTO: 1.8 % — SIGNIFICANT CHANGE UP (ref 0–6)
GLUCOSE SERPL-MCNC: 105 MG/DL — SIGNIFICANT CHANGE UP (ref 70–115)
HCT VFR BLD CALC: 40.4 % — SIGNIFICANT CHANGE UP (ref 39–50)
HGB BLD-MCNC: 13.3 G/DL — SIGNIFICANT CHANGE UP (ref 13–17)
IMM GRANULOCYTES NFR BLD AUTO: 0.4 % — SIGNIFICANT CHANGE UP (ref 0–1.5)
LYMPHOCYTES # BLD AUTO: 1.95 K/UL — SIGNIFICANT CHANGE UP (ref 1–3.3)
LYMPHOCYTES # BLD AUTO: 28.7 % — SIGNIFICANT CHANGE UP (ref 13–44)
MAGNESIUM SERPL-MCNC: 1.9 MG/DL — SIGNIFICANT CHANGE UP (ref 1.6–2.6)
MCHC RBC-ENTMCNC: 29.1 PG — SIGNIFICANT CHANGE UP (ref 27–34)
MCHC RBC-ENTMCNC: 32.9 GM/DL — SIGNIFICANT CHANGE UP (ref 32–36)
MCV RBC AUTO: 88.4 FL — SIGNIFICANT CHANGE UP (ref 80–100)
MONOCYTES # BLD AUTO: 0.72 K/UL — SIGNIFICANT CHANGE UP (ref 0–0.9)
MONOCYTES NFR BLD AUTO: 10.6 % — SIGNIFICANT CHANGE UP (ref 2–14)
NEUTROPHILS # BLD AUTO: 3.93 K/UL — SIGNIFICANT CHANGE UP (ref 1.8–7.4)
NEUTROPHILS NFR BLD AUTO: 57.9 % — SIGNIFICANT CHANGE UP (ref 43–77)
PLATELET # BLD AUTO: 239 K/UL — SIGNIFICANT CHANGE UP (ref 150–400)
POTASSIUM SERPL-MCNC: 3.8 MMOL/L — SIGNIFICANT CHANGE UP (ref 3.5–5.3)
POTASSIUM SERPL-SCNC: 3.8 MMOL/L — SIGNIFICANT CHANGE UP (ref 3.5–5.3)
RBC # BLD: 4.57 M/UL — SIGNIFICANT CHANGE UP (ref 4.2–5.8)
RBC # FLD: 13 % — SIGNIFICANT CHANGE UP (ref 10.3–14.5)
SODIUM SERPL-SCNC: 140 MMOL/L — SIGNIFICANT CHANGE UP (ref 135–145)
WBC # BLD: 6.79 K/UL — SIGNIFICANT CHANGE UP (ref 3.8–10.5)
WBC # FLD AUTO: 6.79 K/UL — SIGNIFICANT CHANGE UP (ref 3.8–10.5)

## 2019-07-03 PROCEDURE — 99232 SBSQ HOSP IP/OBS MODERATE 35: CPT

## 2019-07-03 PROCEDURE — 99232 SBSQ HOSP IP/OBS MODERATE 35: CPT | Mod: 25

## 2019-07-03 PROCEDURE — 92960 CARDIOVERSION ELECTRIC EXT: CPT

## 2019-07-03 PROCEDURE — 93010 ELECTROCARDIOGRAM REPORT: CPT | Mod: 77

## 2019-07-03 PROCEDURE — 93010 ELECTROCARDIOGRAM REPORT: CPT

## 2019-07-03 RX ORDER — AMIODARONE HYDROCHLORIDE 400 MG/1
400 TABLET ORAL
Refills: 0 | Status: DISCONTINUED | OUTPATIENT
Start: 2019-07-03 | End: 2019-07-04

## 2019-07-03 RX ORDER — AMIODARONE HYDROCHLORIDE 400 MG/1
150 TABLET ORAL ONCE
Refills: 0 | Status: COMPLETED | OUTPATIENT
Start: 2019-07-03 | End: 2019-07-03

## 2019-07-03 RX ORDER — APIXABAN 2.5 MG/1
1 TABLET, FILM COATED ORAL
Qty: 60 | Refills: 0
Start: 2019-07-03 | End: 2019-08-01

## 2019-07-03 RX ORDER — SUCRALFATE 1 G
10 TABLET ORAL
Qty: 600 | Refills: 0
Start: 2019-07-03 | End: 2019-08-01

## 2019-07-03 RX ORDER — AMIODARONE HYDROCHLORIDE 400 MG/1
2 TABLET ORAL
Qty: 120 | Refills: 0
Start: 2019-07-03 | End: 2019-08-01

## 2019-07-03 RX ORDER — METOPROLOL TARTRATE 50 MG
1 TABLET ORAL
Qty: 0 | Refills: 0 | DISCHARGE
Start: 2019-07-03

## 2019-07-03 RX ADMIN — AMIODARONE HYDROCHLORIDE 618 MILLIGRAM(S): 400 TABLET ORAL at 17:19

## 2019-07-03 RX ADMIN — AMIODARONE HYDROCHLORIDE 400 MILLIGRAM(S): 400 TABLET ORAL at 06:07

## 2019-07-03 RX ADMIN — CEFTRIAXONE 1000 MILLIGRAM(S): 500 INJECTION, POWDER, FOR SOLUTION INTRAMUSCULAR; INTRAVENOUS at 05:47

## 2019-07-03 RX ADMIN — PANTOPRAZOLE SODIUM 40 MILLIGRAM(S): 20 TABLET, DELAYED RELEASE ORAL at 20:02

## 2019-07-03 RX ADMIN — Medication 25 MILLIGRAM(S): at 13:39

## 2019-07-03 RX ADMIN — Medication 1 GRAM(S): at 05:47

## 2019-07-03 RX ADMIN — Medication 250 MILLIGRAM(S): at 20:44

## 2019-07-03 RX ADMIN — Medication 250 MILLIGRAM(S): at 05:47

## 2019-07-03 RX ADMIN — APIXABAN 5 MILLIGRAM(S): 2.5 TABLET, FILM COATED ORAL at 05:47

## 2019-07-03 RX ADMIN — Medication 1 GRAM(S): at 20:03

## 2019-07-03 RX ADMIN — Medication 25 MILLIGRAM(S): at 08:29

## 2019-07-03 RX ADMIN — Medication 25 MILLIGRAM(S): at 20:03

## 2019-07-03 RX ADMIN — AMIODARONE HYDROCHLORIDE 400 MILLIGRAM(S): 400 TABLET ORAL at 13:39

## 2019-07-03 RX ADMIN — AZITHROMYCIN 500 MILLIGRAM(S): 500 TABLET, FILM COATED ORAL at 13:39

## 2019-07-03 RX ADMIN — APIXABAN 5 MILLIGRAM(S): 2.5 TABLET, FILM COATED ORAL at 20:03

## 2019-07-03 RX ADMIN — PANTOPRAZOLE SODIUM 40 MILLIGRAM(S): 20 TABLET, DELAYED RELEASE ORAL at 05:47

## 2019-07-03 NOTE — PROGRESS NOTE ADULT - ASSESSMENT
Assess    L>R LL atelectasis vs consolidation which may be the result of poor inspiration during procedure PTA or from bibasilar pneumonia though clinically pt currently appears well  Back pain may be related to the consolidation and possible adjacent pleural involvement though pain now appears to be resolved  Pt with AF s/p ablation  Pt denies smoking hx, COPD, or asthma  JENNIFER not fully excluded - severe JENNIFER not suspect    Rec:    Complete abx - can be po  Rate control of AF; pt is tachycardic  Optimize cardiac function  On A/c for AF  On PPI for GI prophylaxis  Drug nebs as needed - can DC on discharge  Off O2  F/u chest CT as outpt but if findings persist, may need PET/CT and bx or at least inspection  Pulm f/u after d/c  Consider PSG if rate control/ afib remain an issue  No objection to discharge when OK with cardiology

## 2019-07-03 NOTE — PROGRESS NOTE ADULT - SUBJECTIVE AND OBJECTIVE BOX
CC: rvr    Patient seen and examined at the bedside. No acute overnight events. sp dccv however unsuccessful yesterday. Complaining of nothing today. Denies fever/chills, headache, lightheadedness, dizziness, chest pain, palpitations, shortness of breath,  abd pain, nausea/vomiting/diarrhea, muscle pain.      =========================================================================================    PHYSICAL EXAM.    GEN - appears age appropriate. well nourished. pleasant. no distress.   HEENT - NCAT, EOMI, LILY  RESP - CTA BL, no wheeze/stridor/rhonchi/crackles. not on supplemental O2. able to speak in full sentences without distress.   CARDIO - irregularly irregular, tachycardic. No murmurs/rubs/gallops.  ABD - Soft/Non tender/Non distended. Normal BS x4 quadrants. no guarding/rebound tenderness.  Ext - No MORIAH.  MSK - BL 5/5 strength on upper and lower extremities.   Neuro - AAOx3. cn 2-12 grossly intact  Psych - normal affect  Skin - c/d/i. no rashes/lesions      VITAL SIGNS.    Vital Signs Last 24 Hrs  T(C): 36.5 (03 Jul 2019 09:47), Max: 36.7 (03 Jul 2019 05:38)  T(F): 97.7 (03 Jul 2019 09:47), Max: 98 (03 Jul 2019 05:38)  HR: 53 (03 Jul 2019 09:47) (53 - 136)  BP: 117/70 (03 Jul 2019 09:47) (87/70 - 117/70)  BP(mean): --  RR: 18 (03 Jul 2019 09:47) (16 - 18)  SpO2: 98% (03 Jul 2019 09:47) (95% - 98%)        =================================================    LABS.                          13.3   6.79  )-----------( 239      ( 03 Jul 2019 05:50 )             40.4     07-03    140  |  105  |  17.0  ----------------------------<  105  3.8   |  25.0  |  1.03    Ca    8.6      03 Jul 2019 05:50  Mg     1.9     07-03          I&O's Summary    02 Jul 2019 07:01  -  03 Jul 2019 07:00  --------------------------------------------------------  IN: 1200 mL / OUT: 1000 mL / NET: 200 mL    03 Jul 2019 07:01  -  03 Jul 2019 12:48  --------------------------------------------------------  IN: 260 mL / OUT: 350 mL / NET: -90 mL          ================================================    IMAGING.      ================================================    HOME MEDS.    Home Medications:      ================================================    HOSPITAL MEDS.    MEDICATIONS  (STANDING):  amiodarone    Tablet 400 milliGRAM(s) Oral every 8 hours  apixaban 5 milliGRAM(s) Oral every 12 hours  azithromycin   Tablet 500 milliGRAM(s) Oral daily  metoprolol tartrate 25 milliGRAM(s) Oral every 6 hours  pantoprazole    Tablet 40 milliGRAM(s) Oral two times a day  saccharomyces boulardii 250 milliGRAM(s) Oral two times a day  sucralfate suspension 1 Gram(s) Oral two times a day    MEDICATIONS  (PRN):  ALBUTerol    0.083% 2.5 milliGRAM(s) Nebulizer every 6 hours PRN Shortness of Breath and/or Wheezing

## 2019-07-03 NOTE — PROGRESS NOTE ADULT - SUBJECTIVE AND OBJECTIVE BOX
Pt seen and examined. He is very anxious to go home. Wife bedside.  He denies chest pain, palpitation, , SHIRLEY lightheadedness or dizziness.  Started on po amiodarone yesterday.    Tele: atypical atrial flutter 120bpm at rest    MEDICATIONS  (STANDING):  amiodarone    Tablet 400 milliGRAM(s) Oral every 8 hours  apixaban 5 milliGRAM(s) Oral every 12 hours  azithromycin   Tablet 500 milliGRAM(s) Oral daily  metoprolol tartrate 25 milliGRAM(s) Oral every 6 hours  pantoprazole    Tablet 40 milliGRAM(s) Oral two times a day  saccharomyces boulardii 250 milliGRAM(s) Oral two times a day  sucralfate suspension 1 Gram(s) Oral two times a day    MEDICATIONS  (PRN):  ALBUTerol    0.083% 2.5 milliGRAM(s) Nebulizer every 6 hours PRN Shortness of Breath and/or Wheezing      Allergies  No Known Allergies    PAST MEDICAL & SURGICAL HISTORY:  Atrial fibrillation and flutter  Renal stones  DVT (deep venous thrombosis): 2018 after plane ride  GERD (gastroesophageal reflux disease)  History of cardioversion: 3/8/2019 atrial flutter  History of umbilical hernia repair  History of appendectomy  History of lithotripsy: 2019      Vital Signs Last 24 Hrs  T(C): 36.5 (2019 09:47), Max: 36.7 (2019 05:38)  T(F): 97.7 (2019 09:47), Max: 98 (2019 05:38)  HR: 53 (2019 09:47) (53 - 136)  BP: 117/70 (2019 09:47) (87/70 - 117/70)  RR: 18 (2019 09:47) (16 - 18)  SpO2: 98% (2019 09:47) (95% - 98%)    Physical Exam:  Constitutional: NAD, AAOx3  Cardiovascular: +S1S2 tachycardic  Pulmonary: CTA b/l, unlabored  GI: soft NTND +BS  Extremities: no pedal edema, +distal pulses b/l  Neuro: non focal, CARMICHAEL x4    LABS:                        13.3   6.79  )-----------( 239      ( 2019 05:50 )             40.4     07-03    140  |  105  |  17.0  ----------------------------<  105  3.8   |  25.0  |  1.03    Ca    8.6      2019 05:50  Mg     1.9     07-    TTE 19: EF 50-55%, no pericardial effusion, mild MR     A/P: 58 year old male with history of GERD, DVT 2018 after 5 hour flight, atrial flutter s/p DCCV 3/8/19 with restoration of sinus rhythm s/p recent RFA of AF/Flutter w/PVI + CTI 19 who presented with pleuritic chest pain and AF with RVR 19. No AEF or pericarditis detected- CT scan c/w bibasilar PNA.  Patient failed DCCV x 2, as well as chemical cardioversion with Ibutilide 19 and was initiated on amiodarone 19. Heart rates still 120-140bpm    - keep npo for IV amio bolus and reattempt at DCCV today. Continue po amiodarone and uninterrupted eliquis  -Continue Carafate and protonix post AF ablation  -consider decreasing amiodarone and metoprolol post DCCV  -Pulmonary consult appreciated- recommend outpt f/u  for: L>R LL atelectasis vs consolidation which may be the result of poor inspiration during procedure or from bibasilar pneumonia though clinically pt currently appears well. F/u chest CT as outpt but if findings persist, may need PET/CT and bx or at least inspection    will amandeep Aaron Pt seen and examined. He is very anxious to go home. Wife bedside.  He denies chest pain, palpitation, , SHIRLEY lightheadedness or dizziness.  Started on po amiodarone yesterday.    Tele: atypical atrial flutter 120bpm at rest    MEDICATIONS  (STANDING):  amiodarone    Tablet 400 milliGRAM(s) Oral every 8 hours  apixaban 5 milliGRAM(s) Oral every 12 hours  azithromycin   Tablet 500 milliGRAM(s) Oral daily  metoprolol tartrate 25 milliGRAM(s) Oral every 6 hours  pantoprazole    Tablet 40 milliGRAM(s) Oral two times a day  saccharomyces boulardii 250 milliGRAM(s) Oral two times a day  sucralfate suspension 1 Gram(s) Oral two times a day    MEDICATIONS  (PRN):  ALBUTerol    0.083% 2.5 milliGRAM(s) Nebulizer every 6 hours PRN Shortness of Breath and/or Wheezing      Allergies  No Known Allergies    PAST MEDICAL & SURGICAL HISTORY:  Atrial fibrillation and flutter  Renal stones  DVT (deep venous thrombosis): 2018 after plane ride  GERD (gastroesophageal reflux disease)  History of cardioversion: 3/8/2019 atrial flutter  History of umbilical hernia repair  History of appendectomy  History of lithotripsy: 2019      Vital Signs Last 24 Hrs  T(C): 36.5 (2019 09:47), Max: 36.7 (2019 05:38)  T(F): 97.7 (2019 09:47), Max: 98 (2019 05:38)  HR: 53 (2019 09:47) (53 - 136)  BP: 117/70 (2019 09:47) (87/70 - 117/70)  RR: 18 (2019 09:47) (16 - 18)  SpO2: 98% (2019 09:47) (95% - 98%)    Physical Exam:  Constitutional: NAD, AAOx3  Cardiovascular: +S1S2 tachycardic  Pulmonary: CTA b/l, unlabored  GI: soft NTND +BS  Extremities: no pedal edema, +distal pulses b/l  Neuro: non focal, CARMICHAEL x4    LABS:                        13.3   6.79  )-----------( 239      ( 2019 05:50 )             40.4     07-03    140  |  105  |  17.0  ----------------------------<  105  3.8   |  25.0  |  1.03    Ca    8.6      2019 05:50  Mg     1.9     07-03    TTE 19: EF 50-55%, no pericardial effusion, mild MR     A/P: 59 yo male with pmhx GERD, DVT 2018, atrial flutter s/p MARINA/DCCV 3/8/19,  persistent atrial fibrillation who previously failed multaq s/p recent elective atrial flutter and atrial fibrillation (19-Dym) maintained on eliquis. Pt was discharged in sinus rhythm 19. Pt presented to ER 19 with c/o of back and chest pain. He was admitted with AF RVR and an incidental finding of b/l PNA on CT (CT was negative for atrial-esophageal fistula, TTE neg for effusion).  Patient failed DCCV x 2, as well as chemical cardioversion with Ibutilide 19 and was initiated on amiodarone 19. Heart rates still 120-140bpm    - keep npo for IV amio bolus and reattempt at DCCV today. Continue po amiodarone and uninterrupted eliquis  -Continue Carafate and protonix post AF ablation  -consider decreasing amiodarone and metoprolol post DCCV  -Pulmonary consult appreciated- recommend outpt f/u  for: L>R LL atelectasis vs consolidation which may be the result of poor inspiration during procedure or from bibasilar pneumonia though clinically pt currently appears well. F/u chest CT as outpt but if findings persist, may need PET/CT and bx or at least inspection    will amandeep Aaron Pt seen and examined. He is very anxious to go home. Wife bedside.  He denies chest pain, palpitation, , SHIRLEY lightheadedness or dizziness.  Started on po amiodarone yesterday.    Tele: atypical atrial flutter 120bpm at rest    MEDICATIONS  (STANDING):  amiodarone    Tablet 400 milliGRAM(s) Oral every 8 hours  apixaban 5 milliGRAM(s) Oral every 12 hours  azithromycin   Tablet 500 milliGRAM(s) Oral daily  metoprolol tartrate 25 milliGRAM(s) Oral every 6 hours  pantoprazole    Tablet 40 milliGRAM(s) Oral two times a day  saccharomyces boulardii 250 milliGRAM(s) Oral two times a day  sucralfate suspension 1 Gram(s) Oral two times a day    MEDICATIONS  (PRN):  ALBUTerol    0.083% 2.5 milliGRAM(s) Nebulizer every 6 hours PRN Shortness of Breath and/or Wheezing      Allergies  No Known Allergies    PAST MEDICAL & SURGICAL HISTORY:  Atrial fibrillation and flutter  Renal stones  DVT (deep venous thrombosis): 2018 after plane ride  GERD (gastroesophageal reflux disease)  History of cardioversion: 3/8/2019 atrial flutter  History of umbilical hernia repair  History of appendectomy  History of lithotripsy: 2019      Vital Signs Last 24 Hrs  T(C): 36.5 (2019 09:47), Max: 36.7 (2019 05:38)  T(F): 97.7 (2019 09:47), Max: 98 (2019 05:38)  HR: 53 (2019 09:47) (53 - 136)  BP: 117/70 (2019 09:47) (87/70 - 117/70)  RR: 18 (2019 09:47) (16 - 18)  SpO2: 98% (2019 09:47) (95% - 98%)    Physical Exam:  Constitutional: NAD, AAOx3  Cardiovascular: +S1S2 tachycardic  Pulmonary: CTA b/l, unlabored  GI: soft NTND +BS  Extremities: no pedal edema, +distal pulses b/l  Neuro: non focal, CARMICHAEL x4    LABS:                        13.3   6.79  )-----------( 239      ( 2019 05:50 )             40.4     07-03    140  |  105  |  17.0  ----------------------------<  105  3.8   |  25.0  |  1.03    Ca    8.6      2019 05:50  Mg     1.9         TTE 19: EF 50-55%, no pericardial effusion, mild MR

## 2019-07-03 NOTE — PROGRESS NOTE ADULT - SUBJECTIVE AND OBJECTIVE BOX
ELECTROPHYSIOLOGY BRIEF POST-OP NOTE    I have personally seen and examined the patient. I agree with the history and physical which I have reviewed and noted any changes below.      PRE-OP DIAGNOSIS: Atrial Flutter (atypical)    POST-OP DIAGNOSIS: SR    PROCEDURE: Sedation and DCCV    Physician: Dangelo Aaron MD  Assistant: None    ESTIMATED BLOOD LOSS: None    ANESTHESIA TYPE:  [  ]General Anesthesia  [ x ]Sedation  [  ]Local/Regional    CONDITION:  [  ]Critical  [  ]Serious  [ x ]Stable  [ x ]Good    FINDINGS: SR    EKG: pending    A/P: 59 yo male with pmhx GERD, DVT 8/2018, atrial flutter s/p MARINA/DCCV 3/8/19,  persistent atrial fibrillation who previously failed multaq s/p recent elective atrial flutter and atrial fibrillation (6/27/19-Dym) maintained on eliquis. Pt was discharged in sinus rhythm 6/28/19. Pt presented to ER 6/29/19 with c/o of back and chest pain. He was admitted with AF RVR and an incidental finding of b/l PNA on CT (CT was negative for atrial-esophageal fistula, TTE neg for effusion).  Patient failed DCCV x 2, as well as chemical cardioversion with Ibutilide 7/1/19 and was initiated on amiodarone 7/2/19. Heart rates still 120-140bpm. Patient once again underwent sedation and DCCV on 7/3/19. Currently in SR    - Continue uninterrupted anticoagulation  - Amiodarone 400mg PO BID x 10 days followed by 200mg PO QD till follow up with EP  - Consider decreasing beta blocker if patient bradycardic post cardioversion  - Continue Protonix and Carafate for esophagus protection post long and extensive ablation  - If stable discharge planning home tomorrow AM. ELECTROPHYSIOLOGY BRIEF POST-OP NOTE    I have personally seen and examined the patient. I agree with the history and physical which I have reviewed and noted any changes below.      PRE-OP DIAGNOSIS: Atrial Flutter (atypical)    POST-OP DIAGNOSIS: SR    PROCEDURE: Sedation and DCCV    Physician: Dangelo Aaron MD  Assistant: None    ESTIMATED BLOOD LOSS: None    ANESTHESIA TYPE:  [  ]General Anesthesia  [ x ]Sedation  [  ]Local/Regional    CONDITION:  [  ]Critical  [  ]Serious  [ x ]Stable  [ x ]Good    FINDINGS: SR    EKG: pending

## 2019-07-03 NOTE — PROGRESS NOTE ADULT - NSHPATTENDINGPLANDISCUSS_GEN_ALL_CORE
the patient.  All imaging and results of lab/other studies reviewed by me. All questions answered to their satisfaction. At this time they agree with the current plan of therapy.

## 2019-07-03 NOTE — PROGRESS NOTE ADULT - SUBJECTIVE AND OBJECTIVE BOX
PULMONARY Progress NOTE      MELANIA NICKERSON  MRN-[id number]    Patient is a 58y old  Male who presents with a chief complaint of Afib (02 Jul 2019 09:18)      HISTORY OF PRESENT ILLNESS:    57 y/o male with s/p cardiac ablation for a flutter 3 days PTA and has a h/o DVT on Eliquis who came to the ED complaining of chest pain radiating to the back x 1 day. Patient had ablation done 3 days ago, said he was fine after the procedure but on Friday started having chest pain that felt like muscle pull radiating to the back worsen with respiration; associated with dry cough.  He called Dr. Aaron today who recommended to come to the ED. He has no fever, chills, palpitation, diaphoresis, nausea/vomiting, abdominal pain, weakness. He now feels much better with resolution of symptoms. He denies any h/o asthma, COPD, or smoking hx.  Moderate snoring    Interval  OK  Rate control in progress  Anxious to go home    MEDICATIONS  (STANDING):  amiodarone    Tablet 400 milliGRAM(s) Oral every 8 hours  apixaban 5 milliGRAM(s) Oral every 12 hours  azithromycin   Tablet 500 milliGRAM(s) Oral daily  cefTRIAXone Injectable. 1000 milliGRAM(s) IV Push every 24 hours  metoprolol tartrate 25 milliGRAM(s) Oral every 6 hours  pantoprazole    Tablet 40 milliGRAM(s) Oral two times a day  saccharomyces boulardii 250 milliGRAM(s) Oral two times a day  sucralfate suspension 1 Gram(s) Oral two times a day      MEDICATIONS  (PRN):      Allergies    No Known Allergies    Intolerances        PAST MEDICAL & SURGICAL HISTORY:  Atrial fibrillation and flutter  Renal stones  DVT (deep venous thrombosis): 8/2018 after plane ride  GERD (gastroesophageal reflux disease)  History of cardioversion: 3/8/2019 atrial flutter  History of umbilical hernia repair  History of appendectomy  History of lithotripsy: 2/2019      FAMILY HISTORY:  Family history of lung cancer  Family history of COPD (chronic obstructive pulmonary disease)      SOCIAL HISTORY  Smoking History: denies    REVIEW OF SYSTEMS:    CONSTITUTIONAL:  No fevers, chills, sweats    HEENT:  Eyes:  No diplopia or blurred vision. ENT:  No earache, sore throat or runny nose.    CARDIOVASCULAR:  No pressure, squeezing, tightness, or heaviness about the chest; no palpitations.    RESPIRATORY:  Per HPI    GASTROINTESTINAL:  No abdominal pain, nausea, vomiting or diarrhea.    GENITOURINARY:  No dysuria, frequency or urgency.    NEUROLOGIC:  No paresthesias, fasciculations, seizures or weakness.    PSYCHIATRIC:  No disorder of thought or mood.    Vital Signs Last 24 Hrs  T(C): 36.8 (02 Jul 2019 09:47), Max: 36.9 (02 Jul 2019 05:09)  T(F): 98.2 (02 Jul 2019 09:47), Max: 98.5 (02 Jul 2019 05:09)  HR: 133 (02 Jul 2019 12:33) (119 - 139)  BP: 103/78 (02 Jul 2019 12:33) (103/78 - 149/86)  BP(mean): --  RR: 18 (02 Jul 2019 09:47) (16 - 18)  SpO2: 98% (02 Jul 2019 09:47) (94% - 98%)    PHYSICAL EXAMINATION:    GENERAL: The patient is a well-developed, well-nourished male in no apparent distress.     HEENT: Head is normocephalic and atraumatic. Extraocular muscles are intact. Mucous membranes are moist.     NECK: Supple.     LUNGS: Clear to auscultation without wheezing, rales, or rhonchi. Respirations unlabored    HEART: Tachy cardia without murmur.    ABDOMEN: Soft, nontender, and nondistended.  No hepatosplenomegaly is noted.    EXTREMITIES: Without any cyanosis, clubbing, rash, lesions or edema.    NEUROLOGIC: Grossly intact.      LABS:                        13.3   8.10  )-----------( 188      ( 01 Jul 2019 06:54 )             40.2     07-02    141  |  105  |  16.0  ----------------------------<  80  3.9   |  26.0  |  0.86    Ca    8.9      02 Jul 2019 06:33  Mg     2.1     07-02        RADIOLOGY & ADDITIONAL STUDIES:       EXAM:  CT ANGIO CHEST (W)AW IC                          PROCEDURE DATE:  06/29/2019          INTERPRETATION:  CTA chest .  COMPARISON: None.  CLINICAL INFORMATION: Back pain post post cardiac procedure. Assess for   pulmonary embolus or pericardial rupture..  TECHNIQUE: Contiguous axial 1.25 mm slice thickness images of the chest   were obtained after intravenous contrast administration utilizing PE   protocol.  Maximum intensity projection,(MIP) 3-D,  imaging was created and   interpreted.  100 mls of Omnipaque 300 was administered intravenously without   complication and 0 mls were discarded.    FINDINGS:  There are no pulmonary arterial filling defects to suggest pulmonary   embolism.    The mediastinum great vessels are normal.     There are no mediastinal masses or lymphadenopathy.     There is mild cardiomegaly. LEFT atrial enlargement noted. No intra   atrial thrombus or defect seen. No evidence of cardiac rupture. No   pericardial effusion.  The airway is patent showing normal caliber and contour.  LEFT hemiazygous vein shows reflux of injected contrast to the upper   abdomen. There are posterior basilar dependent airspace consolidations   are without pleural effusion.   There is no pleural effusion or pneumothorax.    Visualized upper abdominal viscera unremarkable.    The bones are normal.    IMPRESSION:  Bibasilar posterior sulcus symmetric airspace consolidations.   No evidence of pulmonary embolism.  Cardiomegaly.   LEFT atrial enlargement.        JESUS ALBERTO GREY M.D., ATTENDING RADIOLOGIST  This document has been electronically signed. Jun 29 2019  4:14PM           EXAM:  XR CHEST PORTABLE IMMED 1V                          PROCEDURE DATE:  06/29/2019          INTERPRETATION:  CLINICAL INFORMATION: Chest pain.    TECHNIQUE: Frontal view of the chest   COMPARISON: March 7, 2019.    FINDINGS:    LUNGS/PLEURA: Small left pleural effusion with basilar atelectasis or   consolidation. No pneumothorax.  MEDIASTINUM: Cardiomediastinal silhouette is unremarkable.  OTHER: None.    IMPRESSION:     Small left pleural effusion with basilar atelectasis or consolidation.       JOSE ALEJANDRO TREVIÑO M.D., ATTENDING RADIOLOGIST  This document has been electronically signed. Jun 29 2019  3:28PM          ECHO:      Summary:   1. Left ventricular ejection fraction, by visual estimation, is 50 to   55%.   2. There is no evidence of pericardial effusion.   3. Mild mitral valve regurgitation.   4. Dilatation of the aortic root.   5. Color Doppler demonstrates the presence of a very small left to right   shunt at the Atrial level.    Vin Snyder MD Electronically signed on 7/1/2019 at 5:27:36 PM

## 2019-07-03 NOTE — PROGRESS NOTE ADULT - ASSESSMENT
A/P: 57 yo male with pmhx GERD, DVT 8/2018, atrial flutter s/p MARINA/DCCV 3/8/19,  persistent atrial fibrillation who previously failed multaq s/p recent elective atrial flutter and atrial fibrillation (6/27/19-Dym) maintained on eliquis. Pt was discharged in sinus rhythm 6/28/19. Pt presented to ER 6/29/19 with c/o of back and chest pain. He was admitted with AF RVR and an incidental finding of b/l PNA on CT (CT was negative for atrial-esophageal fistula, TTE neg for effusion).  Patient failed DCCV x 2, as well as chemical cardioversion with Ibutilide 7/1/19 and was initiated on amiodarone 7/2/19. Heart rates still 120-140bpm. Patient once again underwent sedation and DCCV on 7/3/19. Currently in SR    - Continue uninterrupted anticoagulation  - Amiodarone 400mg PO BID x 10 days followed by 200mg PO QD till follow up with EP  - Consider decreasing beta blocker if patient bradycardic post cardioversion  - Continue Protonix and Carafate for esophagus protection post long and extensive ablation  - If stable discharge planning home tomorrow AM.

## 2019-07-03 NOTE — PROGRESS NOTE ADULT - ASSESSMENT
57 yo male with pmhx GERD, DVT 8/2018, atrial flutter s/p MARINA/DCCV 3/8/19,  persistent atrial fibrillation who previously failed multaq s/p recent elective atrial flutter and atrial fibrillation (6/27/19-Dym) maintained on eliquis. Pt was discharged in sinus rhythm 6/28/19. Pt presented to ER 6/29/19 with c/o of back and chest pain. He was admitted with AF RVR and an incidental finding of b/l PNA on CT (CT was negative for atrial-esophageal fistula, TTE neg for effusion).  Patient failed DCCV x 2, as well as chemical cardioversion with Ibutilide 7/1/19 and was initiated on amiodarone 7/2/19. Heart rates still 120-140bpm    - keep npo for IV amio bolus and reattempt at DCCV today. Continue po amiodarone and uninterrupted eliquis  -Continue Carafate and protonix post AF ablation  -consider decreasing amiodarone and metoprolol post DCCV  -Pulmonary consult appreciated- recommend outpt f/u  for: L>R LL atelectasis vs consolidation which may be the result of poor inspiration during procedure or from bibasilar pneumonia though clinically pt currently appears well. F/u chest CT as outpt but if findings persist, may need PET/CT and bx or at least inspection    will amandeep Aaron

## 2019-07-03 NOTE — PROGRESS NOTE ADULT - ASSESSMENT
57 y/o male with s/p cardiac ablation for a flutter 3 days ago, h/o DVT on Eliquis came to the ED complaining of chest pain radiating to the back x 1 day. Patient had ablation done 3 days ago, said he was fine after the procedure but on Friday started having chest pain that felt like muscle pull radiating to the back worsen with respiration; associated with dry cough.  He called Dr. Aaron today who recommended to come to the ED. EKG in the ER consistent with Afib with rvr. CTA of the chest was negative. Troponin elevated to 0.54 repeat of 0.2 with BNp elevated in the 700s. Evaluated by cardiology recommended rate control with lopressor and repeat Echocardiogram. CTA of the chest showed evidence of bibasilar infiltrates; started on IV rocephin for pneumonia.    Assessment/Plan:    1. Afib with RVR status post recent ablation for a flutter 3d prior to admit: tachycardia persistent  complicated by nstemi, tachy induced w/ poss elevation also from recent ablation procedure, peak trop 0.54  Continue telemetry monitoring  PO metoprolol 25mg Q6 hours as recommended by cardiology   echocardiogram report, ef 50-55%, small left to right shunt, aortic root dilatation, severity not quantified  ss Cardiology following, sp dccv 7/1, well tolerated, uncomplicated however unsuccessful  on doac for ablation procedure, to be continued post cardioversion, usually for 3wks, final duration per cardio/ep  amio currently being loaded for rate control  fu w/ ep regarding plan    2. Bibasilar pneumonia: stable.   noted on ct, pt minimally symptomatic  On rocephin + zithro, 5d course, last day 7/3  Florastor  cardio request pulm cs, eval appreciated, repeat ct chest as outpt, if persistent abn noted despite completed tx for pna plan for PET/CT for additional investigation  outpt fu for psg to screen underlying factors contributing to tachycardia    3. History of RT Gastrocnemius DVT on Eliquis BID  dx 09/2018, already completed 3mo treatment, and eliquis stopped  resolution confirmed outpt on us ordered by his vasc sx Dr Villagran    DVT prophylaxis: Eliquis     dispo: likely dc home in 1-2 days pending rate control    outpt fu. pmd, cardio, ep 59 y/o male with s/p cardiac ablation for a flutter 3 days ago, h/o DVT on Eliquis came to the ED complaining of chest pain radiating to the back x 1 day. Patient had ablation done 3 days ago, said he was fine after the procedure but on Friday started having chest pain that felt like muscle pull radiating to the back worsen with respiration; associated with dry cough.  He called Dr. Aaron today who recommended to come to the ED. EKG in the ER consistent with Afib with rvr. CTA of the chest was negative. Troponin elevated to 0.54 repeat of 0.2 with BNp elevated in the 700s. Evaluated by cardiology recommended rate control with lopressor and repeat Echocardiogram. CTA of the chest showed evidence of bibasilar infiltrates; started on IV rocephin for pneumonia.    Assessment/Plan:    1. Afib with RVR status post recent ablation for a flutter 3d prior to admit: tachycardia persistent  complicated by elevated trop, discussed w/ EP, etiology likely tachy induced w/ likely elevation also from recent ablation procedure, peak trop 0.54  Continue telemetry monitoring  PO metoprolol 25mg Q6 hours as recommended by cardiology   echocardiogram report, ef 50-55%, small left to right shunt, aortic root dilatation, severity not quantified  ss Cardiology following, sp dccv 7/1, well tolerated, uncomplicated however unsuccessful  on doac, confirmed pt will need lifelong ac  amio currently being loaded for rate control, dosing per EP  repeat dccv on 7/3 due to continued tachycardia, monitor for continued rate control, if stable poss dc home tomorrow am    2. Bibasilar pneumonia: stable.   noted on ct, pt minimally symptomatic  On rocephin + zithro, 5d course, last day 7/3  Florastor  cardio request pulm cs, eval appreciated, repeat ct chest as outpt, if persistent abn noted despite completed tx for pna plan for PET/CT for additional investigation  outpt fu for psg to screen underlying factors contributing to tachycardia    3. History of RT Gastrocnemius DVT on Eliquis BID  dx 09/2018, already completed 3mo treatment, and eliquis stopped  resolution confirmed outpt on us ordered by his vasc sx Dr Villagran    DVT prophylaxis: Eliquis     dispo: likely dc home in am pending continued rate control    outpt fu. pmd, cardio, ep

## 2019-07-04 ENCOUNTER — TRANSCRIPTION ENCOUNTER (OUTPATIENT)
Age: 59
End: 2019-07-04

## 2019-07-04 VITALS
OXYGEN SATURATION: 98 % | SYSTOLIC BLOOD PRESSURE: 107 MMHG | RESPIRATION RATE: 18 BRPM | TEMPERATURE: 98 F | DIASTOLIC BLOOD PRESSURE: 68 MMHG | HEART RATE: 61 BPM

## 2019-07-04 PROCEDURE — 92960 CARDIOVERSION ELECTRIC EXT: CPT

## 2019-07-04 PROCEDURE — 84484 ASSAY OF TROPONIN QUANT: CPT

## 2019-07-04 PROCEDURE — 85027 COMPLETE CBC AUTOMATED: CPT

## 2019-07-04 PROCEDURE — 71045 X-RAY EXAM CHEST 1 VIEW: CPT

## 2019-07-04 PROCEDURE — 93010 ELECTROCARDIOGRAM REPORT: CPT

## 2019-07-04 PROCEDURE — 36415 COLL VENOUS BLD VENIPUNCTURE: CPT

## 2019-07-04 PROCEDURE — 82550 ASSAY OF CK (CPK): CPT

## 2019-07-04 PROCEDURE — 80048 BASIC METABOLIC PNL TOTAL CA: CPT

## 2019-07-04 PROCEDURE — 99285 EMERGENCY DEPT VISIT HI MDM: CPT | Mod: 25

## 2019-07-04 PROCEDURE — 85610 PROTHROMBIN TIME: CPT

## 2019-07-04 PROCEDURE — 83735 ASSAY OF MAGNESIUM: CPT

## 2019-07-04 PROCEDURE — 85730 THROMBOPLASTIN TIME PARTIAL: CPT

## 2019-07-04 PROCEDURE — 96365 THER/PROPH/DIAG IV INF INIT: CPT | Mod: XU

## 2019-07-04 PROCEDURE — 83880 ASSAY OF NATRIURETIC PEPTIDE: CPT

## 2019-07-04 PROCEDURE — 82553 CREATINE MB FRACTION: CPT

## 2019-07-04 PROCEDURE — 80053 COMPREHEN METABOLIC PANEL: CPT

## 2019-07-04 PROCEDURE — 99239 HOSP IP/OBS DSCHRG MGMT >30: CPT

## 2019-07-04 PROCEDURE — 96375 TX/PRO/DX INJ NEW DRUG ADDON: CPT

## 2019-07-04 PROCEDURE — 71275 CT ANGIOGRAPHY CHEST: CPT

## 2019-07-04 PROCEDURE — 93306 TTE W/DOPPLER COMPLETE: CPT

## 2019-07-04 PROCEDURE — 93005 ELECTROCARDIOGRAM TRACING: CPT

## 2019-07-04 RX ORDER — AMIODARONE HYDROCHLORIDE 400 MG/1
2 TABLET ORAL
Qty: 120 | Refills: 0
Start: 2019-07-04 | End: 2019-08-02

## 2019-07-04 RX ORDER — METOPROLOL TARTRATE 50 MG
1 TABLET ORAL
Qty: 60 | Refills: 0
Start: 2019-07-04 | End: 2019-08-02

## 2019-07-04 RX ADMIN — PANTOPRAZOLE SODIUM 40 MILLIGRAM(S): 20 TABLET, DELAYED RELEASE ORAL at 05:28

## 2019-07-04 RX ADMIN — Medication 1 GRAM(S): at 05:28

## 2019-07-04 RX ADMIN — AMIODARONE HYDROCHLORIDE 400 MILLIGRAM(S): 400 TABLET ORAL at 05:28

## 2019-07-04 RX ADMIN — Medication 250 MILLIGRAM(S): at 05:28

## 2019-07-04 RX ADMIN — APIXABAN 5 MILLIGRAM(S): 2.5 TABLET, FILM COATED ORAL at 05:27

## 2019-07-04 RX ADMIN — Medication 25 MILLIGRAM(S): at 00:01

## 2019-07-04 NOTE — PROGRESS NOTE ADULT - PROVIDER SPECIALTY LIST ADULT
Cardiology
Electrophysiology
Hospitalist
Pulmonology
Cardiology

## 2019-07-04 NOTE — DISCHARGE NOTE PROVIDER - NSDCCPCAREPLAN_GEN_ALL_CORE_FT
PRINCIPAL DISCHARGE DIAGNOSIS  Diagnosis: Atrial fibrillation with RVR  Assessment and Plan of Treatment: underwent successful cardioversion  continue amiodarone 400mg twice daily x 10 days then 200mg daily   follow up with cardiology in 1 week.  continue carafate and nexium      SECONDARY DISCHARGE DIAGNOSES  Diagnosis: Abnormal chest CT  Assessment and Plan of Treatment: repeat xray or ct scan as outpatient.  follow up with pulmonology.

## 2019-07-04 NOTE — PROGRESS NOTE ADULT - SUBJECTIVE AND OBJECTIVE BOX
CHIEF COMPLAINT:Patient is a 58y old  Male who presents with a chief complaint of Afib (2019 18:42)      INTERVAL HISTORY: "feel good"   s/p DCCV yesterday. SB, sleeping 47bpm; SB/SR while awake.       Allergies  No Known Allergies  Intolerances    	  MEDICATIONS:  amiodarone    Tablet 400 milliGRAM(s) Oral two times a day  metoprolol tartrate 25 milliGRAM(s) Oral every 6 hours  ALBUTerol    0.083% 2.5 milliGRAM(s) Nebulizer every 6 hours PRN  pantoprazole    Tablet 40 milliGRAM(s) Oral two times a day  sucralfate suspension 1 Gram(s) Oral two times a day  apixaban 5 milliGRAM(s) Oral every 12 hours        PHYSICAL EXAM:    T(C): 36.6 (19 @ 05:25), Max: 36.7 (19 @ 20:39)  HR: 64 (19 @ 05:25) (53 - 132)  BP: 92/58 (19 @ 05:25) (90/54 - 128/74)  RR: 16 (19 @ 05:25) (15 - 19)  SpO2: 94% (19 @ 05:25) (94% - 100%)      I&O's Summary    2019 07:01  -  2019 07:00  --------------------------------------------------------  IN: 630 mL / OUT: 350 mL / NET: 280 mL    Daily Weight in k.2 (2019 05:16)    Appearance: Normal	  HEENT:   NC/AT  Eye: Pink Conjunctiva  Lungs: CTA B/L  CVS: RRR, Normal S1 and S2, No Edema  Pulses: Normal distal pulses.  Neuro: A&O x3      TELEMETRY: SB/SR APCs	    ECG:   RADIOLOGY:       LABS:	 	  CARDIAC MARKERS:                        13.3   6.79  )-----------( 239      ( 2019 05:50 )             40.4     07-    140  |  105  |  17.0  ----------------------------<  105  3.8   |  25.0  |  1.03    Ca    8.6      2019 05:50  Mg     1.9     07-03

## 2019-07-04 NOTE — PROGRESS NOTE ADULT - ASSESSMENT
A/P: 57 yo male with pmhx GERD, DVT 8/2018, atrial flutter s/p MARINA/DCCV 3/8/19,  persistent atrial fibrillation who previously failed multaq s/p recent elective atrial flutter and atrial fibrillation (6/27/19-Dym) maintained on eliquis. Pt was discharged in sinus rhythm 6/28/19. Pt presented to ER 6/29/19 with c/o of back and chest pain. He was admitted with AF RVR and an incidental finding of b/l PNA on CT (CT was negative for atrial-esophageal fistula, TTE neg for effusion).  Patient failed DCCV x 2, as well as chemical cardioversion with Ibutilide 7/1/19 and was initiated on amiodarone 7/2/19. Heart rates still 120-140bpm. Patient once again underwent sedation and DCCV on 7/3/19. Currently in SR    - Continue uninterrupted anticoagulation  - Amiodarone 400mg PO BID x 10 days (started 7/4) followed by 200mg PO QD till follow up with EP  - Sinus Simba- decrease BB- metoprolol 25mg PO two times daily  - Continue Protonix and Carafate for esophagus protection post long and extensive ablation  - discharge home- follow up next week with EP in office.

## 2019-07-04 NOTE — DISCHARGE NOTE PROVIDER - CARE PROVIDER_API CALL
Dangelo Aaron)  Cardiovascular Disease; Internal Medicine  [address] [place] [address]  Phone: [phone]  Fax: [phone]  Follow Up Time:

## 2019-07-04 NOTE — DISCHARGE NOTE PROVIDER - HOSPITAL COURSE
57 yo male with pmhx GERD, DVT 8/2018, atrial flutter s/p MARINA/DCCV 3/8/19,  persistent atrial fibrillation who previously failed multaq s/p recent elective atrial flutter and atrial fibrillation (6/27/19-Dym) maintained on eliquis. Pt was discharged in sinus rhythm 6/28/19. Pt presented to ER 6/29/19 with c/o of back and chest pain.         noted to have Afib/flutter with RVR and incidental bilateral lower lobe consolidation on CT chest.    Treated for suspected pneumonia and antibiotics discontinued as clinical low suspicion.  Pulmonary thought atelectasis or poor inspiration during procedure.    Seen by cardiology/EP, underwent DCCV on 7/3 successfully and placed on amiodarone load then maintenance.        stable for discharge home with outpatient cardiology follow up.        dc planning 32 minutes.    vitals stable, sinus bradycardia.    no cough/chest pain or shortness of breath    aaox3 nad rrr s1s2 ctab soft abdomen no LE edema nonfocal neuro.

## 2019-07-09 ENCOUNTER — APPOINTMENT (OUTPATIENT)
Dept: ELECTROPHYSIOLOGY | Facility: CLINIC | Age: 59
End: 2019-07-09
Payer: COMMERCIAL

## 2019-07-09 VITALS
SYSTOLIC BLOOD PRESSURE: 115 MMHG | OXYGEN SATURATION: 96 % | HEIGHT: 74 IN | HEART RATE: 57 BPM | BODY MASS INDEX: 27.85 KG/M2 | DIASTOLIC BLOOD PRESSURE: 65 MMHG | WEIGHT: 217 LBS

## 2019-07-09 PROCEDURE — 99214 OFFICE O/P EST MOD 30 MIN: CPT

## 2019-07-09 PROCEDURE — 93000 ELECTROCARDIOGRAM COMPLETE: CPT

## 2019-07-09 NOTE — HISTORY OF PRESENT ILLNESS
[FreeTextEntry1] : Mr. Garcia is a very pleasant 58-year-old / for [facility] with a PMHx\par of GERD and DVT not on AC who recently presented to the ER in typical atrial flutter w/RVR. He underwent MARINA/DCCV with successful restoration of NSR. However, since then he's developed atrial fibrillation. He is now s/p catheter ablation of his AF and atrial flutter 6/27/19. He presented 2 days later with recurrent chest pain and was found to have b/l LL PNA on chest CTA. No e/o pericarditis or AEF. He was in AF w/RVR and underwent attempted DCCV with the aid of ibutilide but was unsuccessful and he was found to be in an atrial flutter (likely left sided) ~125 bpm. He was then loaded with oral amiodarone and repeat DCCV successfully restored NSR. He now presents for f/u from that hospitalization. \par \par He's doing well and states he just has a little substernal chest discomfort occasionally (unrelated to oral intake) but otherwise denies any gianni CP, SHIRLEY, orthopnea/PND/LE edema, LH/dizziness/syncope, palpitations, or n/v/diaphoresis.

## 2019-07-09 NOTE — PHYSICAL EXAM
[Normal Appearance] : normal appearance [General Appearance - Well Developed] : well developed [Well Groomed] : well groomed [General Appearance - Well Nourished] : well nourished [No Deformities] : no deformities [General Appearance - In No Acute Distress] : no acute distress [Normal Conjunctiva] : the conjunctiva exhibited no abnormalities [Normal Oral Mucosa] : normal oral mucosa [No Oral Cyanosis] : no oral cyanosis [Normal Jugular Venous V Waves Present] : normal jugular venous V waves present [Respiration, Rhythm And Depth] : normal respiratory rhythm and effort [Exaggerated Use Of Accessory Muscles For Inspiration] : no accessory muscle use [Heart Sounds] : normal S1 and S2 [Auscultation Breath Sounds / Voice Sounds] : lungs were clear to auscultation bilaterally [Murmurs] : no murmurs present [Arterial Pulses Normal] : the arterial pulses were normal [FreeTextEntry1] : irr rhythm [Edema] : no peripheral edema present [Abdomen Soft] : soft [Abdomen Tenderness] : non-tender [Abnormal Walk] : normal gait [Nail Clubbing] : no clubbing of the fingernails [Cyanosis, Localized] : no localized cyanosis [Skin Color & Pigmentation] : normal skin color and pigmentation [Skin Turgor] : normal skin turgor [] : no rash [Oriented To Time, Place, And Person] : oriented to person, place, and time [Impaired Insight] : insight and judgment were intact [Affect] : the affect was normal [Mood] : the mood was normal [No Anxiety] : not feeling anxious

## 2019-07-09 NOTE — REASON FOR VISIT
[Atrial Fibrillation] : atrial fibrillation [Consultation] : a consultation regarding [FreeTextEntry1] : Referring: Dr. Aguirre [Spouse] : spouse

## 2019-07-09 NOTE — DISCUSSION/SUMMARY
[FreeTextEntry1] : 57 yo w/hx of GERD, DVT, atrial flutter, and persistent AF now s/p CTI/PVI RFA. Post-op c/b b/l LL PNA and recurrent AF w/RVR and left atrial flutter s/p DCCV and oral amiodarone load with successful restoration of NSR. Remains on Eliquis for CHADS-VASC = 0 (s/p RFA). \par \par Doing well. Toleration amiodarone/NOAC therapy well.\par \par Recommendations:\par 1. F/u pulmonary re: resolution of chest CT findings\par 2. Continue Eliquis and GI ppx meds\par 3. RTC within 2 months- plan to d/c amiodarone after 3 month blanking/meagan period\par 4. If atrial flutter recurs, will consider repeat RFA vs. Class IC AAD

## 2019-07-23 ENCOUNTER — RECORD ABSTRACTING (OUTPATIENT)
Age: 59
End: 2019-07-23

## 2019-07-23 ENCOUNTER — APPOINTMENT (OUTPATIENT)
Dept: CARDIOLOGY | Facility: CLINIC | Age: 59
End: 2019-07-23
Payer: COMMERCIAL

## 2019-07-23 VITALS
DIASTOLIC BLOOD PRESSURE: 70 MMHG | OXYGEN SATURATION: 98 % | BODY MASS INDEX: 27.98 KG/M2 | HEART RATE: 70 BPM | WEIGHT: 218 LBS | SYSTOLIC BLOOD PRESSURE: 118 MMHG | HEIGHT: 74 IN

## 2019-07-23 PROCEDURE — 99214 OFFICE O/P EST MOD 30 MIN: CPT

## 2019-07-23 PROCEDURE — 93000 ELECTROCARDIOGRAM COMPLETE: CPT

## 2019-07-23 RX ORDER — DRONEDARONE 400 MG/1
400 TABLET, FILM COATED ORAL TWICE DAILY
Qty: 180 | Refills: 0 | Status: DISCONTINUED | COMMUNITY
End: 2019-07-23

## 2019-07-23 RX ORDER — SUCRALFATE 1 G/10ML
1 SUSPENSION ORAL TWICE DAILY
Refills: 0 | Status: DISCONTINUED | COMMUNITY
End: 2019-07-23

## 2019-07-23 RX ORDER — PANTOPRAZOLE 40 MG/1
40 TABLET, DELAYED RELEASE ORAL
Qty: 90 | Refills: 1 | Status: DISCONTINUED | COMMUNITY
End: 2019-07-23

## 2019-07-23 RX ORDER — ATENOLOL 25 MG/1
25 TABLET ORAL DAILY
Qty: 30 | Refills: 3 | Status: DISCONTINUED | COMMUNITY
Start: 2019-04-11 | End: 2019-07-23

## 2019-07-23 NOTE — REVIEW OF SYSTEMS
[Recent Weight Gain (___ Lbs)] : no recent weight gain [Headache] : no headache [Feeling Fatigued] : not feeling fatigued [Recent Weight Loss (___ Lbs)] : no recent weight loss [Blurry Vision] : no blurred vision [Eyeglasses] : currently wearing eyeglasses [Eye Pain] : no eye pain [Discharge From The Ears] : no discharge from the ears [Mouth Sores] : no mouth sores [Earache] : no earache [Sore Throat] : no sore throat [Dyspnea on exertion] : not dyspnea during exertion [Lower Ext Edema] : no extremity edema [Palpitations] : no palpitations [Chest Pain] : no chest pain [Cough] : cough [Wheezing] : no wheezing [Vomiting] : no vomiting [Nausea] : no nausea [Heartburn] : no heartburn [Urinary Frequency] : no change in urinary frequency [Change In The Stool] : no change in stool [Change in Appetite] : no change in appetite [Hematuria] : no hematuria [Nocturia] : no nocturia [Impotence] : no impotence [Joint Pain] : no joint pain [Joint Swelling] : no joint swelling [Joint Stiffness] : no joint stiffness [Limb Weakness (Paresis)] : no limb weakness [Muscle Cramps] : no muscle cramps [Skin: A Rash] : no rash: [Skin Lesions] : no skin lesions [Confusion] : no confusion was observed [Dizziness] : no dizziness [Convulsions] : no convulsions [Excessive Thirst] : no polydipsia [Anxiety] : no anxiety [Easy Bleeding] : no tendency for easy bleeding [Easy Bruising] : no tendency for easy bruising [Swollen Glands] : no swollen glands

## 2019-07-23 NOTE — HISTORY OF PRESENT ILLNESS
[FreeTextEntry1] : Mr. Garcia is a very pleasant 58-year-old / for [facility] with a PMHx\par of GERD and DVT not on AC who recently presented to the ER in typical atrial flutter w/RVR. He underwent MARINA/DCCV with successful restoration of NSR. However, since then he's developed atrial fibrillation. He is now s/p catheter ablation of his AF and atrial flutter 6/27/19. He presented 2 days later with recurrent chest pain and was found to have b/l LL PNA on chest CTA. No e/o pericarditis or AEF. He was in AF w/RVR and underwent attempted DCCV with the aid of ibutilide but was unsuccessful and he was found to be in an atrial flutter (likely left sided) ~125 bpm. He was then loaded with oral amiodarone and repeat DCCV successfully restored NSR. He now presents for f/u from that hospitalization. \par \par He's doing well and states he just has a little substernal chest discomfort occasionally (unrelated to oral intake) but otherwise denies any gianni CP, SHIRLEY, orthopnea/PND/LE edema, LH/dizziness/syncope, palpitations, or n/v/diaphoresis. \par \par 07/23/19:\par Pt was treated for pna after ablation, feels better, still has dry cough. no cp, no cough with food intake. \par no fever or chills\par has post nasal drip. \par In afib, on amiodarone

## 2019-07-23 NOTE — ASSESSMENT
[FreeTextEntry1] : 1. BP to goal\par 2. afib, controlled rate. \par 3. trial of steroids if not better we will scan\par 4. Flonase for rhinitis \par

## 2019-07-23 NOTE — PHYSICAL EXAM
[Well Groomed] : well groomed [Normal Conjunctiva] : the conjunctiva exhibited no abnormalities [No Deformities] : no deformities [General Appearance - In No Acute Distress] : no acute distress [Eyelids - No Xanthelasma] : the eyelids demonstrated no xanthelasmas [Normal Oral Mucosa] : normal oral mucosa [No Oral Pallor] : no oral pallor [Normal Oropharynx] : normal oropharynx [Normal Jugular Venous V Waves Present] : normal jugular venous V waves present [Respiration, Rhythm And Depth] : normal respiratory rhythm and effort [Auscultation Breath Sounds / Voice Sounds] : lungs were clear to auscultation bilaterally [Heart Sounds] : normal S1 and S2 [Arterial Pulses Normal] : the arterial pulses were normal [Edema] : no peripheral edema present [Veins - Varicosity Changes] : no varicosital changes were noted in the lower extremities [FreeTextEntry1] : IRRR. [Bowel Sounds] : normal bowel sounds [Abdomen Soft] : soft [Abdomen Tenderness] : non-tender [Abnormal Walk] : normal gait [Gait - Sufficient For Exercise Testing] : the gait was sufficient for exercise testing [Nail Clubbing] : no clubbing of the fingernails [Cyanosis, Localized] : no localized cyanosis [Petechial Hemorrhages (___cm)] : no petechial hemorrhages [Skin Color & Pigmentation] : normal skin color and pigmentation [Skin Turgor] : normal skin turgor [Impaired Insight] : insight and judgment were intact [Oriented To Time, Place, And Person] : oriented to person, place, and time

## 2019-07-25 ENCOUNTER — APPOINTMENT (OUTPATIENT)
Dept: ELECTROPHYSIOLOGY | Facility: CLINIC | Age: 59
End: 2019-07-25

## 2019-09-05 ENCOUNTER — APPOINTMENT (OUTPATIENT)
Dept: PULMONOLOGY | Facility: CLINIC | Age: 59
End: 2019-09-05
Payer: COMMERCIAL

## 2019-09-05 VITALS
HEIGHT: 74 IN | BODY MASS INDEX: 29.13 KG/M2 | SYSTOLIC BLOOD PRESSURE: 118 MMHG | OXYGEN SATURATION: 97 % | DIASTOLIC BLOOD PRESSURE: 68 MMHG | HEART RATE: 100 BPM | WEIGHT: 227 LBS

## 2019-09-05 DIAGNOSIS — R07.89 OTHER CHEST PAIN: ICD-10-CM

## 2019-09-05 DIAGNOSIS — R05 COUGH: ICD-10-CM

## 2019-09-05 DIAGNOSIS — K21.9 GASTRO-ESOPHAGEAL REFLUX DISEASE W/OUT ESOPHAGITIS: ICD-10-CM

## 2019-09-05 DIAGNOSIS — J31.0 CHRONIC RHINITIS: ICD-10-CM

## 2019-09-05 DIAGNOSIS — Z09 ENCOUNTER FOR FOLLOW-UP EXAMINATION AFTER COMPLETED TREATMENT FOR CONDITIONS OTHER THAN MALIGNANT NEOPLASM: ICD-10-CM

## 2019-09-05 PROCEDURE — 94010 BREATHING CAPACITY TEST: CPT

## 2019-09-05 PROCEDURE — 99214 OFFICE O/P EST MOD 30 MIN: CPT | Mod: 25

## 2019-09-05 RX ORDER — METHYLPREDNISOLONE 4 MG/1
4 TABLET ORAL
Qty: 1 | Refills: 0 | Status: DISCONTINUED | COMMUNITY
Start: 2019-07-23 | End: 2019-09-05

## 2019-09-05 NOTE — REASON FOR VISIT
[Follow-Up - From Hospitalization] : a hospitalization follow-up [Abnormal CT Scan] : abnormal CT Scan

## 2019-09-05 NOTE — PROCEDURE
[FreeTextEntry1] : review of d/c summary from [facility]\par \par CTA chest 6/29/19 reviewed\par \par echo done 7/1/19 reviewed report\par \par spirometry: no obstruction.

## 2019-09-05 NOTE — HISTORY OF PRESENT ILLNESS
[FreeTextEntry1] : S/P hosp at [facility] 6/29-7/4/19 for chest pain; dx with pna. D/C summary as follows:\par ===59 yo male with pmhx GERD, DVT 8/2018, atrial flutter s/p MARINA/DCCV 3/8/19, \par persistent atrial fibrillation who previously failed multaq s/p recent elective \par atrial flutter and atrial fibrillation (6/27/19-Dym) maintained on eliquis. Pt \par was discharged in sinus rhythm 6/28/19. Pt presented to ER 6/29/19 with c/o of \par back and chest pain. \par \par noted to have Afib/flutter with RVR and incidental bilateral lower lobe \par consolidation on CT chest. \par Treated for suspected pneumonia and antibiotics discontinued as clinical low \par suspicion.  Pulmonary thought atelectasis or poor inspiration during procedure. \par Seen by cardiology/EP, underwent DCCV on 7/3 successfully and placed on \par amiodarone load then maintenance. \par \par stable for discharge home with outpatient cardiology follow up. ===\par \par Had another episode of SSCP end of July. Saw Dr Aguirre; possible bronchitis. Given steroids with taper and nasal spray. \par \par Now improved. Denies any sob, wheeze, cough. No hx of respiratory symptoms prior to this episode. \par \par On eliquis.\par \par Hx DVT in August 2018 after trip from [place]. Placed on eliquis for 2-3 months. \par

## 2019-09-05 NOTE — CONSULT LETTER
[Dear  ___] : Dear  [unfilled], [Courtesy Letter:] : I had the pleasure of seeing your patient, [unfilled], in my office today. [Consult Closing:] : Thank you very much for allowing me to participate in the care of this patient.  If you have any questions, please do not hesitate to contact me. [DrDeepak  ___] : Dr. EMDEL

## 2019-09-05 NOTE — ASSESSMENT
[FreeTextEntry1] : Changes on CT in hospital could have been secondary to atelectasis from the procedure vs pna which was treated while there. Need f/u to ensure resolution. Nno obstruction seen on barbara. Also, given very crowded oropharynx and hx of afib, I am concerned for JENNIFER.

## 2019-09-05 NOTE — PHYSICAL EXAM
[General Appearance - In No Acute Distress] : no acute distress [Normal Conjunctiva] : the conjunctiva exhibited no abnormalities [Low Lying Soft Palate] : low lying soft palate [Enlarged Base of the Tongue] : enlargement of the base of the tongue [Elongated Uvula] : elongated uvula [III] : III [Neck Appearance] : the appearance of the neck was normal [] : the neck was supple [Heart Sounds] : normal S1 and S2 [Normal Rate] : the respiratory rate was normal [Rate ___] : at [unfilled] breaths per minute [Clear Bilaterally] : the lungs were clear to auscultation bilaterally [Normal Breath Sounds] : normal bilateral breath sounds [Bowel Sounds] : normal bowel sounds [Abdomen Soft] : soft [Abnormal Walk] : normal gait [Nail Clubbing] : no clubbing of the fingernails [Cyanosis, Localized] : no localized cyanosis [Skin Color & Pigmentation] : normal skin color and pigmentation [No Focal Deficits] : no focal deficits [Impaired Insight] : insight and judgment were intact [Oriented To Time, Place, And Person] : oriented to person, place, and time [Affect] : the affect was normal [Normal Oropharynx] : abnormal oropharynx [FreeTextEntry1] : irr rhythm

## 2019-09-10 ENCOUNTER — APPOINTMENT (OUTPATIENT)
Dept: ELECTROPHYSIOLOGY | Facility: CLINIC | Age: 59
End: 2019-09-10
Payer: COMMERCIAL

## 2019-09-10 ENCOUNTER — NON-APPOINTMENT (OUTPATIENT)
Age: 59
End: 2019-09-10

## 2019-09-10 VITALS
DIASTOLIC BLOOD PRESSURE: 68 MMHG | HEIGHT: 74 IN | WEIGHT: 225 LBS | HEART RATE: 87 BPM | BODY MASS INDEX: 28.88 KG/M2 | OXYGEN SATURATION: 96 % | SYSTOLIC BLOOD PRESSURE: 122 MMHG

## 2019-09-10 PROCEDURE — 99215 OFFICE O/P EST HI 40 MIN: CPT | Mod: 25

## 2019-09-10 PROCEDURE — 93000 ELECTROCARDIOGRAM COMPLETE: CPT

## 2019-09-10 NOTE — HISTORY OF PRESENT ILLNESS
[FreeTextEntry1] : Volodymyr Garcia is a 58 year old male / at [facility] with GERD, provoked DVT, atrial flutter/atrial fibrillation (on Apixaban) who presents for follow up of his AF/AFL.\par \par To summarize his history, he was first seen in our system in early 2019 when he presented to the ER in typical atrial flutter. During atrial flutter and atrial fibrillation he felt he "had the flu." He underwent MARINA/CV with successful restoration of normal rhythm. After the cardioversion, his symptoms improved dramatically. However, later developed atrial fibrillation. He underwent catheter ablation of AF and atrial flutter by Dr. Aaron on 6/27/19. His post-operative course was complicated by chest discomfort and bilateral lower lobe pneumonias.In this setting, he had early recurrence of AF. He had an attempted cardioversion on 7/1/19 which failed to restore normal rhythm despite using Ibutilide. He was loaded with amiodarone.and repeat CV on 7/3/19 was successful in restoring normal rhythm. He recently saw Dr. Aguirre in follow up in July at which time he was noted to be back in AF. At that time, he also had complaints of bronchitis and so was given a steroid taper with improvement.\par \par Today, he states his breathing has improved since his visit with Dr. Aguirre. He still feels a bit tired, and notes that when he was in sinus rhythm after his cardioversion in July, he had much more energy. He denies having any dyspnea, chest discomfort, palpitations, dizziness, lightheadedness, syncope, peripheral edema, orthopnea, or PND.

## 2019-09-10 NOTE — DISCUSSION/SUMMARY
[FreeTextEntry1] : In summary, this is a 58 year old male / at [facility] with GERD, DVT, atrial flutter/atrial fibrillation (on Apixaban) who presents for follow up of his AF/AFL.\par \par He continues to remain in rate controlled atrial flutter. Though he does not have severe symptoms, he continues to have complaints of fatigue which were not present prior to having AF. Since he remains in the 3 month blanking period after ablation, we will have him continue amiodarone for now.\par \par He will return in 3 weeks for repeat ECG. If he is in atrial flutter, we can discuss options for management including either repeat cardioversion or repeat catheter ablation. It is unlikely he will remain in sinus rhythm with cardioversion alone as he has had persistent arrhythmia despite being on amiodarone.\par \par He also has a desire to undergo a screening colonoscopy. We can plan for him to hold anticoagulation for the procedure, and if he chooses to pursue rhythm control, we can perform MARINA to rule out LA thrombus.\par \par Recommendations:\par - Continue Apixaban and Amiodarone\par - RTC in 3 weeks, if still in AFL, then can discuss CV vs repeat catheter ablation\par - Screening colonoscopy prior to next rhythm control intervention

## 2019-09-10 NOTE — PHYSICAL EXAM
[General Appearance - Well Developed] : well developed [Normal Appearance] : normal appearance [General Appearance - Well Nourished] : well nourished [Normal Conjunctiva] : the conjunctiva exhibited no abnormalities [Eyelids - No Xanthelasma] : the eyelids demonstrated no xanthelasmas [Normal Oral Mucosa] : normal oral mucosa [Normal Oropharynx] : normal oropharynx [Respiration, Rhythm And Depth] : normal respiratory rhythm and effort [Normal Jugular Venous V Waves Present] : normal jugular venous V waves present [Heart Sounds] : normal S1 and S2 [Auscultation Breath Sounds / Voice Sounds] : lungs were clear to auscultation bilaterally [Arterial Pulses Normal] : the arterial pulses were normal [Murmurs] : no murmurs present [Edema] : no peripheral edema present [Bowel Sounds] : normal bowel sounds [Abnormal Walk] : normal gait [Abdomen Soft] : soft [Abdomen Tenderness] : non-tender [Cyanosis, Localized] : no localized cyanosis [Skin Turgor] : normal skin turgor [Nail Clubbing] : no clubbing of the fingernails [] : no rash [Oriented To Time, Place, And Person] : oriented to person, place, and time [Affect] : the affect was normal [Impaired Insight] : insight and judgment were intact

## 2019-09-25 NOTE — H&P ADULT - CLICK TO LAUNCH ORM
[FreeTextEntry1] : GIA is a carmen 67 year old patient who presented today in follow up for a history of right breast DCIS.  \par She has been doing well with no new breast related complaints.  \par Mammographic imaging of the right breast was done recently which again revealed probably benign calcifications in the right breast (BIRADS 3), as detailed above. \par MRI imaging was also performed recently which revealed stable postsurgical changes of the right breast. No MR evidence of malignancy in either breast, as detailed above.\par Physical exam was unrevealing today.\par \par Imaging with a bilateral diagnostic mammogram will be due in February 2020, and that will be scheduled today.\par All questions answered. She knows to call with any concerns. \par She will return for follow-up and clinical breast exam in six months with Dr. Reid.\par She will continue follow-up with medical oncology as well. 
.

## 2019-09-26 ENCOUNTER — FORM ENCOUNTER (OUTPATIENT)
Age: 59
End: 2019-09-26

## 2019-09-27 ENCOUNTER — OUTPATIENT (OUTPATIENT)
Dept: OUTPATIENT SERVICES | Facility: HOSPITAL | Age: 59
LOS: 1 days | End: 2019-09-27
Payer: COMMERCIAL

## 2019-09-27 ENCOUNTER — APPOINTMENT (OUTPATIENT)
Dept: CT IMAGING | Facility: CLINIC | Age: 59
End: 2019-09-27
Payer: COMMERCIAL

## 2019-09-27 DIAGNOSIS — Z98.890 OTHER SPECIFIED POSTPROCEDURAL STATES: Chronic | ICD-10-CM

## 2019-09-27 DIAGNOSIS — Z00.00 ENCOUNTER FOR GENERAL ADULT MEDICAL EXAMINATION WITHOUT ABNORMAL FINDINGS: ICD-10-CM

## 2019-09-27 DIAGNOSIS — J15.9 UNSPECIFIED BACTERIAL PNEUMONIA: ICD-10-CM

## 2019-09-27 DIAGNOSIS — Z90.49 ACQUIRED ABSENCE OF OTHER SPECIFIED PARTS OF DIGESTIVE TRACT: Chronic | ICD-10-CM

## 2019-09-27 PROCEDURE — 71250 CT THORAX DX C-: CPT

## 2019-09-27 PROCEDURE — 71250 CT THORAX DX C-: CPT | Mod: 26

## 2019-10-04 ENCOUNTER — MEDICATION RENEWAL (OUTPATIENT)
Age: 59
End: 2019-10-04

## 2019-10-08 ENCOUNTER — APPOINTMENT (OUTPATIENT)
Dept: ELECTROPHYSIOLOGY | Facility: CLINIC | Age: 59
End: 2019-10-08
Payer: COMMERCIAL

## 2019-10-08 ENCOUNTER — NON-APPOINTMENT (OUTPATIENT)
Age: 59
End: 2019-10-08

## 2019-10-08 VITALS
BODY MASS INDEX: 28.62 KG/M2 | HEART RATE: 98 BPM | OXYGEN SATURATION: 95 % | SYSTOLIC BLOOD PRESSURE: 110 MMHG | HEIGHT: 74 IN | DIASTOLIC BLOOD PRESSURE: 70 MMHG | WEIGHT: 223 LBS

## 2019-10-08 PROCEDURE — 93000 ELECTROCARDIOGRAM COMPLETE: CPT

## 2019-10-08 PROCEDURE — 99214 OFFICE O/P EST MOD 30 MIN: CPT | Mod: 25

## 2019-10-08 RX ORDER — AMIODARONE HYDROCHLORIDE 200 MG/1
200 TABLET ORAL
Qty: 90 | Refills: 0 | Status: DISCONTINUED | COMMUNITY
Start: 1900-01-01 | End: 2019-10-08

## 2019-10-10 NOTE — DISCUSSION/SUMMARY
[FreeTextEntry1] : Volodymyr Garcia is a 58 year old male / at [facility] with GERD, provoked DVT, atrial flutter/atrial fibrillation (on Apixaban) who presents for follow up of his AF/AFL.\par \par He continues to remain in rate controlled atrial flutter. Though he does not have severe symptoms, he continues to have complaints of fatigue which were not present prior to having AF. \par \par We will discontinue Amiodarone today.  He will return for ECG in 2-3 weeks to evaluate arrhythmia.  If necessary can increase Metoprolol dosing. \par \par In the interim he has the desire to undergo a screening colonoscopy and kidney stone treatment. \par Ultimately will schedule repeat ablation.  Post ablation can plan for uninterrupted anticoagulation. \par \par Leslee Tejada ANP-C

## 2019-10-10 NOTE — PHYSICAL EXAM
[General Appearance - Well Developed] : well developed [Well Groomed] : well groomed [Normal Appearance] : normal appearance [General Appearance - In No Acute Distress] : no acute distress [General Appearance - Well Nourished] : well nourished [No Deformities] : no deformities [Respiration, Rhythm And Depth] : normal respiratory rhythm and effort [Auscultation Breath Sounds / Voice Sounds] : lungs were clear to auscultation bilaterally [Exaggerated Use Of Accessory Muscles For Inspiration] : no accessory muscle use [Irregularly Irregular] : the rhythm was irregularly irregular [Abdomen Soft] : soft [Abdomen Tenderness] : non-tender [Abdomen Mass (___ Cm)] : no abdominal mass palpated [Abnormal Walk] : normal gait [Nail Clubbing] : no clubbing of the fingernails [Gait - Sufficient For Exercise Testing] : the gait was sufficient for exercise testing [Cyanosis, Localized] : no localized cyanosis [] : no ischemic changes [Petechial Hemorrhages (___cm)] : no petechial hemorrhages [Oriented To Time, Place, And Person] : oriented to person, place, and time [Affect] : the affect was normal [Mood] : the mood was normal [No Anxiety] : not feeling anxious

## 2019-10-10 NOTE — HISTORY OF PRESENT ILLNESS
[FreeTextEntry1] : Volodymyr Garcia is a 58 year old male / at [facility] with GERD, provoked DVT, atrial flutter/atrial fibrillation (on Apixaban) who presents for follow up of his AF/AFL.\par \par Old Note: To summarize his history, he was first seen in our system in early 2019 when he presented to the ER in typical atrial flutter. During atrial flutter and atrial fibrillation he felt he "had the flu." He underwent MARINA/CV with successful restoration of normal rhythm. After the cardioversion, his symptoms improved dramatically. However, later developed atrial fibrillation. He underwent catheter ablation of AF and atrial flutter by Dr. Aaron on 6/27/19. His post-operative course was complicated by chest discomfort and bilateral lower lobe pneumonias.In this setting, he had early recurrence of AF. He had an attempted cardioversion on 7/1/19 which failed to restore normal rhythm despite using Ibutilide. He was loaded with amiodarone.and repeat CV on 7/3/19 was successful in restoring normal rhythm. He recently saw Dr. Aguirre in follow up in July at which time he was noted to be back in AF. At that time, he also had complaints of bronchitis and so was given a steroid taper with improvement.\par \par Today presenting EKG atrial flutter 97bpm.  He denies symptoms.  He denies SHIRLEY, SOB, chest pain, palpitations, dizziness, lightheadedness, syncope or near syncope.

## 2019-10-15 ENCOUNTER — OUTPATIENT (OUTPATIENT)
Dept: OUTPATIENT SERVICES | Facility: HOSPITAL | Age: 59
LOS: 1 days | End: 2019-10-15
Payer: COMMERCIAL

## 2019-10-15 DIAGNOSIS — Z90.49 ACQUIRED ABSENCE OF OTHER SPECIFIED PARTS OF DIGESTIVE TRACT: Chronic | ICD-10-CM

## 2019-10-15 DIAGNOSIS — Z98.890 OTHER SPECIFIED POSTPROCEDURAL STATES: Chronic | ICD-10-CM

## 2019-10-15 DIAGNOSIS — G47.33 OBSTRUCTIVE SLEEP APNEA (ADULT) (PEDIATRIC): ICD-10-CM

## 2019-10-15 PROCEDURE — 95806 SLEEP STUDY UNATT&RESP EFFT: CPT | Mod: 26

## 2019-10-15 PROCEDURE — 95806 SLEEP STUDY UNATT&RESP EFFT: CPT

## 2019-10-15 PROCEDURE — G0399: CPT

## 2019-10-18 ENCOUNTER — MEDICATION RENEWAL (OUTPATIENT)
Age: 59
End: 2019-10-18

## 2019-10-19 NOTE — ED STATDOCS - NS_ATTENDINGSCRIBE_ED_ALL_ED
Satisfactory
I personally performed the service described in the documentation recorded by the scribe in my presence, and it accurately and completely records my words and actions.

## 2019-11-05 ENCOUNTER — APPOINTMENT (OUTPATIENT)
Dept: ELECTROPHYSIOLOGY | Facility: CLINIC | Age: 59
End: 2019-11-05
Payer: COMMERCIAL

## 2019-11-05 VITALS
WEIGHT: 225 LBS | OXYGEN SATURATION: 99 % | HEART RATE: 112 BPM | BODY MASS INDEX: 28.88 KG/M2 | DIASTOLIC BLOOD PRESSURE: 75 MMHG | HEIGHT: 74 IN | SYSTOLIC BLOOD PRESSURE: 124 MMHG

## 2019-11-05 DIAGNOSIS — R53.83 OTHER FATIGUE: ICD-10-CM

## 2019-11-05 PROCEDURE — 99214 OFFICE O/P EST MOD 30 MIN: CPT | Mod: 25

## 2019-11-05 PROCEDURE — 93000 ELECTROCARDIOGRAM COMPLETE: CPT

## 2019-11-05 RX ORDER — METOPROLOL TARTRATE 50 MG/1
50 TABLET, FILM COATED ORAL
Qty: 60 | Refills: 3 | Status: DISCONTINUED | COMMUNITY
Start: 1900-01-01 | End: 2019-11-05

## 2019-11-05 NOTE — HISTORY OF PRESENT ILLNESS
[FreeTextEntry1] : 57 y/o M maldonado/ at [facility] with GERD, provoked DVT, and symptomatic atrial flutter/atrial fibrillation s/p RF AF ablation 6/27/2019 (PVI and CTI, Dr. Aaron) with early recurrent s/p failed DCCV (7/1/2019) despite Ibutilide. Was started on Amiodarone and underwent successfully cardioversion (7/3), but ultimately found to be back in subsequent AFib follow-up (7/23/2019).  He continued to remained in AF/atypical atrial flutter beyond the 3 month blanking period despite AAT.  Amiodarone was discontinued at last f/up 10/8/2019 with goals to plan for repeat ablation.  Remains on Metoprolol 50mg BID.\par Presents today to assess rate control in atrial flutter. \par \par Today presenting EKG atrial flutter (likely atypical) @ 112pm.  He denies severe symptoms up has ongoing complaints of fatigue..  He denies SHIRLEY, SOB, chest pain, palpitations, dizziness, lightheadedness, syncope or near syncope.

## 2019-11-05 NOTE — PHYSICAL EXAM
[General Appearance - Well Developed] : well developed [Normal Appearance] : normal appearance [Well Groomed] : well groomed [General Appearance - Well Nourished] : well nourished [No Deformities] : no deformities [General Appearance - In No Acute Distress] : no acute distress [Respiration, Rhythm And Depth] : normal respiratory rhythm and effort [Exaggerated Use Of Accessory Muscles For Inspiration] : no accessory muscle use [Auscultation Breath Sounds / Voice Sounds] : lungs were clear to auscultation bilaterally [Heart Sounds] : normal S1 and S2 [Murmurs] : no murmurs present [Edema] : no peripheral edema present [Tachycardic ___] : the heart rate was tachycardic at [unfilled] bpm [Irregularly Irregular] : the rhythm was irregularly irregular [Abdomen Soft] : soft [Abdomen Tenderness] : non-tender [Abdomen Mass (___ Cm)] : no abdominal mass palpated [Abnormal Walk] : normal gait [Gait - Sufficient For Exercise Testing] : the gait was sufficient for exercise testing [Nail Clubbing] : no clubbing of the fingernails [Cyanosis, Localized] : no localized cyanosis [Petechial Hemorrhages (___cm)] : no petechial hemorrhages [] : no rash [Oriented To Time, Place, And Person] : oriented to person, place, and time [Affect] : the affect was normal [Mood] : the mood was normal [No Anxiety] : not feeling anxious

## 2019-11-05 NOTE — DISCUSSION/SUMMARY
[FreeTextEntry1] : 57 y/o M maldonado/ at [facility] with GERD, provoked DVT, and symptomatic atrial flutter/atrial fibrillation s/p RF AF ablation 6/27/2019 (PVI and CTI, Dr. Aaron) with recurrent AF and Atypical AFlutter despite DCCV and AAT with Amiodarone. Amiodarone which was discontinued at last f/up 10/8/2019 with goals for short term rate control and ultimately repeat ablation. \par Presents today in AFlutter @ 112bpm. Ongoing complaints of fatigue but otherwise asymptomatic.\par \par - Continue a/c with Eliquis which he is tolerating without bleeding complications\par - Increase Metoprolol to Toprol XL 200mg QD\par - Advised to monitor HR and symptoms at home\par - Will undergo routine screening colonoscopy and kidney stone treatment prior to procedure\par - Plan for repeat ablation in upcoming months \par \par Seen and d/w Dr. Pat\par Mable Boucher PAC\par

## 2019-11-13 NOTE — ED STATDOCS - NS_ATTENDINGSCRIBE_ED_ALL_ED
abnormal/expand...
I personally performed the service described in the documentation recorded by the scribe in my presence, and it accurately and completely records my words and actions.

## 2019-11-25 ENCOUNTER — APPOINTMENT (OUTPATIENT)
Dept: PULMONOLOGY | Facility: CLINIC | Age: 59
End: 2019-11-25
Payer: COMMERCIAL

## 2019-11-25 VITALS
SYSTOLIC BLOOD PRESSURE: 118 MMHG | OXYGEN SATURATION: 97 % | DIASTOLIC BLOOD PRESSURE: 76 MMHG | WEIGHT: 221 LBS | HEIGHT: 74 IN | HEART RATE: 70 BPM | BODY MASS INDEX: 28.36 KG/M2

## 2019-11-25 DIAGNOSIS — R91.1 SOLITARY PULMONARY NODULE: ICD-10-CM

## 2019-11-25 DIAGNOSIS — J15.9 UNSPECIFIED BACTERIAL PNEUMONIA: ICD-10-CM

## 2019-11-25 DIAGNOSIS — G47.33 OBSTRUCTIVE SLEEP APNEA (ADULT) (PEDIATRIC): ICD-10-CM

## 2019-11-25 PROCEDURE — 99214 OFFICE O/P EST MOD 30 MIN: CPT

## 2019-11-25 RX ORDER — FLUTICASONE PROPIONATE 50 UG/1
50 SPRAY, METERED NASAL DAILY
Qty: 1 | Refills: 0 | Status: DISCONTINUED | COMMUNITY
Start: 2019-07-23 | End: 2019-11-25

## 2019-12-05 ENCOUNTER — APPOINTMENT (OUTPATIENT)
Dept: CARDIOLOGY | Facility: CLINIC | Age: 59
End: 2019-12-05

## 2019-12-19 ENCOUNTER — APPOINTMENT (OUTPATIENT)
Dept: CARDIOLOGY | Facility: CLINIC | Age: 59
End: 2019-12-19
Payer: COMMERCIAL

## 2019-12-19 ENCOUNTER — NON-APPOINTMENT (OUTPATIENT)
Age: 59
End: 2019-12-19

## 2019-12-19 VITALS
HEART RATE: 80 BPM | HEIGHT: 74 IN | BODY MASS INDEX: 28.36 KG/M2 | WEIGHT: 221 LBS | OXYGEN SATURATION: 95 % | SYSTOLIC BLOOD PRESSURE: 112 MMHG | DIASTOLIC BLOOD PRESSURE: 69 MMHG

## 2019-12-19 PROCEDURE — 93000 ELECTROCARDIOGRAM COMPLETE: CPT

## 2019-12-19 PROCEDURE — 99214 OFFICE O/P EST MOD 30 MIN: CPT

## 2020-01-16 ENCOUNTER — RX RENEWAL (OUTPATIENT)
Age: 60
End: 2020-01-16

## 2020-01-27 NOTE — PHYSICAL EXAM
[No Deformities] : no deformities [General Appearance - In No Acute Distress] : no acute distress [Normal Conjunctiva] : the conjunctiva exhibited no abnormalities [Normal Oral Mucosa] : normal oral mucosa [No Oral Pallor] : no oral pallor [Normal Jugular Venous V Waves Present] : normal jugular venous V waves present [Normal Oropharynx] : normal oropharynx [Respiration, Rhythm And Depth] : normal respiratory rhythm and effort [Arterial Pulses Normal] : the arterial pulses were normal [Heart Sounds] : normal S1 and S2 [Auscultation Breath Sounds / Voice Sounds] : lungs were clear to auscultation bilaterally [Edema] : no peripheral edema present [Bowel Sounds] : normal bowel sounds [Abdomen Soft] : soft [Veins - Varicosity Changes] : no varicosital changes were noted in the lower extremities [Gait - Sufficient For Exercise Testing] : the gait was sufficient for exercise testing [Abdomen Tenderness] : non-tender [Abnormal Walk] : normal gait [Skin Color & Pigmentation] : normal skin color and pigmentation [Nail Clubbing] : no clubbing of the fingernails [Petechial Hemorrhages (___cm)] : no petechial hemorrhages [Cyanosis, Localized] : no localized cyanosis [Skin Turgor] : normal skin turgor [Oriented To Time, Place, And Person] : oriented to person, place, and time [Impaired Insight] : insight and judgment were intact [FreeTextEntry1] : IRRR.

## 2020-01-27 NOTE — ADDENDUM
[FreeTextEntry1] : 1/27/2020\par . Volodymyr Garcia, is planning to have lithotripsy and colonoscopy in February. He is optimized from a cardiac standpoint for his scheduled procedure. He may hold Eliquis 48 hours prior to the procedure and resume the next day when clinically stable. Plan d/w Dr. Aguirre. \par \par BHAVIN Noel.

## 2020-01-27 NOTE — HISTORY OF PRESENT ILLNESS
[FreeTextEntry1] : 60 y/o M maldonado/ at [facility] with GERD, provoked DVT, and symptomatic atrial flutter/atrial fibrillation s/p RF AF ablation 6/27/2019 (PVI and CTI, Dr. Aaron) with early recurrent s/p failed DCCV (7/1/2019) despite Ibutilide. \par Patient was subsequently cardioverted.  He remains in atrial flutter with controlled ventricular rates at most times and is on anticoagulation.  He is asymptomatic at this time.  He has seen EP last month.  Metoprolol has been increased.  Patient denies having any chest pain, palpitation, syncope or presyncope.\par Today on EKG, atrial flutter with variable AV block, most likely atypical atrial flutter.  No significant ST-T changes.

## 2020-01-27 NOTE — DISCUSSION/SUMMARY
[FreeTextEntry1] : Patient has better heart rate control on current dose of metoprolol.\par Blood pressure is to goal\par Patient is anticoagulation without any side effects at this time.  No bleeding issues.\par He is to follow with EP, plan to perform flutter ablation in the near future.\par Advised to increase physical activity. Patient was advised to partake in 150 minutes of moderate exercise per week.\par Provoked DVT, on anticoagulation.

## 2020-01-27 NOTE — REVIEW OF SYSTEMS
[Recent Weight Loss (___ Lbs)] : recent [unfilled] ~Ulb weight loss [Eyeglasses] : currently wearing eyeglasses [Headache] : no headache [Recent Weight Gain (___ Lbs)] : no recent weight gain [Feeling Fatigued] : not feeling fatigued [Blurry Vision] : no blurred vision [Eye Pain] : no eye pain [Earache] : no earache [Discharge From The Ears] : no discharge from the ears [Mouth Sores] : no mouth sores [Sore Throat] : no sore throat [Dyspnea on exertion] : not dyspnea during exertion [Chest Pain] : no chest pain [Lower Ext Edema] : no extremity edema [Palpitations] : no palpitations [Cough] : no cough [Wheezing] : no wheezing [Nausea] : no nausea [Vomiting] : no vomiting [Heartburn] : no heartburn [Change in Appetite] : no change in appetite [Change In The Stool] : no change in stool [Urinary Frequency] : no change in urinary frequency [Hematuria] : no hematuria [Impotence] : no impotence [Nocturia] : no nocturia [Joint Pain] : no joint pain [Joint Swelling] : no joint swelling [Joint Stiffness] : no joint stiffness [Muscle Cramps] : no muscle cramps [Limb Weakness (Paresis)] : no limb weakness [Skin: A Rash] : no rash: [Skin Lesions] : no skin lesions [Dizziness] : no dizziness [Convulsions] : no convulsions [Confusion] : no confusion was observed [Anxiety] : no anxiety [Excessive Thirst] : no polydipsia [Easy Bleeding] : no tendency for easy bleeding [Swollen Glands] : no swollen glands [Easy Bruising] : no tendency for easy bruising

## 2020-01-31 ENCOUNTER — APPOINTMENT (OUTPATIENT)
Dept: CARDIOLOGY | Facility: CLINIC | Age: 60
End: 2020-01-31
Payer: COMMERCIAL

## 2020-01-31 ENCOUNTER — NON-APPOINTMENT (OUTPATIENT)
Age: 60
End: 2020-01-31

## 2020-01-31 VITALS
OXYGEN SATURATION: 95 % | DIASTOLIC BLOOD PRESSURE: 64 MMHG | WEIGHT: 220 LBS | SYSTOLIC BLOOD PRESSURE: 110 MMHG | BODY MASS INDEX: 28.23 KG/M2 | HEIGHT: 74 IN | HEART RATE: 87 BPM

## 2020-01-31 DIAGNOSIS — N40.1 BENIGN PROSTATIC HYPERPLASIA WITH LOWER URINARY TRACT SYMPMS: ICD-10-CM

## 2020-01-31 PROCEDURE — 99214 OFFICE O/P EST MOD 30 MIN: CPT

## 2020-01-31 PROCEDURE — 93000 ELECTROCARDIOGRAM COMPLETE: CPT

## 2020-01-31 RX ORDER — METOPROLOL SUCCINATE 100 MG/1
100 TABLET, EXTENDED RELEASE ORAL DAILY
Qty: 90 | Refills: 1 | Status: DISCONTINUED | COMMUNITY
Start: 2020-01-17 | End: 2020-01-31

## 2020-03-15 ENCOUNTER — TRANSCRIPTION ENCOUNTER (OUTPATIENT)
Age: 60
End: 2020-03-15

## 2020-03-18 ENCOUNTER — APPOINTMENT (OUTPATIENT)
Dept: CARDIOLOGY | Facility: CLINIC | Age: 60
End: 2020-03-18
Payer: COMMERCIAL

## 2020-03-18 ENCOUNTER — TRANSCRIPTION ENCOUNTER (OUTPATIENT)
Age: 60
End: 2020-03-18

## 2020-03-18 VITALS
DIASTOLIC BLOOD PRESSURE: 83 MMHG | BODY MASS INDEX: 27.98 KG/M2 | WEIGHT: 218 LBS | HEART RATE: 104 BPM | OXYGEN SATURATION: 97 % | TEMPERATURE: 97.5 F | SYSTOLIC BLOOD PRESSURE: 116 MMHG | HEIGHT: 74 IN

## 2020-03-18 DIAGNOSIS — I48.19 OTHER PERSISTENT ATRIAL FIBRILLATION: ICD-10-CM

## 2020-03-18 PROCEDURE — 93000 ELECTROCARDIOGRAM COMPLETE: CPT

## 2020-03-18 PROCEDURE — 99214 OFFICE O/P EST MOD 30 MIN: CPT

## 2020-03-20 NOTE — ADDENDUM
[FreeTextEntry1] : pt seen and examined. plan of care dw pt\par RVR with afib. on 400 mg of toprol\par added low dose cardizem\par FU with EP prior to ablation.

## 2020-03-20 NOTE — PHYSICAL EXAM
[No Deformities] : no deformities [General Appearance - In No Acute Distress] : no acute distress [Normal Conjunctiva] : the conjunctiva exhibited no abnormalities [Normal Oral Mucosa] : normal oral mucosa [No Oral Pallor] : no oral pallor [Normal Oropharynx] : normal oropharynx [Normal Jugular Venous V Waves Present] : normal jugular venous V waves present [Respiration, Rhythm And Depth] : normal respiratory rhythm and effort [Auscultation Breath Sounds / Voice Sounds] : lungs were clear to auscultation bilaterally [Heart Sounds] : normal S1 and S2 [Arterial Pulses Normal] : the arterial pulses were normal [Edema] : no peripheral edema present [Veins - Varicosity Changes] : no varicosital changes were noted in the lower extremities [Bowel Sounds] : normal bowel sounds [Abdomen Soft] : soft [Abdomen Tenderness] : non-tender [Abnormal Walk] : normal gait [Gait - Sufficient For Exercise Testing] : the gait was sufficient for exercise testing [Nail Clubbing] : no clubbing of the fingernails [Cyanosis, Localized] : no localized cyanosis [Petechial Hemorrhages (___cm)] : no petechial hemorrhages [Skin Color & Pigmentation] : normal skin color and pigmentation [Skin Turgor] : normal skin turgor [Oriented To Time, Place, And Person] : oriented to person, place, and time [Impaired Insight] : insight and judgment were intact [FreeTextEntry1] : IRRR.

## 2020-03-20 NOTE — DISCUSSION/SUMMARY
[Patient] : the patient [Risks] : risks [Benefits] : benefits [FreeTextEntry1] : Mr. Volodymry Rizzo  walked into the office complaining of chest pressure at night, denies any discomfort when walking around. Pt. states he flew 3 weeks ago and has been treated by PCP for URI with antibiotic, steroids and yesterday prescribed z-pack. \par Today Aflutter on EKG, 101 bpm, denies any chest pain, SOB, palpitations, syncope or near syncope at this time.  He is on metoprolol 200 mg BID. He suppose to have afib/flutter ablation yesterday due to in light of the current COVID 19 pandemic, it is rescheduled for 3/28/2020. \par Seen and examined by Dr. Aguirre. \par \par PLAN:\par \par 1. Afib/a-flutter with variable AV block on EKG today, VR = 101. Currently on metoprolol succinate 200 mg BID, start Cardizem  120 mg daily. On Eliquis with no bleeding episodes\par 2.Blood pressure is to goal\par 4. Provoked DVT, on anticoagulation.\par 5. Will have attended Sleep Study soon; home Sleep Study with mild JENNIFER according to patient\par 6. Advised to hydrate well. \par 7. He is to follow with EP,  plan to perform flutter ablation on 3/28/2020.\par 8. Advised him to notify office with any compliance and concerns. \par 9. f/u with Dr. Aguirre as scheduled.\par \par BHAVIN Noel.

## 2020-03-20 NOTE — REVIEW OF SYSTEMS
[Eyeglasses] : currently wearing eyeglasses [Negative] : Cardiovascular [Headache] : no headache [Recent Weight Gain (___ Lbs)] : no recent weight gain [Feeling Fatigued] : not feeling fatigued [Recent Weight Loss (___ Lbs)] : no recent weight loss [Blurry Vision] : no blurred vision [Eye Pain] : no eye pain [Earache] : no earache [Discharge From The Ears] : no discharge from the ears [Mouth Sores] : no mouth sores [Sore Throat] : no sore throat [Shortness Of Breath] : no shortness of breath [Dyspnea on exertion] : not dyspnea during exertion [Chest  Pressure] : no chest pressure [Chest Pain] : no chest pain [Lower Ext Edema] : no extremity edema [Leg Claudication] : no intermittent leg claudication [Palpitations] : no palpitations [Cough] : no cough [Wheezing] : no wheezing [Nausea] : no nausea [Vomiting] : no vomiting [Heartburn] : no heartburn [Change in Appetite] : no change in appetite [Change In The Stool] : no change in stool [Urinary Frequency] : no change in urinary frequency [Hematuria] : no hematuria [Impotence] : no impotence [Nocturia] : no nocturia [Joint Pain] : no joint pain [Joint Swelling] : no joint swelling [Joint Stiffness] : no joint stiffness [Muscle Cramps] : no muscle cramps [Limb Weakness (Paresis)] : no limb weakness [Skin: A Rash] : no rash: [Skin Lesions] : no skin lesions [Dizziness] : no dizziness [Convulsions] : no convulsions [Confusion] : no confusion was observed [Anxiety] : no anxiety [Excessive Thirst] : no polydipsia [Easy Bleeding] : no tendency for easy bleeding [Swollen Glands] : no swollen glands [Easy Bruising] : no tendency for easy bruising

## 2020-03-20 NOTE — HISTORY OF PRESENT ILLNESS
[FreeTextEntry1] : 60 y/o M maldonado/ at [facility] with GERD, provoked DVT, and symptomatic atrial flutter/atrial fibrillation s/p RF AF ablation 6/27/2019 (PVI and CTI, Dr. Aaron) with early recurrent s/p failed DCCV (7/1/2019) despite Ibutilide. \par Patient was subsequently cardioverted.  He remains in atrial flutter with controlled ventricular rates at most times and is on anticoagulation.  He is asymptomatic at this time.  He has seen EP last month.  Metoprolol has been increased.  Patient denies having any chest pain, palpitation, syncope or presyncope.\par Today on EKG, atrial flutter with variable AV block, most likely atypical atrial flutter.  No significant ST-T changes.\par \par 1/31/2020\par Here for f/u visit. Was scheduled for Laser Lithotripsy yesterday but got cancelled due to Afib/a-flutter with RVR (HR = 115-135). Reports of feeling well, but has occasional palpitations. Denies chest pain, shortness of breath, fatigue, syncope or near syncope, orthopnea and PND. Compliant with medications. No bleeding episodes from AC. No activity intolerance. No leg edema. \par \par 3/18/2020\par Mr. Volodymyr Rizzo  walked into the office complaining of chest pressure at night, denies any discomfort when walking around. Pt. states he flew 3 weeks ago and has been treated by PCP for URI with antibiotic, steroids and yesterday prescribed z-pack. \par Today Aflutter on EKG, 101 bpm, denies any chest pain, SOB, palpitations, syncope or near syncope at this time.  He is on metoprolol 200 mg BID. He suppose to have afib/flutter ablation yesterday due to in light of the current COVID 19 pandemic, it is rescheduled for 3/28/2020. \par

## 2020-04-25 ENCOUNTER — MESSAGE (OUTPATIENT)
Age: 60
End: 2020-04-25

## 2020-05-04 ENCOUNTER — APPOINTMENT (OUTPATIENT)
Dept: DISASTER EMERGENCY | Facility: HOSPITAL | Age: 60
End: 2020-05-04

## 2020-05-05 LAB
SARS-COV-2 IGG SERPL IA-ACNC: <0.1 INDEX
SARS-COV-2 IGG SERPL QL IA: NEGATIVE

## 2020-06-02 ENCOUNTER — APPOINTMENT (OUTPATIENT)
Dept: ELECTROPHYSIOLOGY | Facility: CLINIC | Age: 60
End: 2020-06-02

## 2020-06-02 NOTE — HISTORY OF PRESENT ILLNESS
[FreeTextEntry1] : Mr. Garcia is a 59 year old male who presents today for EP follow up for management of atrial arrhythmias. \par \par History significant for GERD, provoked DVT, and symptomatic atrial flutter/ atrial fibrillation s/p RF AF ablation 6/27/19 ( PVI and CTI, Dr. Aaron) with early recurrent AF s/p failed DCCV 7/1/19 despite Ibutilide. Was started on Amiodarone and underwent successfully cardioversion (7/3/19), but ultimately found to be back in subsequent AFib follow-up (7/23/2019. In January 2020 he was scheduled for laser lithotripsy but was postponed due to AF/AFL with RVR (115-135 bpm). Metoprolol was titrated up to 200mg BID, and cardizem added 3/20 for rate control.  JENNIFER evaluation performed which revealed mild JENNIFER. Repeat  AF ablation was planned, but postponed due to COVID precautions. \par \par Since last follow up______________. \par \par INCOMPLETE NOTE, PATIENT NOT YET SEEN

## 2020-06-26 ENCOUNTER — RX RENEWAL (OUTPATIENT)
Age: 60
End: 2020-06-26

## 2020-07-02 ENCOUNTER — APPOINTMENT (OUTPATIENT)
Dept: CARDIOLOGY | Facility: CLINIC | Age: 60
End: 2020-07-02

## 2020-07-22 ENCOUNTER — APPOINTMENT (OUTPATIENT)
Dept: CARDIOLOGY | Facility: CLINIC | Age: 60
End: 2020-07-22
Payer: COMMERCIAL

## 2020-07-22 ENCOUNTER — NON-APPOINTMENT (OUTPATIENT)
Age: 60
End: 2020-07-22

## 2020-07-22 VITALS
HEART RATE: 67 BPM | DIASTOLIC BLOOD PRESSURE: 70 MMHG | WEIGHT: 210 LBS | TEMPERATURE: 98.3 F | SYSTOLIC BLOOD PRESSURE: 113 MMHG | HEIGHT: 74 IN | OXYGEN SATURATION: 98 % | BODY MASS INDEX: 26.95 KG/M2

## 2020-07-22 DIAGNOSIS — Z86.79 OTHER SPECIFIED POSTPROCEDURAL STATES: ICD-10-CM

## 2020-07-22 DIAGNOSIS — Z98.890 OTHER SPECIFIED POSTPROCEDURAL STATES: ICD-10-CM

## 2020-07-22 DIAGNOSIS — I82.441 ACUTE EMBOLISM AND THROMBOSIS OF RIGHT TIBIAL VEIN: ICD-10-CM

## 2020-07-22 PROCEDURE — 93000 ELECTROCARDIOGRAM COMPLETE: CPT

## 2020-07-22 PROCEDURE — 99214 OFFICE O/P EST MOD 30 MIN: CPT

## 2020-07-22 NOTE — ASSESSMENT
[FreeTextEntry1] : pt with unprovoked DVT on AC\par Rate controlled with flutter, meds renewed\par Plan for EP evaluation\par No bleeding issues with AC\par Repeat TTE with aortic root dilation and afib/flutter. \par patient was advised to see us in 6 months if stable and certainly earlier with any new symptoms.\par

## 2020-07-22 NOTE — PHYSICAL EXAM
[No Deformities] : no deformities [General Appearance - In No Acute Distress] : no acute distress [Normal Conjunctiva] : the conjunctiva exhibited no abnormalities [No Oral Pallor] : no oral pallor [Normal Jugular Venous V Waves Present] : normal jugular venous V waves present [Respiration, Rhythm And Depth] : normal respiratory rhythm and effort [Auscultation Breath Sounds / Voice Sounds] : lungs were clear to auscultation bilaterally [Heart Sounds] : normal S1 and S2 [Arterial Pulses Normal] : the arterial pulses were normal [Edema] : no peripheral edema present [FreeTextEntry1] : IRRR. [Bowel Sounds] : normal bowel sounds [Abdomen Soft] : soft [Abdomen Tenderness] : non-tender [Abnormal Walk] : normal gait [Gait - Sufficient For Exercise Testing] : the gait was sufficient for exercise testing [Nail Clubbing] : no clubbing of the fingernails [Petechial Hemorrhages (___cm)] : no petechial hemorrhages [Skin Color & Pigmentation] : normal skin color and pigmentation [Skin Turgor] : normal skin turgor [Oriented To Time, Place, And Person] : oriented to person, place, and time [Impaired Insight] : insight and judgment were intact [Affect] : the affect was normal

## 2020-07-22 NOTE — HISTORY OF PRESENT ILLNESS
[FreeTextEntry1] : 59-year-old male with history of provoked DVT, symptomatic atrial flutter, status post RF ablation for A. fib on 6/27/2019 with pulmonary vein isolation and CTI performed by Dr. Baldwin with recurrent failed DC cardioversion afterwards despite ibutilide.  He remains in atrial flutter.  He is on high dose of metoprolol and last visit we added Cardizem.  We advised him to see EP for further recommendation on ambulation.  He feels well denies having any chest pain.  He does get short of breath when he lifts something heavy above his head.  He denies any syncope or presyncope.\par \par His EKG shows atrial flutter, mean ventricular rate is 69 bpm, nonspecific ST changes.

## 2020-07-22 NOTE — REVIEW OF SYSTEMS
[Headache] : no headache [Recent Weight Gain (___ Lbs)] : no recent weight gain [Feeling Fatigued] : not feeling fatigued [Recent Weight Loss (___ Lbs)] : no recent weight loss [Blurry Vision] : no blurred vision [Eye Pain] : no eye pain [Eyeglasses] : currently wearing eyeglasses [Earache] : no earache [Discharge From The Ears] : no discharge from the ears [Mouth Sores] : no mouth sores [Sore Throat] : no sore throat [Dyspnea on exertion] : dyspnea during exertion [Chest Pain] : no chest pain [Lower Ext Edema] : no extremity edema [Palpitations] : no palpitations [Cough] : no cough [Wheezing] : no wheezing [Nausea] : no nausea [Vomiting] : no vomiting [Heartburn] : no heartburn [Change in Appetite] : no change in appetite [Change In The Stool] : no change in stool [Urinary Frequency] : no change in urinary frequency [Hematuria] : no hematuria [Impotence] : no impotence [Nocturia] : no nocturia [Joint Pain] : no joint pain [Joint Swelling] : no joint swelling [Joint Stiffness] : no joint stiffness [Limb Weakness (Paresis)] : no limb weakness [Muscle Cramps] : no muscle cramps [Skin: A Rash] : no rash: [Skin Lesions] : no skin lesions [Dizziness] : no dizziness [Tremor] : no tremor was seen [Numbness (Hypesthesia)] : no numbness [Convulsions] : no convulsions [Confusion] : no confusion was observed [Memory Lapses Or Loss] : no memory lapses or loss [Anxiety] : no anxiety [Excessive Thirst] : no polydipsia [Easy Bleeding] : no tendency for easy bleeding [Swollen Glands] : no swollen glands [Easy Bruising] : no tendency for easy bruising

## 2020-08-17 ENCOUNTER — APPOINTMENT (OUTPATIENT)
Dept: CARDIOLOGY | Facility: CLINIC | Age: 60
End: 2020-08-17
Payer: COMMERCIAL

## 2020-08-17 PROCEDURE — 93306 TTE W/DOPPLER COMPLETE: CPT

## 2020-08-24 NOTE — ED ADULT NURSE NOTE - NSFALLRSKASSESASSIST_ED_ALL_ED
Per fax there was no changes to dosage, sig or quantity.  The medication(s) are set up as pending and waiting for your approval.  Preferred pharmacy was set up and verified.  
Refill requested for:   montelukast (SINGULAIR) 10 MG tablet 30 tablet 3 4/21/2020     Sig - Route: Take 1 tablet by mouth every evening. - Oral      Last office visit: 02/21/2020    Medication refilled per protocol.  
no

## 2020-09-08 ENCOUNTER — APPOINTMENT (OUTPATIENT)
Dept: ELECTROPHYSIOLOGY | Facility: CLINIC | Age: 60
End: 2020-09-08
Payer: COMMERCIAL

## 2020-09-08 ENCOUNTER — NON-APPOINTMENT (OUTPATIENT)
Age: 60
End: 2020-09-08

## 2020-09-08 VITALS
TEMPERATURE: 96.9 F | DIASTOLIC BLOOD PRESSURE: 71 MMHG | HEIGHT: 74 IN | HEART RATE: 57 BPM | SYSTOLIC BLOOD PRESSURE: 109 MMHG | WEIGHT: 225 LBS | OXYGEN SATURATION: 96 % | BODY MASS INDEX: 28.88 KG/M2

## 2020-09-08 DIAGNOSIS — Z98.890 OTHER SPECIFIED POSTPROCEDURAL STATES: ICD-10-CM

## 2020-09-08 DIAGNOSIS — Z86.79 OTHER SPECIFIED POSTPROCEDURAL STATES: ICD-10-CM

## 2020-09-08 PROCEDURE — 93000 ELECTROCARDIOGRAM COMPLETE: CPT

## 2020-09-08 PROCEDURE — 99214 OFFICE O/P EST MOD 30 MIN: CPT

## 2020-09-08 NOTE — PHYSICAL EXAM
[General Appearance - Well Nourished] : well nourished [General Appearance - Well Developed] : well developed [Auscultation Breath Sounds / Voice Sounds] : lungs were clear to auscultation bilaterally [Respiration, Rhythm And Depth] : normal respiratory rhythm and effort [] : no respiratory distress [Heart Sounds] : normal S1 and S2 [Arterial Pulses Normal] : the arterial pulses were normal [Murmurs] : no murmurs present [Irregularly Irregular] : the rhythm was irregularly irregular [Edema] : no peripheral edema present

## 2020-09-08 NOTE — DISCUSSION/SUMMARY
[FreeTextEntry1] : Volodymyr Garcia is a 59 year old male / at [facility] with GERD, provoked DVT, atrial flutter/atrial fibrillation (on Apixaban) who presents for follow up of his AF/AFL.\par \par To summarize his history, he was first seen in our system in early 2019 when he presented to the ER in typical atrial flutter. During atrial flutter and atrial fibrillation he felt he "had the flu." He underwent MARINA/CV with successful restoration of normal rhythm. After the cardioversion, his symptoms improved dramatically. However, later developed atrial fibrillation. He underwent catheter ablation of AF and atrial flutter by Dr. Aaron on 6/27/19. His post-operative course was complicated by chest discomfort and bilateral lower lobe pneumonias.In this setting, he had early recurrence of AF. He had an attempted cardioversion on 7/1/19 which failed to restore normal rhythm despite using Ibutilide. He was loaded with amiodarone.and repeat CV on 7/3/19 was successful in restoring normal rhythm. He recently saw Dr. Aguirre in follow up in July at which time he was noted to be back in atypical atrial flutter. At that time, he also had complaints of bronchitis and so was given a steroid taper with improvement. Amiodarone was discontinued with plans to repeat ablation. This was not yet performed. He has continued to note mild SHIRLEY, and has required high dosages of a/v kwame blocking agents to achieve adequate rate control (metoprolol 200mg BID, and Diltiazem 120mg daily). \par \par Recommendation:\par \par We again discussed rhythm control strategies including catheter ablation for atypical atrial flutter. The risks and benefits of AF ablation, including procedure related risks such as bleeding, vascular injury, stroke, MI, cardiac perforation, and esophageal injury were reviewed. Patient is agreeable to move forward. Will arrange after endoscopy/colonoscopy due to the need to interrupt anticoagulation for these procedures.\par CT to be arranged prior to ablation. MARINA to be considered if recent interruption in anticoagulation.\par \par Anahi Tanner ANP-C \par \par

## 2020-09-08 NOTE — REVIEW OF SYSTEMS
[Dyspnea on exertion] : dyspnea during exertion [Headache] : no headache [Feeling Fatigued] : not feeling fatigued [Shortness Of Breath] : no shortness of breath [Chest  Pressure] : no chest pressure [Chest Pain] : no chest pain [Lower Ext Edema] : no extremity edema [Dizziness] : no dizziness [Easy Bleeding] : no tendency for easy bleeding [Easy Bruising] : no tendency for easy bruising

## 2020-09-08 NOTE — HISTORY OF PRESENT ILLNESS
[FreeTextEntry1] : Volodymyr Garcia is a 59 year old male / at [facility] with GERD, provoked DVT, atrial flutter/atrial fibrillation (on Apixaban) who presents for follow up of his AF/AFL.\par \par To summarize his history, he was first seen in our system in early 2019 when he presented to the ER in typical atrial flutter. During atrial flutter and atrial fibrillation he felt he "had the flu." He underwent MARINA/CV with successful restoration of normal rhythm. After the cardioversion, his symptoms improved dramatically. However, later developed atrial fibrillation. He underwent catheter ablation of AF and atrial flutter by Dr. Aaron on 6/27/19. His post-operative course was complicated by chest discomfort and bilateral lower lobe pneumonias.In this setting, he had early recurrence of AF. He had an attempted cardioversion on 7/1/19 which failed to restore normal rhythm despite using Ibutilide. He was loaded with amiodarone.and repeat CV on 7/3/19 was successful in restoring normal rhythm. He recently saw Dr. Aguirre in follow up in July at which time he was noted to be back in atypical atrial flutter. At that time, he also had complaints of bronchitis and so was given a steroid taper with improvement. Amiodarone was discontinued with plans to repeat ablation. This was not yet performed, was supposed to have endoscopy/colnoscopy but these were deferred due to COVID precautions. He has continued to note mild SHIRLEY, and has required high dosages of a/v kwame blocking agents to achieve adequate rate control (metoprolol 200mg BID, and Diltiazem 120mg daily).

## 2020-09-24 LAB
ANION GAP SERPL CALC-SCNC: 8 MMOL/L
BUN SERPL-MCNC: 20 MG/DL
CALCIUM SERPL-MCNC: 9.4 MG/DL
CHLORIDE SERPL-SCNC: 105 MMOL/L
CO2 SERPL-SCNC: 26 MMOL/L
CREAT SERPL-MCNC: 1.2 MG/DL
GLUCOSE SERPL-MCNC: 105 MG/DL
POTASSIUM SERPL-SCNC: 4.2 MMOL/L
SODIUM SERPL-SCNC: 139 MMOL/L

## 2020-10-02 ENCOUNTER — OUTPATIENT (OUTPATIENT)
Dept: OUTPATIENT SERVICES | Facility: HOSPITAL | Age: 60
LOS: 1 days | End: 2020-10-02
Payer: COMMERCIAL

## 2020-10-02 ENCOUNTER — RESULT REVIEW (OUTPATIENT)
Age: 60
End: 2020-10-02

## 2020-10-02 DIAGNOSIS — I48.4 ATYPICAL ATRIAL FLUTTER: ICD-10-CM

## 2020-10-02 DIAGNOSIS — Z98.890 OTHER SPECIFIED POSTPROCEDURAL STATES: Chronic | ICD-10-CM

## 2020-10-02 DIAGNOSIS — Z90.49 ACQUIRED ABSENCE OF OTHER SPECIFIED PARTS OF DIGESTIVE TRACT: Chronic | ICD-10-CM

## 2020-10-02 PROCEDURE — 75572 CT HRT W/3D IMAGE: CPT

## 2020-10-02 PROCEDURE — 75572 CT HRT W/3D IMAGE: CPT | Mod: 26

## 2020-10-08 ENCOUNTER — EMERGENCY (EMERGENCY)
Facility: HOSPITAL | Age: 60
LOS: 1 days | Discharge: HOME | End: 2020-10-08
Attending: EMERGENCY MEDICINE
Payer: COMMERCIAL

## 2020-10-08 VITALS
WEIGHT: 220.02 LBS | TEMPERATURE: 98 F | HEART RATE: 95 BPM | OXYGEN SATURATION: 97 % | RESPIRATION RATE: 18 BRPM | SYSTOLIC BLOOD PRESSURE: 140 MMHG | HEIGHT: 74 IN | DIASTOLIC BLOOD PRESSURE: 78 MMHG

## 2020-10-08 DIAGNOSIS — Z98.890 OTHER SPECIFIED POSTPROCEDURAL STATES: Chronic | ICD-10-CM

## 2020-10-08 DIAGNOSIS — Z90.49 ACQUIRED ABSENCE OF OTHER SPECIFIED PARTS OF DIGESTIVE TRACT: Chronic | ICD-10-CM

## 2020-10-08 PROCEDURE — 96372 THER/PROPH/DIAG INJ SC/IM: CPT

## 2020-10-08 PROCEDURE — 99284 EMERGENCY DEPT VISIT MOD MDM: CPT

## 2020-10-08 PROCEDURE — 99283 EMERGENCY DEPT VISIT LOW MDM: CPT | Mod: 25

## 2020-10-08 RX ORDER — LIDOCAINE 4 G/100G
1 CREAM TOPICAL ONCE
Refills: 0 | Status: COMPLETED | OUTPATIENT
Start: 2020-10-08 | End: 2020-10-08

## 2020-10-08 RX ORDER — DIAZEPAM 5 MG
1 TABLET ORAL
Qty: 6 | Refills: 0
Start: 2020-10-08 | End: 2020-10-10

## 2020-10-08 RX ORDER — DEXAMETHASONE 0.5 MG/5ML
10 ELIXIR ORAL ONCE
Refills: 0 | Status: COMPLETED | OUTPATIENT
Start: 2020-10-08 | End: 2020-10-08

## 2020-10-08 RX ORDER — LIDOCAINE 4 G/100G
1 CREAM TOPICAL
Qty: 4 | Refills: 0
Start: 2020-10-08 | End: 2020-10-11

## 2020-10-08 RX ORDER — DIAZEPAM 5 MG
5 TABLET ORAL ONCE
Refills: 0 | Status: DISCONTINUED | OUTPATIENT
Start: 2020-10-08 | End: 2020-10-08

## 2020-10-08 RX ADMIN — Medication 10 MILLIGRAM(S): at 13:12

## 2020-10-08 RX ADMIN — Medication 5 MILLIGRAM(S): at 13:13

## 2020-10-08 RX ADMIN — LIDOCAINE 1 PATCH: 4 CREAM TOPICAL at 13:12

## 2020-10-08 NOTE — ED PROVIDER NOTE - PATIENT PORTAL LINK FT
You can access the FollowMyHealth Patient Portal offered by [facility] by registering at the following website: [email or web address]. By joining [email or web address] FollowMyHealth portal, you will also be able to view your health information using other applications (apps) compatible with our system.

## 2020-10-08 NOTE — ED PROVIDER NOTE - OBJECTIVE STATEMENT
PT with SPMHX of A-fib (on anti-coagulation) presents to the ED with complaint of lower back pain that started today. Pt states that he had a gradual onset of pain while at work today. Pt states that he was cutting something when he started to feel a pain he describes ans cramping of his muscle that has been constat non radiating moderate in nature made worse with activity, PT states that they have not done anything for the pain prior to coming to the ED. PT denies fever, chills, numbness tingling, loss of sensation saddle anesthesia, weakness, HE, loss of control of urine, or bowels IVDA.

## 2020-10-08 NOTE — ED PROVIDER NOTE - ADDITIONAL NOTES AND INSTRUCTIONS:
PT was evaluated At [facility] ED and was found to have a condition that warranted time of to rest and heal from WORK/SCHOOL.   Fermin Llamas PA-C

## 2020-10-08 NOTE — ED PROVIDER NOTE - NSFOLLOWUPCLINICS_GEN_ALL_ED_FT
[facility] - [facility]  Ortho/Spine  [address] [place] [id number]  Phone: [phone]  Fax:   Follow Up Time:

## 2020-10-08 NOTE — ED PROVIDER NOTE - CLINICAL SUMMARY MEDICAL DECISION MAKING FREE TEXT BOX
PT with stable VS, no acute distress, non toxic appearing, tolerating PO in the ED, Pt Neuro vasc intact, no red flags for back pian, pt to be dc home with pain meds, supportive care, follow up to spinal clinic, educated about when to return to the ED if needed. PT verbalizes that he understands all instructions and results. Pt informed that ED is open and available 24/7 365 days a yr, encouraged to return to the ED if they have any change in condition, or feel the need for revaluation.

## 2020-10-08 NOTE — ED PROVIDER NOTE - NS CPE EDP MUSC LUMBAR LOC
BL paraspinal ttp, at L5, spasm of muscles. no midline ttp, VALE, strength intact, no palpable cm abnormality.

## 2020-10-08 NOTE — ED PROVIDER NOTE - NSFOLLOWUPINSTRUCTIONS_ED_ALL_ED_FT
Patient education: Low back pain in adults (The Basics)  View in French  Written by the doctors and editors at [facility]  How worried should I be about low back pain?  Do not assume the worst. Almost everyone gets back pain at some point. Low back pain can be scary. But even when the pain is severe, it usually goes away on its own within a few weeks. The cases that require urgent care or surgery are rare.    See your doctor or nurse if you have back pain and you:    ?Recently had a fall or an injury to your back    ?Have numbness or weakness in your legs    ?Have problems with bladder or bowel control    ?Have unexplained weight loss    ?Have a fever or feel sick in other ways    ?Take steroid medicine, such as prednisone, on a regular basis    ?Have diabetes or a medical problem that weakens your immune system    ?Have a history of cancer or osteoporosis    You should also see a doctor if:    ?Your back pain is so severe that you cannot perform simple tasks    ?Your back pain does not start to improve within 4 weeks    What are the parts of the back?  The back is made up of (figure 1):    ?Vertebrae – A stack of bones that sit on top of one another like a stack of coins. Each of these bones has a hole in the center. When stacked, the bones form a hollow tube that protects the spinal cord.    ?Discs – Rubbery discs sit in between each of the vertebrae to add cushion and allow movement.    ?Spinal cord and nerves – The spinal cord is the highway of nerves that connects the brain to the rest of the body. It runs through the vertebrae within the spinal canal. Nerves branch from the spinal cord and pass in between the vertebrae. From there they connect to the arms, the legs, and the organs. (This is why problems in the back can cause leg pain or bladder or bowel problems.)    ?Muscles, tendons, and ligaments – Together the muscles, tendons, and ligaments are called the "soft tissues" of the back. These soft tissues support the back and help hold it together.    What causes low back pain?  Many different things can cause low back pain. Most of the time, doctors do not know the exact cause.    Back pain can happen if you strain a muscle. This is often what has happened when a person "throws out" their back. This refers to pain that starts suddenly after physical activity, like lifting something heavy or bending the back.    Back pain can also happen if you have:    ?Damaged, bulging, or torn discs    ?Arthritis affecting the joints of the spine    ?Bony growths on the vertebrae that crowd nearby nerves    ?A vertebra out of place    ?Narrowing in the spinal canal    ?A tumor or infection (but this is very rare)    Should I get an imaging test?  Most people do not need an imaging test such an X-ray, CT scan, or MRI. Most cases of back pain go away a few weeks. Doctors usually do not order imaging tests unless there are signs of something unusual.    If your doctor does not order an imaging test, do not worry. They can still learn a lot about your pain just from looking you over and talking with you.    How can the doctor or nurse tell what is wrong just by talking to me?  Your symptoms tell your doctor or nurse a lot about the cause of your pain. For example:    ?If your pain started after you did something specific, like lifting a heavy object or twisting your back, you might have strained a muscle.    ?If your pain spreads down the back of one thigh, it could be a sign that one of the nerves that go to your leg is being pinched by a bulging or torn disc.    ?If your pain goes all the way down both legs, it could be a sign that you have a narrowed spinal canal. This is most often due to bony growths on your spine.    How is back pain treated?  Most people with an episode of low back pain do not have a serious medical problem, and can try simple treatments such as:    ?Staying active – The best thing you can do is to stay as active as possible. People with low back pain recover faster if they stay active. If your pain is severe, you might need to rest for a day or 2. But it's important to get back to walking and moving as soon as possible. While you should avoid heavy lifting and sports while your back hurts, try to keep doing your normal daily activities.    ?Heat – Some people find that it helps to use a heating pad or heated wrap. Be careful to avoid high heat settings to prevent skin burns.    ?Medicines – First, you can try pain medicines that you can get without a prescription. In many cases, doctors suggest first trying a nonsteroidal antiinflammatory drug, or "NSAID." NSAIDs include ibuprofen (sample brand names: Advil, Motrin) and naproxen (sample brand name: Aleve). These might work better than acetaminophen (Tylenol) for back pain.    If non-prescription medicines do not help, let your doctor or nurse know. In some cases, doctors prescribe a medicine to relax the muscles (called a "muscle relaxant"). But keep in mind that muscle relaxants are not generally used in people older than 65. In older people, these medicines can cause side effects such as trouble urinating or confusion.    ?Treatments to help with symptoms – Some treatments might help you feel better for a little while. They include:    •Spinal manipulation – This is when a chiropractor, physical therapist, or other professional moves or "adjusts" the joints of your back. If you want to try this, talk to your doctor or nurse first.    •Acupuncture – This is when someone who knows traditional Chinese medicine inserts tiny needles into your body to block pain signals.    •Massage    While back pain usually goes away within a few weeks, some people do continue to have pain for longer. In this case, additional treatments might include:    ?Self care – This involves being aware of your pain. While you should rest when you need to, it's important to stay active as much as you can. Things like applying heat and doing gentle stretches can help you feel better, too.    ?Physical therapy – A physical therapist is an exercise expert who can teach you stretches and movements to help strengthen your muscles. The goal is to relieve pain but also help you get back to your normal activities.    Exercises you can try include walking, swimming, or using an exercise bike. Some people also find that Wa[employer] or yoga can help with their back pain. Finding activities you enjoy can help you stay active.    ?Reducing stress – Some people find that it helps to try something called "mindfulness-based stress reduction." This involves going to a group program to practice relaxation and meditation. If your back pain is making you feel anxious or depressed, talk to your doctor or nurse. There are other treatments that can help with these problems.    Some people wonder if injections (shots) can help to relieve back pain. In some cases, doctors might recommend a shot of medicine to numb the area or reduce swelling. But this has only been proven to work in specific situations.    Only a small number of people end up needing surgery to treat back pain.    What can I do to keep from getting back pain again?  The best thing you can do is to stay active. Doing exercises to strengthen and stretch your back can help. You can also:    ?Learn to lift using your legs instead of your back    ?Avoid sitting or standing in the same position for too long    Having back pain can be frustrating and scary. But it can help to know that doing these things can lower your risk of having another episode.

## 2020-10-08 NOTE — ED PROVIDER NOTE - NS ED ROS FT
ROS: CONTUSIONAL: Denies fever, chills, fatigue, wt loss. HEAD: Denies trauma, HA, Dizziness. EYE: Denies Acute visual changes, diplopia. ENMT: Denies change in hearing, tinnitus, epistaxis, difficulty swallowing, sore throat. CARDIO: Denies CP, palpitations, edema. RESP: Denies Cough, SOB , Diff breathing, hemoptysis. GI: Denies N/V, ABD pain, change in bowel movement. URINARY: Denies difficulty urinating, pelvic pain. MS: +back pain,  Denies joint pain,  weakness, decreased ROM, swelling. NEURO: Denies change in gait, seizures, loss of sensation, dizziness, confusion LOC.  PSY: NO SI/HI.

## 2020-12-02 ENCOUNTER — APPOINTMENT (OUTPATIENT)
Dept: CARDIOLOGY | Facility: CLINIC | Age: 60
End: 2020-12-02
Payer: COMMERCIAL

## 2020-12-02 VITALS
HEART RATE: 60 BPM | TEMPERATURE: 97.7 F | HEIGHT: 74 IN | OXYGEN SATURATION: 96 % | SYSTOLIC BLOOD PRESSURE: 106 MMHG | DIASTOLIC BLOOD PRESSURE: 68 MMHG | BODY MASS INDEX: 29 KG/M2 | WEIGHT: 226 LBS

## 2020-12-02 PROCEDURE — 93000 ELECTROCARDIOGRAM COMPLETE: CPT | Mod: NC

## 2020-12-02 PROCEDURE — 99214 OFFICE O/P EST MOD 30 MIN: CPT

## 2020-12-02 PROCEDURE — 99072 ADDL SUPL MATRL&STAF TM PHE: CPT

## 2020-12-03 ENCOUNTER — NON-APPOINTMENT (OUTPATIENT)
Age: 60
End: 2020-12-03

## 2021-01-11 ENCOUNTER — NON-APPOINTMENT (OUTPATIENT)
Age: 61
End: 2021-01-11

## 2021-01-21 ENCOUNTER — APPOINTMENT (OUTPATIENT)
Dept: CARDIOLOGY | Facility: CLINIC | Age: 61
End: 2021-01-21
Payer: COMMERCIAL

## 2021-01-21 VITALS
RESPIRATION RATE: 17 BRPM | HEART RATE: 73 BPM | DIASTOLIC BLOOD PRESSURE: 82 MMHG | BODY MASS INDEX: 29 KG/M2 | WEIGHT: 226 LBS | HEIGHT: 74 IN | OXYGEN SATURATION: 97 % | SYSTOLIC BLOOD PRESSURE: 135 MMHG | TEMPERATURE: 97.8 F

## 2021-01-21 PROCEDURE — 99072 ADDL SUPL MATRL&STAF TM PHE: CPT

## 2021-01-21 PROCEDURE — 99214 OFFICE O/P EST MOD 30 MIN: CPT

## 2021-01-21 PROCEDURE — 93000 ELECTROCARDIOGRAM COMPLETE: CPT

## 2021-01-21 NOTE — REVIEW OF SYSTEMS
[Headache] : no headache [Recent Weight Gain (___ Lbs)] : no recent weight gain [Feeling Fatigued] : not feeling fatigued [Recent Weight Loss (___ Lbs)] : no recent weight loss [Blurry Vision] : no blurred vision [Eye Pain] : no eye pain [Eyeglasses] : currently wearing eyeglasses [Earache] : no earache [Discharge From The Ears] : no discharge from the ears [Mouth Sores] : no mouth sores [Sore Throat] : no sore throat [Dyspnea on exertion] : dyspnea during exertion [Chest Pain] : no chest pain [Lower Ext Edema] : no extremity edema [Palpitations] : no palpitations [Cough] : no cough [Wheezing] : no wheezing [Nausea] : no nausea [Vomiting] : no vomiting [Heartburn] : no heartburn [Change in Appetite] : no change in appetite [Change In The Stool] : no change in stool [Urinary Frequency] : no change in urinary frequency [Hematuria] : no hematuria [Impotence] : no impotence [Nocturia] : no nocturia [Joint Swelling] : no joint swelling [Joint Pain] : no joint pain [Joint Stiffness] : no joint stiffness [Muscle Cramps] : no muscle cramps [Limb Weakness (Paresis)] : no limb weakness [Skin: A Rash] : no rash: [Skin Lesions] : no skin lesions [Dizziness] : no dizziness [Tremor] : no tremor was seen [Numbness (Hypesthesia)] : no numbness [Convulsions] : no convulsions [Confusion] : no confusion was observed [Memory Lapses Or Loss] : no memory lapses or loss [Anxiety] : no anxiety [Excessive Thirst] : no polydipsia [Easy Bleeding] : no tendency for easy bleeding [Swollen Glands] : no swollen glands [Easy Bruising] : no tendency for easy bruising

## 2021-01-21 NOTE — PHYSICAL EXAM
[No Deformities] : no deformities [General Appearance - In No Acute Distress] : no acute distress [Normal Conjunctiva] : the conjunctiva exhibited no abnormalities [No Oral Pallor] : no oral pallor [Normal Jugular Venous V Waves Present] : normal jugular venous V waves present [Respiration, Rhythm And Depth] : normal respiratory rhythm and effort [Auscultation Breath Sounds / Voice Sounds] : lungs were clear to auscultation bilaterally [Chest Palpation] : palpation of the chest revealed no abnormalities [Arterial Pulses Normal] : the arterial pulses were normal [Heart Sounds] : normal S1 and S2 [Edema] : no peripheral edema present [FreeTextEntry1] : IRRR. [Bowel Sounds] : normal bowel sounds [Abdomen Soft] : soft [Abnormal Walk] : normal gait [Gait - Sufficient For Exercise Testing] : the gait was sufficient for exercise testing [Nail Clubbing] : no clubbing of the fingernails [Petechial Hemorrhages (___cm)] : no petechial hemorrhages [Skin Color & Pigmentation] : normal skin color and pigmentation [Skin Turgor] : normal skin turgor [Oriented To Time, Place, And Person] : oriented to person, place, and time [Impaired Insight] : insight and judgment were intact [Affect] : the affect was normal

## 2021-01-21 NOTE — ASSESSMENT
[FreeTextEntry1] : Patient with atrial flutter.\par Rate controlled with higher dose of Toprol as well as Cardizem 120 mg daily.\par He is on anticoagulation.\par Plan for ablation.\par Since he was off anticoagulation he will need a MARINA.  I spoke with Dr. Pat who will arrange for the procedure.\par No change in medications at this time.\par He will see his urologist as well.  I gave him refill on tamsulosin today.\par patient was advised to see us in 6 months if stable and certainly earlier with any new symptoms.\par

## 2021-03-08 ENCOUNTER — APPOINTMENT (OUTPATIENT)
Dept: SURGERY | Facility: CLINIC | Age: 61
End: 2021-03-08
Payer: COMMERCIAL

## 2021-03-08 VITALS
OXYGEN SATURATION: 92 % | HEART RATE: 96 BPM | WEIGHT: 222 LBS | DIASTOLIC BLOOD PRESSURE: 74 MMHG | SYSTOLIC BLOOD PRESSURE: 125 MMHG | HEIGHT: 74 IN | BODY MASS INDEX: 28.49 KG/M2

## 2021-03-08 PROCEDURE — 99214 OFFICE O/P EST MOD 30 MIN: CPT

## 2021-03-08 PROCEDURE — 99072 ADDL SUPL MATRL&STAF TM PHE: CPT

## 2021-03-08 NOTE — DATA REVIEWED
[FreeTextEntry1] : Independent review of the ultrasound report reveals a 1.6 cm stone in the gallbladder without gallbladder wall thickening or fluid

## 2021-03-08 NOTE — ASSESSMENT
[FreeTextEntry1] : The patient is a 60 year old male with right upper abdominal pain, gallstones, and a known umbilical hernia.  He at this point was advised that given the onset of the stabbing pain in the right upper abdominal area, he will benefit from a cholecystectomy and open umbilical hernia repair.  The risks, benefits, and alternatives including the option of doing nothing to a robotic, possible laparoscopic cholecystectomy, possible open cholecystectomy were discussed.  The potential complications including but not limited to infection, bleeding, bile leak, bowel injury and/or obstruction, and possible bile duct injury were discussed.  The patient understands and wishes to proceed at the next available time.\par

## 2021-03-08 NOTE — PHYSICAL EXAM
[JVD] : no jugular venous distention  [Abdominal Masses] : No abdominal masses [Abdomen Tenderness] : ~T ~M No abdominal tenderness [No Rash or Lesion] : No rash or lesion [Purpura] : no purpura  [Petechiae] : no petechiae [Skin Ulcer] : no ulcer [Skin Induration] : no induration [Alert] : alert [Oriented to Person] : oriented to person [Oriented to Place] : oriented to place [Oriented to Time] : oriented to time [Calm] : calm [de-identified] : non toxic, in no acute distress [de-identified] : NC/AT PERRL EOMI no scleral icterus [de-identified] : trachea midline with no gross mass  [de-identified] : no audible wheezing or stridor [de-identified] : the abdomen remains protuberant, soft, tenderness to deep palpation of the right upper quadrant, no guarding, no rebound, no masses  [de-identified] : FROM of all extremities with no gross deformity or angulation, there remains a stable reducible umbilical hernia, there are no signs of ventral hernia, there is no evidence of recurrent inguinal hernias [de-identified] : a lower midline scar is noted and inguinal incisions consistent with the patient's past surgical history [de-identified] : mood is calm

## 2021-03-08 NOTE — HISTORY OF PRESENT ILLNESS
[de-identified] : The patient returns to the office with the onset of right upper abdominal pain that is worse following meals.  He states that the pain seems to be localized to the area just under the right ribs.  The pain is a sharp stabbing pain that radiates into the lower right chest area.  He has associated bloating and nausea.  He also states that with the onset of this pain the umbilical hernia is becoming more uncomfortable.

## 2021-03-22 ENCOUNTER — NON-APPOINTMENT (OUTPATIENT)
Age: 61
End: 2021-03-22

## 2021-03-22 ENCOUNTER — APPOINTMENT (OUTPATIENT)
Dept: CARDIOLOGY | Facility: CLINIC | Age: 61
End: 2021-03-22
Payer: COMMERCIAL

## 2021-03-22 VITALS
HEART RATE: 70 BPM | TEMPERATURE: 96.9 F | OXYGEN SATURATION: 98 % | SYSTOLIC BLOOD PRESSURE: 113 MMHG | WEIGHT: 232 LBS | BODY MASS INDEX: 29.77 KG/M2 | HEIGHT: 74 IN | DIASTOLIC BLOOD PRESSURE: 75 MMHG

## 2021-03-22 DIAGNOSIS — Z01.810 ENCOUNTER FOR PREPROCEDURAL CARDIOVASCULAR EXAMINATION: ICD-10-CM

## 2021-03-22 PROCEDURE — 99214 OFFICE O/P EST MOD 30 MIN: CPT | Mod: 25

## 2021-03-22 PROCEDURE — 99072 ADDL SUPL MATRL&STAF TM PHE: CPT

## 2021-03-22 PROCEDURE — 93000 ELECTROCARDIOGRAM COMPLETE: CPT

## 2021-03-30 NOTE — CARDIOLOGY SUMMARY
[facility] Internal Medicine   Progress Note              Subjective:  Called by RN patient more agitated today, pulling at lines, etc.  Also notice bloody urine, no garrido in place, bladder scan yield of 800cc  Urology consulted, requested garrido placement and bladder irrigation.   Currently bloody urine in catheter. Pt has no complaints  AO to self, hospital and president but not which hospital or what year         Review of Systems:   3/29 pt in OR  3/28  Constitutional: no fatigue, fever or chills.  Skin: No rash, bruising or discoloration  Eyes: No vision changes, no pain, erythema or drainage.    ENT: No change in hearing, no sore throat or trouble swallowing, no nasal discharge   Endocrine: no polyuria, polydipsia, heat or cold intolerance.  Cardiovascular: No chest pain, chest heaviness or palpitations  Respiratory: No cough, shortness of breath, or wheezing  Gastrointestinal: No nausea, vomiting or diarrhea, no constipation. No abdominal pain, melena or hematochezia  Musculoskeletal: No joint swelling or stiffness, no muscle soreness.    Neurologic: No headache, no numbness or weakness.   Hematologic: No unusual bruising or bleeding  Psychiatric: No SI/HI AH/VHI/O's    Intake/Output Summary (Last 24 hours) at 3/30/2021 1418  Last data filed at 3/30/2021 0300  Gross per 24 hour   Intake 1375 ml   Output 2400 ml   Net -1025 ml           ALLERGIES:  Patient has no known allergies.     Hospital Meds  Current Facility-Administered Medications   Medication Dose Route Frequency Provider Last Rate Last Admin   • tamsulosin (FLOMAX) capsule 0.4 mg  0.4 mg Oral Daily NITO Rowell CNP   0.4 mg at 03/30/21 0505   • metoPROLOL tartrate (LOPRESSOR) tablet 50 mg  50 mg Oral 2 times per day Gaby Taarngo MD   50 mg at 03/30/21 0830   • lisinopril (ZESTRIL) tablet 30 mg  30 mg Oral Daily Daryn Casey MD   30 mg at 03/30/21 0830   • sitaGLIPtin (JANUVIA) tablet 50 mg  50 mg Oral Daily Daryn Casey  [No Ischemia] : no Ischemia MD   50 mg at 03/30/21 0835   • aspirin suppository 300 mg  300 mg Rectal Daily Daryn Casey MD   300 mg at 03/29/21 2118   • enoxaparin (LOVENOX) injection 40 mg  40 mg Subcutaneous Q Evening Daryn Casey MD       • mupirocin (BACTROBAN) 2 % ointment 1 application  1 application Each Nare 2 times per day Daryn Casey MD   1 application at 03/29/21 2119   • sodium chloride 0.9 % flush bag 25 mL  25 mL Intravenous PRN Daryn Casey MD       • sodium chloride (PF) 0.9 % injection 2 mL  2 mL Intracatheter 2 times per day Daryn Casey MD   2 mL at 03/30/21 0835   • HYDROcodone-acetaminophen (NORCO) 5-325 MG per tablet 1 tablet  1 tablet Oral Q4H PRN Daryn Casey MD   1 tablet at 03/30/21 1200   • polyethylene glycol (MIRALAX) packet 17 g  17 g Oral Daily PRN Daryn Casey MD       • sodium chloride 0.9% infusion   Intravenous Continuous Daryn Casey MD 75 mL/hr at 03/30/21 0828 Restarted at 03/30/21 0828   • aspirin (ECOTRIN) enteric coated tablet 81 mg  81 mg Oral Daily Gaby Tarango MD   81 mg at 03/30/21 0831   • [Held by provider] clopidogrel (PLAVIX) tablet 75 mg  75 mg Oral Daily Tariq Stewart MD       • sodium chloride 0.9% infusion   Intravenous Continuous Gaby Tarango MD 75 mL/hr at 03/29/21 0059 New Bag at 03/29/21 0059   • amLODIPine (NORVASC) tablet 10 mg  10 mg Oral Daily Gaby Tarango MD   10 mg at 03/30/21 0831   • hydrALAZINE (APRESOLINE) injection 10 mg  10 mg Intravenous Q6H PRN Gaby Tarango MD   10 mg at 03/29/21 1757   • dextrose 50 % injection 25 g  25 g Intravenous PRN Gaby Tarango MD       • dextrose 50 % injection 12.5 g  12.5 g Intravenous PRN Gaby Tarango MD       • glucagon (GLUCAGEN) injection 1 mg  1 mg Intramuscular PRN Gaby Tarango MD       • dextrose (GLUTOSE) 40 % gel 15 g  15 g Oral PRN Gaby Tarango MD       • dextrose (GLUTOSE) 40 % gel 30 g  30 g Oral PRN Gaby Tarango MD       • insulin lispro (ADMELOG,HumaLOG)  [___] : [unfilled] [LVEF ___%] : LVEF [unfilled]% scheduled AND correction dose   Subcutaneous TID WC Gaby Tarango MD   2 Units at 03/30/21 0839   • insulin lispro (ADMELOG,HumaLOG) scheduled AND correction dose   Subcutaneous Nightly Gaby Tarango MD       • atorvastatin (LIPITOR) tablet 40 mg  40 mg Oral Daily Gaby Tarango MD   40 mg at 03/30/21 0831   • insulin glargine (LANTUS) injection 10 Units  10 Units Subcutaneous Nightly Gaby Tarango MD   10 Units at 03/29/21 2117   • acetaminophen (TYLENOL) tablet 650 mg  650 mg Oral Q4H PRN Selma Munoz MD   650 mg at 03/28/21 1550        Last Recorded Vitals      SpO2 Readings from Last 3 Encounters:   03/30/21 98%   03/03/21 99%   01/18/21 99%        VITAL SIGNS:     Vital Last Value 24 Hour Range   Temperature 98.1 °F (36.7 °C) (03/30/21 0824) Temp  Min: 97.3 °F (36.3 °C)  Max: 98.1 °F (36.7 °C)   Pulse 82 (03/30/21 1000) Pulse  Min: 65  Max: 97   Respiratory 18 (03/30/21 1200) Resp  Min: 12  Max: 18   Non-Invasive  Blood Pressure (!) 174/85 (03/30/21 0824) BP  Min: 149/76  Max: 180/82   Pulse Oximetry 98 % (03/30/21 0824) SpO2  Min: 97 %  Max: 100 %     Vital Today Admitted   Weight 72.3 kg (159 lb 6.3 oz) (03/26/21 2300) Weight: 72.3 kg (159 lb 6.3 oz) (03/26/21 2300)   Height N/A Height: 5' 9\" (175.3 cm) (03/26/21 2300)   BMI N/A BMI (Calculated): 23.54 (03/26/21 2300)       Physical Exam:  General: Alert and oriented to self, hospital, and president, no acute distress, comfortable, c collar in place,  Eyes: no scleral icterus, no conjunctival erythema , EOMI, PERRLA  Cardio: RRR, S1, S2, no murmur, rub, gallop or thrills noted, No chest wall tenderness  Pulm: Lungs clear to auscultation bilaterally, no wheeze, rhonchi or crackles  GI: Soft, non-tender, nondistended. Bowel sounds present.  : bloody urine in garrido No suprapubic tenderness, no CVA tenderness bilaterally  Ext: No upper or lower extremity edema. Acyanotic, warm to touch, pulses palpable.    Musculoskeletal: . moves all 4 extremities. No  [Enlarged] : enlarged LA size joint effusions.  Skin: Warm, dry,  no rashes,   Psych: flat affect. Responds appropriately, follows commands  Neuro: CN 2-12 grossly in tact,  3/5 strength RLE, 5/5 LLE, 4/5 RUE, 5/5 LUE.     Labs     Recent Labs   Lab 03/30/21  0606 03/29/21  0539 03/28/21  0811 03/26/21  2238   SODIUM 138 140 140 140   POTASSIUM 4.3 3.6 3.7 3.8   CHLORIDE 108* 110* 108* 111*   CO2 21 25 27 26   BUN 15 16 18 18   CREATININE 0.89 0.94 1.10 1.03   GLUCOSE 207* 142* 185* 134*   ALBUMIN  --   --   --  2.8*   AST  --   --   --  16   BILIRUBIN  --   --   --  0.6       Recent Labs   Lab 03/30/21  1136 03/30/21  0606 03/29/21  0539 03/28/21  0811   WBC  --  12.4* 5.4 5.7   RBC  --  4.89 4.21* 3.91*   HGB 12.6* 14.5 12.6* 11.8*   HCT 36.7* 43.4 36.8* 34.5*   PLT  --  278 248 253           Imaging    XR CERVICAL SPINE 1 VIEW   Final Result       Status post C5-C6 ACDF, as above.         Electronically Signed by: BISI BOONE M.D.    Signed on: 3/29/2021 4:25 PM          XR CERVICAL SPINE 1 VIEW   Final Result   Impression: A single lateral intraoperative radiograph of the cervical   spine demonstrates a surgical instrument projecting over the C5-C6   intervertebral disc space.      Electronically Signed by: SABIHA PRICE MD    Signed on: 3/29/2021 4:20 PM          CT CERVICAL SPINE WO CONTRAST   Final Result   Addendum 1 of 1   Addendum:      There is a small acute horizontal non-displaced fracture in the left   superior aspect of the C6 vertebral body adjacent to the uncovertebral   joint (series [id number] images [id number] and series [id number] image 57).      Ossification of the anterior longitudinal ligament can be seen with DISH.      Electronically Signed by: SABIHA PRICE MD    Signed on: 3/28/2021 3:59 PM          Final      1. No acute fracture in the cervical spine.       2. Multilevel cervical degenerative disc and joint disease. No significant   central spinal canal stenosis. Bilateral neural foraminal stenoses most   pronounced at C3-C4.       Electronically Signed by: SABIHA PRICE MD    Signed on: 3/28/2021 3:51 PM          US Carotid Duplex   Final Result   No evidence of hemodynamically significant internal carotid stenosis.      Electronically Signed by: SASHA PORTILLO MD    Signed on: 3/28/2021 11:11 AM          CT ABDOMEN PELVIS WO CONTRAST    (Results Pending)       Cultures  Microbiology Results     None             Assessment/Plan:    Hyperextension C5/C6 injury with disc fluid signal:  -C-collar for spinal stability  -NSGY on consult - continued recommendations appreciated  -  maintain C Collar until cleared by NSGY  - s/p ACDF C5C6 - Dr Casey - cleared to dc per NSGY standpoint    Hematuria  - garrido in place  - repeat H&H - stable  - urology on consult - continued recs appreciated     left hemisphere/left basal ganglia several punctate acute strokes  -Continue ASA 81 mg daily PO  -statin, BP control  - neuro checks  - neurology consulted - continued recs appreciated  -TTE 03/24/2021 Left atrium is within the upper limits of normal, EF 60-65%, mild MR/TR, no AS/AR  - telemetry monitoring  -continue PT/OT/Speech   -CTA head and neck 50% stenosis left ICA.  - vasc sx/neurointerventional consulted for further recs - intervention as appropriate for acute CVA - continued recommendations appreciated  - cardiology consulted - continued recs appreciated  -   Carotid Artery stenosis with cerebral infarction  - see above  - left ICA 50% stenosis  - asa, statin  - d/w neurointerventionalist - no intervention planned, may proceed with c-spine surgery    Hemosiderin deposit brain  - on MRI   - will check iron panel/ferritin  - ferritin 700     Dementia:    - patient AO to self, hospital but not which one, and knew president but not date.   - B12, Folate  - Consider neuropsychology testing out patient  - pt not on aricept/namenda     Syncope  - documented on chart however no syncope reported by patient   - likely sudden onset weakness 2/2 acute CVA  [None] : no mitral regurgitation related collapse  - echo unremarkable  - cardiology on consulted for further recs     HTN  - remain poorly controlled  - increase metoprolol to 75 BID from 50mg  - increase lisinopril to 40 from 30  3/28- BP creeping up  - increase metoprolol to 50mg  - permissive HTN  2/2 CVA goal -160  - will continue to monitor and adjust meds as appropriate     DM2  - not optimally controlled however PO intake fluctuates  - increase lantus to 12 units from 10 - continue current SSI  - will monitor and adjust as appropriate     CKD  - likely stage 2  - GFR 83, prior GFR 64 in January 2021  - avoid nephrotoxins  - continue to monitor renal fxn     HLD  - statin     DVT PPx  -Start lovenox subq     Disposition:   3/30/21  Attempted to call both daughters - no answer  Left message for Arlene to call back  Did not leave message for Marjorie Garcia)    DVT Prophylaxis: clopidogrel - 75 MG   Diet:   Dietary Orders (From admission, onward)     Start     Ordered    03/29/21 1701  Regular Diet  DIET EFFECTIVE NOW     Question:  Diet Modifiers  Answer:  Regular    03/29/21 1700               Baseline Activity: Ambulates Independently    CODE STATUS: FULL CODE    Code Status: Full Resuscitation    Physician Notification:  Consultants notified of patient via [employer].  Communication: with patient, nurse     MORE than 35 MINS WERE SPENT ON THIS PATIENTS CARE TODAY, more than 50% of which was spent coordinating patient care.     Primary Care Physician  DO Gaby Dhaliwal MD [facility]  3/30/2021 2:18 PM

## 2021-04-14 ENCOUNTER — APPOINTMENT (OUTPATIENT)
Dept: SURGERY | Facility: HOSPITAL | Age: 61
End: 2021-04-14

## 2021-07-21 NOTE — ED STATDOCS - OBJECTIVE STATEMENT
56 y/o M pt presents to ED c/o pain in R lower extremity that began 4 days ago. Pt recently went on a trip to [place] last week. He was seen at urgent care yesterday and had a doppler done which showed a clot in R gastrocnemius vein. They recommended he come to the ED to see if he needs anticoagulation. Denies groin pain, nausea, and vomiting. No further complaints at this time. Consent (Spinal Accessory)/Introductory Paragraph: The rationale for Mohs was explained to the patient and consent was obtained. The risks, benefits and alternatives to therapy were discussed in detail. Specifically, the risks of damage to the spinal accessory nerve, infection, scarring, bleeding, prolonged wound healing, incomplete removal, allergy to anesthesia, and recurrence were addressed. Prior to the procedure, the treatment site was clearly identified and confirmed by the patient. All components of Universal Protocol/PAUSE Rule completed.

## 2021-08-02 ENCOUNTER — RX RENEWAL (OUTPATIENT)
Age: 61
End: 2021-08-02

## 2021-08-19 ENCOUNTER — APPOINTMENT (OUTPATIENT)
Dept: CARDIOLOGY | Facility: CLINIC | Age: 61
End: 2021-08-19
Payer: COMMERCIAL

## 2021-08-19 ENCOUNTER — NON-APPOINTMENT (OUTPATIENT)
Age: 61
End: 2021-08-19

## 2021-08-19 VITALS
HEIGHT: 74 IN | BODY MASS INDEX: 29.13 KG/M2 | TEMPERATURE: 98 F | OXYGEN SATURATION: 95 % | HEART RATE: 72 BPM | WEIGHT: 227 LBS | SYSTOLIC BLOOD PRESSURE: 94 MMHG | DIASTOLIC BLOOD PRESSURE: 56 MMHG

## 2021-08-19 DIAGNOSIS — K80.20 CALCULUS OF GALLBLADDER W/OUT CHOLECYSTITIS W/OUT OBSTRUCTION: ICD-10-CM

## 2021-08-19 DIAGNOSIS — R06.02 SHORTNESS OF BREATH: ICD-10-CM

## 2021-08-19 DIAGNOSIS — R10.11 RIGHT UPPER QUADRANT PAIN: ICD-10-CM

## 2021-08-19 PROCEDURE — 99214 OFFICE O/P EST MOD 30 MIN: CPT

## 2021-08-19 PROCEDURE — 93000 ELECTROCARDIOGRAM COMPLETE: CPT

## 2021-08-22 PROBLEM — K80.20 GALLSTONES: Status: ACTIVE | Noted: 2021-03-08

## 2021-08-22 PROBLEM — R10.11 ABDOMINAL PAIN, ACUTE, RIGHT UPPER QUADRANT: Status: ACTIVE | Noted: 2021-03-08

## 2021-08-22 NOTE — PHYSICAL EXAM
[Well Developed] : well developed [Well Nourished] : well nourished [No Acute Distress] : no acute distress [Normal Conjunctiva] : normal conjunctiva [Normal Venous Pressure] : normal venous pressure [Normal S1, S2] : normal S1, S2 [No Carotid Bruit] : no carotid bruit [No Rub] : no rub [No Gallop] : no gallop [Clear Lung Fields] : clear lung fields [Good Air Entry] : good air entry [No Respiratory Distress] : no respiratory distress  [Soft] : abdomen soft [Non Tender] : non-tender [No Masses/organomegaly] : no masses/organomegaly [Normal Bowel Sounds] : normal bowel sounds [Normal Gait] : normal gait [No Edema] : no edema [No Cyanosis] : no cyanosis [No Clubbing] : no clubbing [No Varicosities] : no varicosities [No Rash] : no rash [No Skin Lesions] : no skin lesions [Moves all extremities] : moves all extremities [No Focal Deficits] : no focal deficits [Normal Speech] : normal speech [Normal memory] : normal memory [Alert and Oriented] : alert and oriented

## 2021-08-22 NOTE — HISTORY OF PRESENT ILLNESS
[FreeTextEntry1] : 61-year-old male who is here for follow-up due to asymptomatic atrial flutter, prior history of DVT, dilated aortic root.\par \par Is a status post RF ablation for atrial flutter on 6/27/2019 and pulmonary vein isolation and CTI.  Recurrent failed DC cardioversion despite the use of ibutilide.  Patient is currently on rate control and anticoagulated and denies any significant symptoms.  He denies any chest pain or shortness of breath.  He denies any bleeding.  Compliant with all his medications.\par He has cholecystitis and was a scheduled for surgery which was canceled.  He is not sure why and will follow up with his surgeon.  He denies any syncope or presyncope.\par He was last seen on March 2021.\par \par EKG from today shows atrial flutter, typical, 70 bpm and nonspecific QRS widening.  He has nonspecific T wave abnormalities.\par \par His last echocardiogram is from August 2020 showing normal LV systolic function, moderate LV septal hypertrophy, severely enlarged left atrium, aneurysmal interatrial septum, mild mitral valve regurgitation and aortic root at the sinus of Valsalva measuring 4.1 cm.

## 2021-08-22 NOTE — REVIEW OF SYSTEMS
[Weight Gain (___ Lbs)] : [unfilled] ~Ulb weight gain [Dyspnea on exertion] : dyspnea during exertion [Abdominal Pain] : abdominal pain [Negative] : Psychiatric

## 2021-08-22 NOTE — ASSESSMENT
[FreeTextEntry1] : Patient with history of atrial flutter.  He is on long-term anticoagulation without any bleeding.\par Aortic root measures 4.1 cm, this is based on the echo performed last year.  Repeat echocardiogram for progression.\par His blood pressure is to goal most of the time today a little bit low.  Advised him to increase fluid intake.\par He has gallstones and will need surgery with prior history of cholecystitis.  He is cleared from our standpoint to proceed.  He can stop taking anticoagulation 48 hours prior to upcoming surgery.\par He has chronic shortness of breath which has not changed.  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

## 2021-08-23 NOTE — PROVIDER CONTACT NOTE (CRITICAL VALUE NOTIFICATION) - NAME OF MD/NP/PA/DO NOTIFIED:
Follow Up:  UTI    Interval History:    REVIEW OF SYSTEMS  [  ] ROS unobtainable because:    [  ] All other systems negative except as noted below    Constitutional:  [ ] fever [ ] chills  [ ] weight loss  [ ] weakness  Skin:  [ ] rash [ ] phlebitis	  Eyes: [ ] icterus [ ] pain  [ ] discharge	  ENMT: [ ] sore throat  [ ] thrush [ ] ulcers [ ] exudates  Respiratory: [ ] dyspnea [ ] hemoptysis [ ] cough [ ] sputum	  Cardiovascular:  [ ] chest pain [ ] palpitations [ ] edema	  Gastrointestinal:  [ ] nausea [ ] vomiting [ ] diarrhea [ ] constipation [ ] pain	  Genitourinary:  [ ] dysuria [ ] frequency [ ] hematuria [ ] discharge [ ] flank pain  [ ] incontinence  Musculoskeletal:  [ ] myalgias [ ] arthralgias [ ] arthritis  [ ] back pain  Neurological:  [ ] headache [ ] seizures  [ ] confusion/altered mental status    Allergies  No Known Allergies        ANTIMICROBIALS:  piperacillin/tazobactam IVPB.. 3.375 every 8 hours  trimethoprim   80 mG/sulfamethoxazole 400 mG 1 daily      OTHER MEDS:  MEDICATIONS  (STANDING):  allopurinol 100 daily  calcium carbonate    500 mG (Tums) Chewable 1 three times a day  dextrose 40% Gel 15 once  dextrose 50% Injectable 25 once  dextrose 50% Injectable 12.5 once  dextrose 50% Injectable 25 once  finasteride 5 daily  glucagon  Injectable 1 once  heparin   Injectable 5000 every 8 hours  insulin lispro (ADMELOG) corrective regimen sliding scale  three times a day before meals  insulin lispro (ADMELOG) corrective regimen sliding scale  at bedtime  metoprolol succinate  daily  NIFEdipine XL 60 daily  predniSONE   Tablet 5 daily  tacrolimus ER Tablet (ENVARSUS XR) 5 daily  tamsulosin 0.8 at bedtime      Vital Signs Last 24 Hrs  T(C): 36.4 (23 Aug 2021 13:12), Max: 36.6 (22 Aug 2021 20:48)  T(F): 97.6 (23 Aug 2021 13:12), Max: 97.9 (22 Aug 2021 20:48)  HR: 72 (23 Aug 2021 13:12) (64 - 72)  BP: 103/65 (23 Aug 2021 13:12) (103/65 - 174/76)  BP(mean): --  RR: 18 (23 Aug 2021 13:12) (16 - 18)  SpO2: 98% (23 Aug 2021 13:12) (98% - 98%)    PHYSICAL EXAMINATION:  General: Alert and Awake, NAD  HEENT: PERRL, EOMI  Neck: Supple  Cardiac: RRR, No M/R/G  Resp: CTAB, No Wh/Rh/Ra  Abdomen: NBS, NT/ND, No HSM, No rigidity or guarding  MSK: No LE edema. No Calf tenderness  : No armenta  Skin: No rashes or lesions. Skin is warm and dry to the touch.   Neuro: Alert and Awake. CN 2-12 Grossly intact. Moves all four extremities spontaneously.  Psych: Calm, Pleasant, Cooperative                          10.0   4.36  )-----------( 159      ( 23 Aug 2021 10:12 )             33.7       08-23    138  |  102  |  26<H>  ----------------------------<  224<H>  4.4   |  24  |  2.92<H>    Ca    10.1      23 Aug 2021 10:12  Phos  3.3     08-23  Mg     1.9     08-23    TPro  6.6  /  Alb  3.9  /  TBili  0.3  /  DBili  x   /  AST  15  /  ALT  10  /  AlkPhos  60  08-23          MICROBIOLOGY:  v  .Blood Blood  08-21-21   No growth to date.  --  --      .Blood Blood-Peripheral  08-20-21   No growth to date.  --  --      Clean Catch Clean Catch (Midstream)  08-20-21   >100,000 CFU/ml Pseudomonas aeruginosa  --  Pseudomonas aeruginosa      .Stool Feces  08-02-21   GI PCR Results: NOT detected  *******Please Note:*******  GI panel PCR evaluates for:  Campylobacter, Plesiomonas shigelloides, Salmonella,  Vibrio, Yersinia enterocolitica, Enteroaggregative  Escherichia coli (EAEC), Enteropathogenic E.coli (EPEC),  Enterotoxigenic E. coli (ETEC) lt/st, Shiga-like  toxin-producing E. coli (STEC) stx1/stx2,  Shigella/ Enteroinvasive E. coli (EIEC), Cryptosporidium,  Cyclospora cayetanensis, Entamoeba histolytica,  Giardia lamblia, Adenovirus F 40/41, Astrovirus,  Norovirus GI/GII, Rotavirus A, Sapovirus  --  --      Clean Catch Clean Catch (Midstream)  08-01-21   >100,000 CFU/ml Pseudomonas aeruginosa  Multiple Morphological Strains  --  Pseudomonas aeruginosa  Pseudomonas aeruginosa    RADIOLOGY:    <The imaging below has been reviewed and visualized by me independently. Findings as detailed in report below>    < from: US Trans Kidney w/ Doppler, Right (08.20.21 @ 16:43) >  IMPRESSION:    Again noted, is mild fullness of the transplant collecting system and urothelial thickening, similar in appearance to the prior study dated 8/1/2021.    No evidence of a significant renal artery stenosis. Elevated resistive indices are again noted, similar to prior study.    < end of copied text >   Follow Up:  UTI    Interval History: afebrile. no urinary symptoms. no abdominal pain.     REVIEW OF SYSTEMS  [  ] ROS unobtainable because:    [ x ] All other systems negative except as noted below    Constitutional:  [ ] fever [ ] chills  [ ] weight loss  [ ] weakness  Skin:  [ ] rash [ ] phlebitis	  Eyes: [ ] icterus [ ] pain  [ ] discharge	  ENMT: [ ] sore throat  [ ] thrush [ ] ulcers [ ] exudates  Respiratory: [ ] dyspnea [ ] hemoptysis [ ] cough [ ] sputum	  Cardiovascular:  [ ] chest pain [ ] palpitations [ ] edema	  Gastrointestinal:  [ ] nausea [ ] vomiting [ ] diarrhea [ ] constipation [ ] pain	  Genitourinary:  [ ] dysuria [ ] frequency [ ] hematuria [ ] discharge [ ] flank pain  [ ] incontinence  Musculoskeletal:  [ ] myalgias [ ] arthralgias [ ] arthritis  [ ] back pain  Neurological:  [ ] headache [ ] seizures  [ ] confusion/altered mental status    Allergies  No Known Allergies        ANTIMICROBIALS:  piperacillin/tazobactam IVPB.. 3.375 every 8 hours  trimethoprim   80 mG/sulfamethoxazole 400 mG 1 daily      OTHER MEDS:  MEDICATIONS  (STANDING):  allopurinol 100 daily  calcium carbonate    500 mG (Tums) Chewable 1 three times a day  dextrose 40% Gel 15 once  dextrose 50% Injectable 25 once  dextrose 50% Injectable 12.5 once  dextrose 50% Injectable 25 once  finasteride 5 daily  glucagon  Injectable 1 once  heparin   Injectable 5000 every 8 hours  insulin lispro (ADMELOG) corrective regimen sliding scale  three times a day before meals  insulin lispro (ADMELOG) corrective regimen sliding scale  at bedtime  metoprolol succinate  daily  NIFEdipine XL 60 daily  predniSONE   Tablet 5 daily  tacrolimus ER Tablet (ENVARSUS XR) 5 daily  tamsulosin 0.8 at bedtime      Vital Signs Last 24 Hrs  T(C): 36.4 (23 Aug 2021 13:12), Max: 36.6 (22 Aug 2021 20:48)  T(F): 97.6 (23 Aug 2021 13:12), Max: 97.9 (22 Aug 2021 20:48)  HR: 72 (23 Aug 2021 13:12) (64 - 72)  BP: 103/65 (23 Aug 2021 13:12) (103/65 - 174/76)  BP(mean): --  RR: 18 (23 Aug 2021 13:12) (16 - 18)  SpO2: 98% (23 Aug 2021 13:12) (98% - 98%)    PHYSICAL EXAMINATION:  General: Alert and Awake, NAD  Cardiac: RRR, No M/R/G  Resp: CTAB, No Wh/Rh/Ra  Abdomen: NBS, NT/ND, No HSM, No rigidity or guarding  MSK: No LE edema. No Calf tenderness  : + armenta  Skin: No rashes or lesions. Skin is warm and dry to the touch.   Neuro: Alert and Awake. CN 2-12 Grossly intact. Moves all four extremities spontaneously.  Psych: Calm, Pleasant, Cooperative                          10.0   4.36  )-----------( 159      ( 23 Aug 2021 10:12 )             33.7       08-23    138  |  102  |  26<H>  ----------------------------<  224<H>  4.4   |  24  |  2.92<H>    Ca    10.1      23 Aug 2021 10:12  Phos  3.3     08-23  Mg     1.9     08-23    TPro  6.6  /  Alb  3.9  /  TBili  0.3  /  DBili  x   /  AST  15  /  ALT  10  /  AlkPhos  60  08-23          MICROBIOLOGY:  v  .Blood Blood  08-21-21   No growth to date.  --  --      .Blood Blood-Peripheral  08-20-21   No growth to date.  --  --      Clean Catch Clean Catch (Midstream)  08-20-21   >100,000 CFU/ml Pseudomonas aeruginosa  --  Pseudomonas aeruginosa      .Stool Feces  08-02-21   GI PCR Results: NOT detected  *******Please Note:*******  GI panel PCR evaluates for:  Campylobacter, Plesiomonas shigelloides, Salmonella,  Vibrio, Yersinia enterocolitica, Enteroaggregative  Escherichia coli (EAEC), Enteropathogenic E.coli (EPEC),  Enterotoxigenic E. coli (ETEC) lt/st, Shiga-like  toxin-producing E. coli (STEC) stx1/stx2,  Shigella/ Enteroinvasive E. coli (EIEC), Cryptosporidium,  Cyclospora cayetanensis, Entamoeba histolytica,  Giardia lamblia, Adenovirus F 40/41, Astrovirus,  Norovirus GI/GII, Rotavirus A, Sapovirus  --  --      Clean Catch Clean Catch (Midstream)  08-01-21   >100,000 CFU/ml Pseudomonas aeruginosa  Multiple Morphological Strains  --  Pseudomonas aeruginosa  Pseudomonas aeruginosa    RADIOLOGY:    <The imaging below has been reviewed and visualized by me independently. Findings as detailed in report below>    < from: US Trans Kidney w/ Doppler, Right (08.20.21 @ 16:43) >  IMPRESSION:    Again noted, is mild fullness of the transplant collecting system and urothelial thickening, similar in appearance to the prior study dated 8/1/2021.    No evidence of a significant renal artery stenosis. Elevated resistive indices are again noted, similar to prior study.    < end of copied text >   Matthew

## 2021-08-23 NOTE — ED PROVIDER NOTE - ATTENDING CONTRIBUTION TO CARE
[Follow-Up - Clinic] : a clinic follow-up of [Coronary Artery Disease] : coronary artery disease [Hyperlipidemia] : hyperlipidemia [Hypertension] : hypertension [FreeTextEntry1] : This is a 47 year year old male here today for follow up cardiac followup evaluation. \par He has a past medical history significant for hyperglycemia/prediabetes, status post COVID-19 infection complicated by pneumonia, acute kidney injury, status post stroke, on Coumadin.\par \par Today he is feeling generally well and does not have any complaints at this time. 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  No trauma or injury.  no heavy lifting.  h/o similar problem in the past.  Denies radiation of pain, bowel/ bladder dysfxn, numbness of extremity.  Has not taken any meds.  PE; nontoxic appearing, NAD< no midline l-spine tenderness, nonlocalized left lower paralumbar muscle tenderness, normal gait, L4-S1 motor 5/5 and equal randell.  A/P back pain:  anticipatory guidance provided, muscle relaxant and tylenol recommended (no NSAID; pt on Ac FOR aFIB)

## 2021-08-31 NOTE — ED PROVIDER NOTE - OBJECTIVE STATEMENT
59yo M s/p cardiac ablation for a flutter 3 days ago, h/o DVT on eliquis was on lovenox for 2 days, otherwise no missed doses. since D/C yesterday with back pain soreness also in chest. mild SOB. worse with laying down. no h/o ACS/CHF. no fever/chills. no cough. called Dr. Aaron who sent to ED. Photo Preface (Leave Blank If You Do Not Want): Photographs were obtained today Detail Level: Zone 59yo M s/p cardiac ablation for a flutter 3 days ago, h/o DVT on eliquis was on lovenox for 2 days, otherwise no missed doses. since D/C yesterday with back pain soreness also in chest. mild SOB. worse with laying down. no h/o ACS/CHF. no fever/chills. no cough. called Dr. Aaron who sent to ED.    Dr. John Cedillo

## 2021-09-02 ENCOUNTER — APPOINTMENT (OUTPATIENT)
Dept: CARDIOLOGY | Facility: CLINIC | Age: 61
End: 2021-09-02
Payer: COMMERCIAL

## 2021-09-02 PROCEDURE — 93306 TTE W/DOPPLER COMPLETE: CPT

## 2021-09-02 RX ORDER — PERFLUTREN 6.52 MG/ML
6.52 INJECTION, SUSPENSION INTRAVENOUS
Qty: 2 | Refills: 0 | Status: COMPLETED | OUTPATIENT
Start: 2021-09-02

## 2021-09-09 ENCOUNTER — APPOINTMENT (OUTPATIENT)
Dept: ORTHOPEDIC SURGERY | Facility: CLINIC | Age: 61
End: 2021-09-09
Payer: COMMERCIAL

## 2021-09-09 VITALS
BODY MASS INDEX: 29.13 KG/M2 | WEIGHT: 227 LBS | SYSTOLIC BLOOD PRESSURE: 126 MMHG | HEART RATE: 78 BPM | DIASTOLIC BLOOD PRESSURE: 78 MMHG | HEIGHT: 74 IN

## 2021-09-09 DIAGNOSIS — M17.11 UNILATERAL PRIMARY OSTEOARTHRITIS, RIGHT KNEE: ICD-10-CM

## 2021-09-09 DIAGNOSIS — M22.2X1 PATELLOFEMORAL DISORDERS, RIGHT KNEE: ICD-10-CM

## 2021-09-09 PROCEDURE — 99204 OFFICE O/P NEW MOD 45 MIN: CPT

## 2021-09-09 PROCEDURE — 73562 X-RAY EXAM OF KNEE 3: CPT | Mod: RT

## 2021-09-10 PROBLEM — M17.11 PRIMARY OSTEOARTHRITIS OF RIGHT KNEE: Status: ACTIVE | Noted: 2021-09-09

## 2021-09-11 PROBLEM — M22.2X1 PATELLOFEMORAL PAIN SYNDROME OF RIGHT KNEE: Status: ACTIVE | Noted: 2021-09-09

## 2021-09-13 ENCOUNTER — TRANSCRIPTION ENCOUNTER (OUTPATIENT)
Age: 61
End: 2021-09-13

## 2021-10-14 ENCOUNTER — APPOINTMENT (OUTPATIENT)
Dept: ORTHOPEDIC SURGERY | Facility: CLINIC | Age: 61
End: 2021-10-14
Payer: COMMERCIAL

## 2021-10-14 VITALS
DIASTOLIC BLOOD PRESSURE: 52 MMHG | WEIGHT: 227 LBS | BODY MASS INDEX: 29.13 KG/M2 | HEIGHT: 74 IN | SYSTOLIC BLOOD PRESSURE: 90 MMHG | HEART RATE: 49 BPM

## 2021-10-14 PROCEDURE — 20610 DRAIN/INJ JOINT/BURSA W/O US: CPT | Mod: RT

## 2021-10-14 PROCEDURE — 99214 OFFICE O/P EST MOD 30 MIN: CPT | Mod: 25

## 2021-10-14 PROCEDURE — 73564 X-RAY EXAM KNEE 4 OR MORE: CPT | Mod: RT

## 2021-10-15 NOTE — CONSULT LETTER
[Dear  ___] : Dear  [unfilled], [Consult Letter:] : I had the pleasure of evaluating your patient, [unfilled]. [Please see my note below.] : Please see my note below. [Consult Closing:] : Thank you very much for allowing me to participate in the care of this patient.  If you have any questions, please do not hesitate to contact me. [Sincerely,] : Sincerely, [FreeTextEntry2] : PAOLA ENGLAND MD\par  [FreeTextEntry3] : Red Chapin MD\par Chief of Joint Replacement\par [facility] \par [facility]\par \par

## 2021-10-15 NOTE — HISTORY OF PRESENT ILLNESS
[de-identified] : Patient is a 60-year-old male presenting for evaluation of 2-month history of right knee pain.  He states he tripped and landed directly on the right knee approximately 2 months ago at which point the knee became painful and swollen.  He states the swelling has gone down however now pain is localized to the medial aspect of the knee.  His pain has been consistent and has been worse with all weightbearing to and including walking long distance, standing for long periods time, and using the stairs.  He denies any buckling or locking of the knee.  He admits to intermittent swelling.  He has not had injections for the knee thus far.  He has tried therapy and home exercise without relief.  Takes anti-inflammatories and Tylenol without significant benefit.

## 2021-10-15 NOTE — PHYSICAL EXAM
[de-identified] : Exam of the right knee shows there is an effusion.  There is tenderness along the medial joint line and pain with Austin's testing.  Full extension.  Flexion 130 degrees with medial knee pain.  5/5 motor strength bilaterally distally. Sensation intact distally.		  [de-identified] : The patient appears well nourished  and in no apparent distress.  The patient is alert and oriented to person, place, and time.   Affect and mood appear normal. The head is normocephalic and atraumatic.  The eyes reveal normal sclera and extra ocular muscles are intact. The tongue is midline with no apparent lesions.  Skin shows normal turgor with no evidence of eczema or psoriasis.  No respiratory distress noted.  Sensation grossly intact.		  [de-identified] : X-ray: 4 views of the right knee demonstrate mild to moderate medial joint space narrowing.

## 2021-10-15 NOTE — PROCEDURE
[de-identified] : Using sterile technique, 2cc of depomedrol 40mg/ml, 4cc of 1% plain lidocaine, and 2 cc 0.25% marcaine was drawn up into a sterile syringe. The right knee was then sterilely prepped with chlorhexidine. Ethyl chloride spray was used to anesthetize the skin and subQ tissue. The depomedrol/lidocaine/marcaine mixture was then injected into the knee joint in the anterolateral position. The patient tolerated the procedure well without difficulty. The patient was given instructions on the use of ice and anti-inflammatories post injection site soreness.

## 2021-10-15 NOTE — ADDENDUM
[FreeTextEntry1] : This note was authored by Miguel Randolph working as a medical scribe for Dr. Red Chapin. The note was reviewed, edited, and revised by Dr. Red Chapin whom is in agreement with the exam findings, imaging findings, and treatment plan. 10/14/2021

## 2021-10-25 ENCOUNTER — RX RENEWAL (OUTPATIENT)
Age: 61
End: 2021-10-25

## 2021-10-26 ENCOUNTER — APPOINTMENT (OUTPATIENT)
Dept: MRI IMAGING | Facility: CLINIC | Age: 61
End: 2021-10-26
Payer: COMMERCIAL

## 2021-10-26 ENCOUNTER — OUTPATIENT (OUTPATIENT)
Dept: OUTPATIENT SERVICES | Facility: HOSPITAL | Age: 61
LOS: 1 days | End: 2021-10-26

## 2021-10-26 DIAGNOSIS — Z98.890 OTHER SPECIFIED POSTPROCEDURAL STATES: Chronic | ICD-10-CM

## 2021-10-26 DIAGNOSIS — S83.241A OTHER TEAR OF MEDIAL MENISCUS, CURRENT INJURY, RIGHT KNEE, INITIAL ENCOUNTER: ICD-10-CM

## 2021-10-26 DIAGNOSIS — Z90.49 ACQUIRED ABSENCE OF OTHER SPECIFIED PARTS OF DIGESTIVE TRACT: Chronic | ICD-10-CM

## 2021-10-26 PROCEDURE — 73721 MRI JNT OF LWR EXTRE W/O DYE: CPT | Mod: 26,RT

## 2021-12-03 ENCOUNTER — APPOINTMENT (OUTPATIENT)
Dept: ORTHOPEDIC SURGERY | Facility: CLINIC | Age: 61
End: 2021-12-03
Payer: COMMERCIAL

## 2021-12-03 VITALS
SYSTOLIC BLOOD PRESSURE: 137 MMHG | HEART RATE: 97 BPM | DIASTOLIC BLOOD PRESSURE: 95 MMHG | HEIGHT: 74 IN | BODY MASS INDEX: 29.13 KG/M2 | WEIGHT: 227 LBS

## 2021-12-03 DIAGNOSIS — S83.241A OTHER TEAR OF MEDIAL MENISCUS, CURRENT INJURY, RIGHT KNEE, INITIAL ENCOUNTER: ICD-10-CM

## 2021-12-03 PROCEDURE — 99214 OFFICE O/P EST MOD 30 MIN: CPT

## 2021-12-03 NOTE — PHYSICAL EXAM
[de-identified] : The patient appears well nourished  and in no apparent distress.  The patient is alert and oriented to person, place, and time.   Affect and mood appear normal. The head is normocephalic and atraumatic.  The eyes reveal normal sclera and extra ocular muscles are intact. The tongue is midline with no apparent lesions.  Skin shows normal turgor with no evidence of eczema or psoriasis.  No respiratory distress noted.  Sensation grossly intact.		  [de-identified] : Exam of the right knee shows a small effusion.  There is no tenderness along the medial joint line or with Austin's testing.  Full extension.  Flexion 130 degrees with medial knee pain.  5/5 motor strength bilaterally distally. Sensation intact distally.		  [de-identified] : Prior X-ray obtained on 10/14/21: 4 views of the right knee demonstrate mild to moderate medial joint space narrowing.	\par \par MRI Right knee obtained on 10/26/21 demonstrated:\par 1. Complex tear of the posterior horn of the medial meniscus.\par 2. Small knee joint effusion.

## 2021-12-03 NOTE — HISTORY OF PRESENT ILLNESS
[de-identified] : Patient is a 60-year-old male presenting for evaluation of right knee pain, MRI review. He states the right knee cortisone injection at the last visit provided significant relief.  He states he tripped and landed directly on the right knee approximately 4 months ago at which point the knee became painful and swollen.  He states the swelling has gone down however now occasional pain is localized to the medial aspect of the knee. He denies any buckling or locking of the knee. He has tried therapy and home exercise without relief.  Takes anti-inflammatories and Tylenol without significant benefit.

## 2021-12-03 NOTE — DISCUSSION/SUMMARY
[de-identified] : MELANIA NICKERSON is a 60 year male who presents with mild right knee medial compartment arthritis and complex tear of the posterior horn of the medial meniscus.  We discussed operative and non operative treatment. He was advised that he may benefit from a RIGHT knee arthroscopy with partial meniscectomy. Risks, benefits and alternatives to surgery were discussed and all questions were answered. Recovery from surgery was also discussed. The percentages of success in an arthroscopy that involves a torn meniscus and arthritic changes is dependent upon how bad the arthritic changes are. Basically, removing a meniscal tear allows us to ascertain how bad the patient's articular cartilage destruction (arthritis) is. The arthroscopy cleans out any debris from the arthritic process as well as removing the meniscal tear. Approximately 75% of the patients will say that they feel relief, although their x-rays will continue to show significant arthritic changes. Arthroscopy for arthritis is a temporizing procedure, yielding subjective success (patient satisfaction) for less than two to five years. In some cases, the knee might eventually require a knee replacement for symptomatic relief. The prognostic factors that are somewhat favorable predictive values in arthroscopic debridements (removal of loose articular cartilage, loose body and inflamed synovium) of an arthritic knee are: short duration of symptoms, effusion (swelling), minimal deformity and good range of motion. The complications with any arthroscopy include the risk of anaesthetic complications and death, blood clots and pulmonary embolus, infection (less than 1%), nerve damage, by which we would mean a peroneal palsy (less than 0.1%) (small area of skin numbness is so common, we do not consider its presence a complication), injury to the popliteal artery, which is so rare that there are no statistics, but should it occur could theoretically lead to amputation, which is extremely unlikely. There is often a chance of getting a hemarthrosis (blood in the joint) but this usually resolves with local measures of icing, physical therapy, and aspiration. Reflex sympathetic dystrophy (RSD) is another extremely rare but theoretical complication. This (RSD) means that the patient has a stiff painful joint that is out of proportion to the objective pathology of the knee. Subsequently, it might require years of physical therapy before one regains a functional knee with RSD. Infrapatellar contracture syndrome (stiff joint) is sometimes reported and associated with RSD, but it usually is a result of not being aggressive in physical therapy. I think the patient understands the risk benefit ratio of arthroscopy and will think about whether they would prefer the nonoperative or surgical treatment option.

## 2022-02-10 ENCOUNTER — OUTPATIENT (OUTPATIENT)
Dept: OUTPATIENT SERVICES | Facility: HOSPITAL | Age: 62
LOS: 1 days | End: 2022-02-10
Payer: COMMERCIAL

## 2022-02-10 DIAGNOSIS — Z98.890 OTHER SPECIFIED POSTPROCEDURAL STATES: Chronic | ICD-10-CM

## 2022-02-10 DIAGNOSIS — Z90.49 ACQUIRED ABSENCE OF OTHER SPECIFIED PARTS OF DIGESTIVE TRACT: Chronic | ICD-10-CM

## 2022-02-10 DIAGNOSIS — Z01.818 ENCOUNTER FOR OTHER PREPROCEDURAL EXAMINATION: ICD-10-CM

## 2022-02-10 LAB
A1C WITH ESTIMATED AVERAGE GLUCOSE RESULT: 5.8 % — HIGH (ref 4–5.6)
ALBUMIN SERPL ELPH-MCNC: 4.4 G/DL — SIGNIFICANT CHANGE UP (ref 3.3–5.2)
ALP SERPL-CCNC: 80 U/L — SIGNIFICANT CHANGE UP (ref 40–120)
ALT FLD-CCNC: 26 U/L — SIGNIFICANT CHANGE UP
ANION GAP SERPL CALC-SCNC: 9 MMOL/L — SIGNIFICANT CHANGE UP (ref 5–17)
APTT BLD: 29.7 SEC — SIGNIFICANT CHANGE UP (ref 27.5–35.5)
AST SERPL-CCNC: 26 U/L — SIGNIFICANT CHANGE UP
BASOPHILS # BLD AUTO: 0.05 K/UL — SIGNIFICANT CHANGE UP (ref 0–0.2)
BASOPHILS NFR BLD AUTO: 0.9 % — SIGNIFICANT CHANGE UP (ref 0–2)
BILIRUB SERPL-MCNC: 0.8 MG/DL — SIGNIFICANT CHANGE UP (ref 0.4–2)
BUN SERPL-MCNC: 16.1 MG/DL — SIGNIFICANT CHANGE UP (ref 8–20)
CALCIUM SERPL-MCNC: 9.2 MG/DL — SIGNIFICANT CHANGE UP (ref 8.6–10.2)
CHLORIDE SERPL-SCNC: 105 MMOL/L — SIGNIFICANT CHANGE UP (ref 98–107)
CO2 SERPL-SCNC: 25 MMOL/L — SIGNIFICANT CHANGE UP (ref 22–29)
CREAT SERPL-MCNC: 0.88 MG/DL — SIGNIFICANT CHANGE UP (ref 0.5–1.3)
EOSINOPHIL # BLD AUTO: 0.09 K/UL — SIGNIFICANT CHANGE UP (ref 0–0.5)
EOSINOPHIL NFR BLD AUTO: 1.6 % — SIGNIFICANT CHANGE UP (ref 0–6)
ESTIMATED AVERAGE GLUCOSE: 120 MG/DL — HIGH (ref 68–114)
GLUCOSE SERPL-MCNC: 94 MG/DL — SIGNIFICANT CHANGE UP (ref 70–99)
HCT VFR BLD CALC: 42.8 % — SIGNIFICANT CHANGE UP (ref 39–50)
HGB BLD-MCNC: 14.1 G/DL — SIGNIFICANT CHANGE UP (ref 13–17)
IMM GRANULOCYTES NFR BLD AUTO: 0.4 % — SIGNIFICANT CHANGE UP (ref 0–1.5)
INR BLD: 1.34 RATIO — HIGH (ref 0.88–1.16)
LYMPHOCYTES # BLD AUTO: 1.45 K/UL — SIGNIFICANT CHANGE UP (ref 1–3.3)
LYMPHOCYTES # BLD AUTO: 26.1 % — SIGNIFICANT CHANGE UP (ref 13–44)
MCHC RBC-ENTMCNC: 29.1 PG — SIGNIFICANT CHANGE UP (ref 27–34)
MCHC RBC-ENTMCNC: 32.9 GM/DL — SIGNIFICANT CHANGE UP (ref 32–36)
MCV RBC AUTO: 88.4 FL — SIGNIFICANT CHANGE UP (ref 80–100)
MONOCYTES # BLD AUTO: 0.53 K/UL — SIGNIFICANT CHANGE UP (ref 0–0.9)
MONOCYTES NFR BLD AUTO: 9.5 % — SIGNIFICANT CHANGE UP (ref 2–14)
NEUTROPHILS # BLD AUTO: 3.42 K/UL — SIGNIFICANT CHANGE UP (ref 1.8–7.4)
NEUTROPHILS NFR BLD AUTO: 61.5 % — SIGNIFICANT CHANGE UP (ref 43–77)
PLATELET # BLD AUTO: 198 K/UL — SIGNIFICANT CHANGE UP (ref 150–400)
POTASSIUM SERPL-MCNC: 4.6 MMOL/L — SIGNIFICANT CHANGE UP (ref 3.5–5.3)
POTASSIUM SERPL-SCNC: 4.6 MMOL/L — SIGNIFICANT CHANGE UP (ref 3.5–5.3)
PROT SERPL-MCNC: 6.9 G/DL — SIGNIFICANT CHANGE UP (ref 6.6–8.7)
PROTHROM AB SERPL-ACNC: 15.3 SEC — HIGH (ref 10.6–13.6)
RBC # BLD: 4.84 M/UL — SIGNIFICANT CHANGE UP (ref 4.2–5.8)
RBC # FLD: 12.9 % — SIGNIFICANT CHANGE UP (ref 10.3–14.5)
SODIUM SERPL-SCNC: 139 MMOL/L — SIGNIFICANT CHANGE UP (ref 135–145)
WBC # BLD: 5.56 K/UL — SIGNIFICANT CHANGE UP (ref 3.8–10.5)
WBC # FLD AUTO: 5.56 K/UL — SIGNIFICANT CHANGE UP (ref 3.8–10.5)

## 2022-02-10 PROCEDURE — 80053 COMPREHEN METABOLIC PANEL: CPT

## 2022-02-10 PROCEDURE — 85025 COMPLETE CBC W/AUTO DIFF WBC: CPT

## 2022-02-10 PROCEDURE — 36415 COLL VENOUS BLD VENIPUNCTURE: CPT

## 2022-02-10 PROCEDURE — G0463: CPT

## 2022-02-10 PROCEDURE — 93010 ELECTROCARDIOGRAM REPORT: CPT

## 2022-02-10 PROCEDURE — 85730 THROMBOPLASTIN TIME PARTIAL: CPT

## 2022-02-10 PROCEDURE — 93005 ELECTROCARDIOGRAM TRACING: CPT

## 2022-02-10 PROCEDURE — 85610 PROTHROMBIN TIME: CPT

## 2022-02-10 PROCEDURE — 83036 HEMOGLOBIN GLYCOSYLATED A1C: CPT

## 2022-02-16 ENCOUNTER — RESULT CHARGE (OUTPATIENT)
Age: 62
End: 2022-02-16

## 2022-02-17 ENCOUNTER — APPOINTMENT (OUTPATIENT)
Dept: CARDIOLOGY | Facility: CLINIC | Age: 62
End: 2022-02-17
Payer: COMMERCIAL

## 2022-02-17 ENCOUNTER — NON-APPOINTMENT (OUTPATIENT)
Age: 62
End: 2022-02-17

## 2022-02-17 VITALS
WEIGHT: 225 LBS | BODY MASS INDEX: 28.88 KG/M2 | SYSTOLIC BLOOD PRESSURE: 126 MMHG | OXYGEN SATURATION: 98 % | DIASTOLIC BLOOD PRESSURE: 78 MMHG | TEMPERATURE: 97.6 F | HEIGHT: 74 IN | HEART RATE: 73 BPM

## 2022-02-17 PROCEDURE — 99214 OFFICE O/P EST MOD 30 MIN: CPT

## 2022-02-17 PROCEDURE — 93000 ELECTROCARDIOGRAM COMPLETE: CPT

## 2022-03-03 ENCOUNTER — APPOINTMENT (OUTPATIENT)
Dept: ORTHOPEDIC SURGERY | Facility: AMBULATORY SURGERY CENTER | Age: 62
End: 2022-03-03
Payer: COMMERCIAL

## 2022-03-03 PROCEDURE — 29881 ARTHRS KNE SRG MNISECTMY M/L: CPT | Mod: RT

## 2022-03-18 ENCOUNTER — APPOINTMENT (OUTPATIENT)
Dept: ORTHOPEDIC SURGERY | Facility: CLINIC | Age: 62
End: 2022-03-18
Payer: COMMERCIAL

## 2022-03-18 VITALS
BODY MASS INDEX: 28.88 KG/M2 | HEIGHT: 74 IN | HEART RATE: 73 BPM | SYSTOLIC BLOOD PRESSURE: 126 MMHG | WEIGHT: 225 LBS | DIASTOLIC BLOOD PRESSURE: 78 MMHG

## 2022-03-18 DIAGNOSIS — Z98.890 OTHER SPECIFIED POSTPROCEDURAL STATES: ICD-10-CM

## 2022-03-18 PROCEDURE — 99024 POSTOP FOLLOW-UP VISIT: CPT

## 2022-03-18 NOTE — HISTORY OF PRESENT ILLNESS
[Doing Well] : is doing well [de-identified] : s/o Right knee partial medial meniscectomy DOS:03/03/22 [de-identified] : MELANIA NICKERSON is a 61 year y/o male who presents for 1st postop s/p Right knee partial medial meniscectomy. He is doing very well at this time. He is ambulating without any assistive devices. He reports continuing outpatient physical therapy with good improvement of strength. He has intermitted medial knee discomfort. Overall, he is very happy with the results of the surgery.  [de-identified] : Exam of the right knee shows portal sites healed, no effusion, 0 to 135 degrees of flexion measured with a goniometer. \par 5/5 motor strength bilaterally distally. Sensation intact distally.  [de-identified] : The patient is doing very well 3 weeks following right knee partial medial meniscectomy. The patient will continue outpatient physical therapy for 2-3 wks then gradually progress toward home exercises. Overall the patient is making excellent progress and is very happy with their result so far. Follow up in 3 weeks for repeat evaluation.

## 2022-03-18 NOTE — ADDENDUM
[FreeTextEntry1] : Documented by Anselmo France acting as a scribe for Dr. Chapin on 03/18/2022. \par \par All medical record entries made by the Scribe were at my, Dr. Chapin's, direction and\par personally dictated by me on 03/18/2022. I have reviewed the chart and agree that the record\par accurately reflects my personal performance of the history, physical exam, procedure and imaging.

## 2022-03-21 ENCOUNTER — RX RENEWAL (OUTPATIENT)
Age: 62
End: 2022-03-21

## 2022-03-23 ENCOUNTER — APPOINTMENT (OUTPATIENT)
Dept: MRI IMAGING | Facility: CLINIC | Age: 62
End: 2022-03-23

## 2022-03-23 ENCOUNTER — OUTPATIENT (OUTPATIENT)
Dept: OUTPATIENT SERVICES | Facility: HOSPITAL | Age: 62
LOS: 1 days | End: 2022-03-23

## 2022-03-23 DIAGNOSIS — I42.2 OTHER HYPERTROPHIC CARDIOMYOPATHY: ICD-10-CM

## 2022-03-23 DIAGNOSIS — Z98.890 OTHER SPECIFIED POSTPROCEDURAL STATES: Chronic | ICD-10-CM

## 2022-03-23 DIAGNOSIS — Z90.49 ACQUIRED ABSENCE OF OTHER SPECIFIED PARTS OF DIGESTIVE TRACT: Chronic | ICD-10-CM

## 2022-03-28 ENCOUNTER — APPOINTMENT (OUTPATIENT)
Dept: CARDIOLOGY | Facility: CLINIC | Age: 62
End: 2022-03-28

## 2022-04-15 ENCOUNTER — APPOINTMENT (OUTPATIENT)
Dept: ORTHOPEDIC SURGERY | Facility: CLINIC | Age: 62
End: 2022-04-15
Payer: COMMERCIAL

## 2022-04-15 VITALS
WEIGHT: 225 LBS | BODY MASS INDEX: 28.88 KG/M2 | DIASTOLIC BLOOD PRESSURE: 74 MMHG | SYSTOLIC BLOOD PRESSURE: 132 MMHG | HEIGHT: 74 IN | HEART RATE: 77 BPM

## 2022-04-15 PROCEDURE — 99024 POSTOP FOLLOW-UP VISIT: CPT

## 2022-04-15 NOTE — HISTORY OF PRESENT ILLNESS
[___ Weeks Post Op] : [unfilled] weeks post op [de-identified] : s/o Right knee partial medial meniscectomy DOS:03/03/22. \par \par  [de-identified] : MELANIA NICKERSON is a 61 year male 6 weeks s/p Right knee partial medial meniscectomy  He is doing very well at this time. He is ambulating without any assistive devices. He reports continuing outpatient physical therapy with good improvement of strength. He has intermittent medial knee discomfort, worsened with standing for prolonged periods of time.  [de-identified] : Exam of the right knee shows portal sites healed, no effusion, 0 to 130 degrees of flexion measured with a goniometer. Full extension. No pain with flexion. 5/5 motor strength bilaterally distally. Sensation intact distally.  [de-identified] : The patient is doing well 6 weeks following right knee partial medial meniscectomy. The patient will gradually progress toward home exercises. He may utilize over the counter Voltaren gel as instructed for anti-inflammatory purposes. Overall the patient is making excellent progress and I would expect that his discomfort which is related to prolonged standing will improve with more time. As long as he continue to improve, he may follow up as needed.

## 2022-04-15 NOTE — ADDENDUM
[FreeTextEntry1] : This note was authored by Shante Bermudez working as a medical scribe for Dr. Red Chapin. The note was reviewed, edited, and revised by Dr. Red Chapin whom is in agreement with the exam findings, imaging findings, and treatment plan. 04/15/2022		\par

## 2022-04-25 ENCOUNTER — RX RENEWAL (OUTPATIENT)
Age: 62
End: 2022-04-25

## 2022-06-03 NOTE — CONSULT NOTE ADULT - CONSULT REQUESTED BY NAME
Pt resting as manifested by eyes closed in dark room. Respirations even and easy. Call light and bedside table in reach. Bed in low locked position. Denies any needs at this time. Dr. Aguirre

## 2022-09-09 ENCOUNTER — EMERGENCY (EMERGENCY)
Facility: HOSPITAL | Age: 62
LOS: 1 days | Discharge: HOME | End: 2022-09-09
Attending: STUDENT IN AN ORGANIZED HEALTH CARE EDUCATION/TRAINING PROGRAM
Payer: COMMERCIAL

## 2022-09-09 VITALS
RESPIRATION RATE: 18 BRPM | TEMPERATURE: 98 F | OXYGEN SATURATION: 96 % | HEART RATE: 78 BPM | DIASTOLIC BLOOD PRESSURE: 65 MMHG | SYSTOLIC BLOOD PRESSURE: 100 MMHG

## 2022-09-09 VITALS
DIASTOLIC BLOOD PRESSURE: 40 MMHG | OXYGEN SATURATION: 95 % | WEIGHT: 220.02 LBS | HEART RATE: 83 BPM | TEMPERATURE: 98 F | SYSTOLIC BLOOD PRESSURE: 80 MMHG | HEIGHT: 74 IN | RESPIRATION RATE: 18 BRPM

## 2022-09-09 DIAGNOSIS — Z98.890 OTHER SPECIFIED POSTPROCEDURAL STATES: Chronic | ICD-10-CM

## 2022-09-09 DIAGNOSIS — Z90.49 ACQUIRED ABSENCE OF OTHER SPECIFIED PARTS OF DIGESTIVE TRACT: Chronic | ICD-10-CM

## 2022-09-09 LAB
ALBUMIN SERPL ELPH-MCNC: 4 G/DL — SIGNIFICANT CHANGE UP (ref 3.3–5.2)
ALP SERPL-CCNC: 69 U/L — SIGNIFICANT CHANGE UP (ref 40–120)
ALT FLD-CCNC: 22 U/L — SIGNIFICANT CHANGE UP
ANION GAP SERPL CALC-SCNC: 13 MMOL/L — SIGNIFICANT CHANGE UP (ref 5–17)
APTT BLD: 33.5 SEC — SIGNIFICANT CHANGE UP (ref 27.5–35.5)
AST SERPL-CCNC: 19 U/L — SIGNIFICANT CHANGE UP
BASE EXCESS BLDV CALC-SCNC: -1.1 MMOL/L — SIGNIFICANT CHANGE UP (ref -2–3)
BASOPHILS # BLD AUTO: 0.02 K/UL — SIGNIFICANT CHANGE UP (ref 0–0.2)
BASOPHILS NFR BLD AUTO: 0.1 % — SIGNIFICANT CHANGE UP (ref 0–2)
BILIRUB SERPL-MCNC: 2 MG/DL — SIGNIFICANT CHANGE UP (ref 0.4–2)
BLD GP AB SCN SERPL QL: SIGNIFICANT CHANGE UP
BUN SERPL-MCNC: 15.6 MG/DL — SIGNIFICANT CHANGE UP (ref 8–20)
CA-I SERPL-SCNC: 1.13 MMOL/L — LOW (ref 1.15–1.33)
CALCIUM SERPL-MCNC: 9.2 MG/DL — SIGNIFICANT CHANGE UP (ref 8.4–10.5)
CHLORIDE BLDV-SCNC: 102 MMOL/L — SIGNIFICANT CHANGE UP (ref 98–107)
CHLORIDE SERPL-SCNC: 101 MMOL/L — SIGNIFICANT CHANGE UP (ref 98–107)
CO2 SERPL-SCNC: 22 MMOL/L — SIGNIFICANT CHANGE UP (ref 22–29)
CREAT SERPL-MCNC: 1.24 MG/DL — SIGNIFICANT CHANGE UP (ref 0.5–1.3)
EGFR: 65 ML/MIN/1.73M2 — SIGNIFICANT CHANGE UP
EOSINOPHIL # BLD AUTO: 0 K/UL — SIGNIFICANT CHANGE UP (ref 0–0.5)
EOSINOPHIL NFR BLD AUTO: 0 % — SIGNIFICANT CHANGE UP (ref 0–6)
GAS PNL BLDV: 133 MMOL/L — LOW (ref 136–145)
GAS PNL BLDV: SIGNIFICANT CHANGE UP
GLUCOSE BLDV-MCNC: 132 MG/DL — HIGH (ref 70–99)
GLUCOSE SERPL-MCNC: 129 MG/DL — HIGH (ref 70–99)
HCO3 BLDV-SCNC: 24 MMOL/L — SIGNIFICANT CHANGE UP (ref 22–29)
HCT VFR BLD CALC: 43.4 % — SIGNIFICANT CHANGE UP (ref 39–50)
HCT VFR BLDA CALC: 45 % — SIGNIFICANT CHANGE UP
HGB BLD CALC-MCNC: 15.1 G/DL — SIGNIFICANT CHANGE UP (ref 12.6–17.4)
HGB BLD-MCNC: 14.8 G/DL — SIGNIFICANT CHANGE UP (ref 13–17)
IMM GRANULOCYTES NFR BLD AUTO: 0.5 % — SIGNIFICANT CHANGE UP (ref 0–1.5)
INR BLD: 1.61 RATIO — HIGH (ref 0.88–1.16)
LACTATE BLDV-MCNC: 1.5 MMOL/L — SIGNIFICANT CHANGE UP (ref 0.5–2)
LIDOCAIN IGE QN: 26 U/L — SIGNIFICANT CHANGE UP (ref 22–51)
LYMPHOCYTES # BLD AUTO: 1.05 K/UL — SIGNIFICANT CHANGE UP (ref 1–3.3)
LYMPHOCYTES # BLD AUTO: 7.3 % — LOW (ref 13–44)
MCHC RBC-ENTMCNC: 29.8 PG — SIGNIFICANT CHANGE UP (ref 27–34)
MCHC RBC-ENTMCNC: 34.1 GM/DL — SIGNIFICANT CHANGE UP (ref 32–36)
MCV RBC AUTO: 87.3 FL — SIGNIFICANT CHANGE UP (ref 80–100)
MONOCYTES # BLD AUTO: 1.27 K/UL — HIGH (ref 0–0.9)
MONOCYTES NFR BLD AUTO: 8.9 % — SIGNIFICANT CHANGE UP (ref 2–14)
NEUTROPHILS # BLD AUTO: 11.92 K/UL — HIGH (ref 1.8–7.4)
NEUTROPHILS NFR BLD AUTO: 83.2 % — HIGH (ref 43–77)
PCO2 BLDV: 43 MMHG — SIGNIFICANT CHANGE UP (ref 42–55)
PH BLDV: 7.36 — SIGNIFICANT CHANGE UP (ref 7.32–7.43)
PLATELET # BLD AUTO: 190 K/UL — SIGNIFICANT CHANGE UP (ref 150–400)
PO2 BLDV: 54 MMHG — HIGH (ref 25–45)
POTASSIUM BLDV-SCNC: 4.1 MMOL/L — SIGNIFICANT CHANGE UP (ref 3.5–5.1)
POTASSIUM SERPL-MCNC: 4 MMOL/L — SIGNIFICANT CHANGE UP (ref 3.5–5.3)
POTASSIUM SERPL-SCNC: 4 MMOL/L — SIGNIFICANT CHANGE UP (ref 3.5–5.3)
PROT SERPL-MCNC: 7 G/DL — SIGNIFICANT CHANGE UP (ref 6.6–8.7)
PROTHROM AB SERPL-ACNC: 18.8 SEC — HIGH (ref 10.5–13.4)
RBC # BLD: 4.97 M/UL — SIGNIFICANT CHANGE UP (ref 4.2–5.8)
RBC # FLD: 12.8 % — SIGNIFICANT CHANGE UP (ref 10.3–14.5)
SAO2 % BLDV: 81.2 % — SIGNIFICANT CHANGE UP
SODIUM SERPL-SCNC: 136 MMOL/L — SIGNIFICANT CHANGE UP (ref 135–145)
WBC # BLD: 14.33 K/UL — HIGH (ref 3.8–10.5)
WBC # FLD AUTO: 14.33 K/UL — HIGH (ref 3.8–10.5)

## 2022-09-09 PROCEDURE — 84295 ASSAY OF SERUM SODIUM: CPT

## 2022-09-09 PROCEDURE — 85730 THROMBOPLASTIN TIME PARTIAL: CPT

## 2022-09-09 PROCEDURE — 85610 PROTHROMBIN TIME: CPT

## 2022-09-09 PROCEDURE — 82435 ASSAY OF BLOOD CHLORIDE: CPT

## 2022-09-09 PROCEDURE — 82803 BLOOD GASES ANY COMBINATION: CPT

## 2022-09-09 PROCEDURE — 80053 COMPREHEN METABOLIC PANEL: CPT

## 2022-09-09 PROCEDURE — 85014 HEMATOCRIT: CPT

## 2022-09-09 PROCEDURE — 85018 HEMOGLOBIN: CPT

## 2022-09-09 PROCEDURE — 83605 ASSAY OF LACTIC ACID: CPT

## 2022-09-09 PROCEDURE — 85025 COMPLETE CBC W/AUTO DIFF WBC: CPT

## 2022-09-09 PROCEDURE — 36415 COLL VENOUS BLD VENIPUNCTURE: CPT

## 2022-09-09 PROCEDURE — 86850 RBC ANTIBODY SCREEN: CPT

## 2022-09-09 PROCEDURE — 99284 EMERGENCY DEPT VISIT MOD MDM: CPT | Mod: 25

## 2022-09-09 PROCEDURE — 84132 ASSAY OF SERUM POTASSIUM: CPT

## 2022-09-09 PROCEDURE — 96360 HYDRATION IV INFUSION INIT: CPT | Mod: XU

## 2022-09-09 PROCEDURE — 99285 EMERGENCY DEPT VISIT HI MDM: CPT

## 2022-09-09 PROCEDURE — 71045 X-RAY EXAM CHEST 1 VIEW: CPT | Mod: 26

## 2022-09-09 PROCEDURE — 82330 ASSAY OF CALCIUM: CPT

## 2022-09-09 PROCEDURE — 74174 CTA ABD&PLVS W/CONTRAST: CPT | Mod: 26,MA

## 2022-09-09 PROCEDURE — 71045 X-RAY EXAM CHEST 1 VIEW: CPT

## 2022-09-09 PROCEDURE — 82947 ASSAY GLUCOSE BLOOD QUANT: CPT

## 2022-09-09 PROCEDURE — 83690 ASSAY OF LIPASE: CPT

## 2022-09-09 PROCEDURE — 86901 BLOOD TYPING SEROLOGIC RH(D): CPT

## 2022-09-09 PROCEDURE — 74174 CTA ABD&PLVS W/CONTRAST: CPT | Mod: MA

## 2022-09-09 PROCEDURE — 86900 BLOOD TYPING SEROLOGIC ABO: CPT

## 2022-09-09 RX ORDER — SODIUM CHLORIDE 9 MG/ML
1000 INJECTION INTRAMUSCULAR; INTRAVENOUS; SUBCUTANEOUS ONCE
Refills: 0 | Status: COMPLETED | OUTPATIENT
Start: 2022-09-09 | End: 2022-09-09

## 2022-09-09 RX ADMIN — SODIUM CHLORIDE 1000 MILLILITER(S): 9 INJECTION INTRAMUSCULAR; INTRAVENOUS; SUBCUTANEOUS at 16:09

## 2022-09-09 NOTE — ED PROVIDER NOTE - ATTENDING CONTRIBUTION TO CARE
Pt co lower abd pain that is aching, nonradiating. no modifying factors.  no sob. no fever/chills. no other complaints. symptoms started today.    physical - rrr. ctab. abd - soft, minimal lower abd ttp. no rebound. no guarding. no edema. no rash.    plan - labs and imaging reviewed. pt with diverticulitis. pt feeling much better. pt reassured and instructed to f/up with pcp. given return instructions.

## 2022-09-09 NOTE — ED ADULT TRIAGE NOTE - CHIEF COMPLAINT QUOTE
sent by MD for CT of abdomen; c/o bilateral groin pain "sharp pain" , "feeling distended in stomach" + N/V/D on Thursday (which has subsided), hx umbilical hernia. pt reports he is able to tolerate solids/liquids. pt BP left arm 80/40, right arm 96/62. brought to  and clinically upgraded.

## 2022-09-09 NOTE — ED PROVIDER NOTE - PATIENT PORTAL LINK FT
You can access the FollowMyHealth Patient Portal offered by [facility] by registering at the following website: [email or web address]. By joining [employer]’s FollowMyHealth portal, you will also be able to view your health information using other applications (apps) compatible with our system.

## 2022-09-09 NOTE — ED ADULT TRIAGE NOTE - HISTORY OF COVID-19 VACCINATION
Patient is requesting medication refill. 90day supply.  Please approve or deny this request.    Rx requested:  Requested Prescriptions     Pending Prescriptions Disp Refills    thiamine 100 MG tablet 90 tablet 1     Sig: Take 1 tablet by mouth daily         Last Office Visit:   7/8/2022      Next Visit Date:  Future Appointments   Date Time Provider Faith Alejandre   7/15/2022 10:00 AM [address] [facility] Nany Garcia   9/6/2022  9:00 [facility]   1/13/2023  9:15 AM Nam Bearden  [facility] Vaccine status unknown

## 2022-09-09 NOTE — ED PROVIDER NOTE - PROGRESS NOTE DETAILS
labs reviewed. CT shows diverticulitis. stable on reassessment. with improved BP after ivf. will dc with abx and pcp follow up. -DO Kamila

## 2022-09-09 NOTE — ED PROVIDER NOTE - CLINICAL SUMMARY MEDICAL DECISION MAKING FREE TEXT BOX
61y Male with multiple days of non-radiating lower abdominal pain without chest pain, back pain, shortness of breath, nausea, vomiting, syncope. Hypotensive, saturating well, neurovascularly intact. Labs, CTA, ivf, reassess.

## 2022-09-09 NOTE — ED PROVIDER NOTE - OBJECTIVE STATEMENT
61y Male with history of HTN, afib (on eliquis), GERD presenting with b/l groin pain x 2 days with no chest pain, back pain, shortness of breath, nausea, vomiting, syncope. Patient noted to be hypotensive in triage.

## 2022-09-09 NOTE — ED ADULT NURSE NOTE - NS ED NOTE ABUSE SUSPICION NEGLECT YN
Physical Therapy Evaluation    Visit Count: 1   Plan of Care: 9/26/2019 Through: 12/23/2019  Insurance Information: physical and speech therapy combined cap of $2040 per calendar year  Referred by: Jozef Harris PA-C; Next provider visit (if known/scheduled): 10/3/19  Medical Diagnosis (from order):   Diagnosis Information      Diagnosis    780.4 (ICD-9-CM) - R42 (ICD-10-CM) - Dizziness    723.1 (ICD-9-CM) - M54.2 (ICD-10-CM) - Cervicalgia              Treatment Diagnosis: Neck symptoms with dysequilibrium, impaired posture, impaired range of motion and radiating pain    Date of onset/injury: 2017  Diagnosis Precautions: suspect thyroid nodule  Chart reviewed at time of initial evaluation (relevant co-morbidities, allergies, tests and medications listed):   Active Ambulatory Problems     Diagnosis Date Noted   • Vitreous hemorrhage, left eye (CMS/HCC) 05/12/2014   • retinal break s/p retinopexy left eye 05/12/2014   • History of breast cancer 09/16/2004   • Breast cancer in situ, right 07/19/2014   • Thyroid nodule 02/02/2017   • Left foot pain 10/01/2018   • Psoriasis 12/10/2018   • Benign paroxysmal positional vertigo 08/16/2019     Resolved Ambulatory Problems     Diagnosis Date Noted   • No Resolved Ambulatory Problems     Past Medical History:   Diagnosis Date   • Malignant neoplasm (CMS/HCC)          SUBJECTIVE   Description of Problem and/or Mechanism of Injury: Patient reports that she is been struggling with left neck stiffness. She is also had some issues with vertigo. Patient states she is also currently scheduled for testing for a thyroid nodule this week and VNG testing. Patient states her greatest complaint is when she needs to sit in the bleachers watching a volleyball game of her granddaughter and will have significant back pain.  Current Symptoms:   Pain: 2/10  Auditory Symptoms: None   Visual Symptoms: Light sensitivity due to retinal tears in the left eye in 2014  Corrective Lenses:  Bifocals  Recent change in medication: No   Vertigo Medication: None  Migraines/Headache/eye strain: No  History of concussion: No  Description of dizziness: Imbalance/disequilibrium   Duration of Symptoms: Less than a few seconds    Function:  Limitations and exacerbating factors: difficulty with sleeping; turning her head while watching a volleyball game  Prior level: Patient is able to perform head movement activities without difficulty.  Patient Goals: Patient would like to feel confident with head turning activities.    Prior Treatment: no therapies in the past year for current condition. Hospitalization, home health services or skilled nursing facility in the last 30 days: No, per patient.  Home Environment/Social Support: Patient lives with significant other.  Patient has consistent assist from family/friends.    Safety:  Do you feel safe at home, work and/or school? yes, per patient  Patient denies 2 or more falls or an unexplained fall with injury in the last year, further testing not required     OBJECTIVE   Posture/Observation/Gait:  Ocular alignment: Normal  Cervical positioning/posture: Decreased cervical lordosis with slight forward head and anteriorly rotated shoulders bilateral  Gait: Within normal limits for step length and stride except decreased left arm swing    Special Tests:  LeftRightModified Vertebral Artery Screen for vertebral artery insufficiency    Alar Ligament screen in sitting (neutral, flexion, extension) for ligamentous instability at C1/C2    Sharp-Barby Test for transverse ligament instability    Positive test indicated with an \"X\"      Cervical Range of Motion (degrees)  Date Initial    Flexion (80-90) 90   Extension (70) 70   Lateral Flexion Left (20-45) 30   Lateral Flexion Right (20-45) 35   Rotation Left (70-90)  45*   Rotation Rigth (70-90)  70   standard testing positions unless otherwise noted; ranges are reported in active range of motion unless noted AA=active assistive  range of motion and P=passive range of motion; norms included in (); * =pain  All motions within functional/normal limits except as noted.         Oculomotor Exam   Present Comments   Spontaneous Nystagmus     Gaze Holding Nystagmus         Left         Right         Up         Down      Abnormal    Smooth Pursuits     Saccade     Impairment findings indicated with an \"X\"    Palpation:  Hypertrophy to left upper trapezius    Elevated left 1st rib    Joint Assessment:  Hypomobility T1-T3    Outcome Measures: (Outcome Scoring)  Dizziness Handicap Inventory: 20 (scored 0-100, higher score indicates higher impairment)      Initial Treatment   Initial evaluation completed.    Therapeutic Exercise:   Exercise Repetitions Sets Position/Cues   Chin tuck x5  H   Supine wand flexion  x10   H   Cervical flexion + rotation r/l x5  H                                       Comments: Handouts given. H= added to home exercise program.      Manual Therapy:   Cervical long axis distraction grade III  posterior/anterior glides grade II mid lower cervical  1st rib left mobilization grade II  mobilizations with movement left Cervical rotation: 25% improvement    Therapeutic Activity:  Patient education: Educated patient on the importance of head and upper back posture with sitting and standing activities. Discussed strategies on how to be in a better position for watching volleyball games. Encourage patient to remain active with walking daily.  Patient verbalizes understanding and demonstrates understanding of education.    Skilled input: Patient was instructed in home exercise program with tactile and verbal cueing for proper muscle contractions, modalities at home, positions for comfort, and activity modification.  Patient has been informed and consents to treatment by myself and aides as needed.       Writer verbally educated the patient and received verbal consent from the patient on hand placement, positioning of patient, and  techniques to be performed today including therapist position for techniques as described above and how they are pertinent to the patient's plan of care.     Suggestions for next session as indicated: progress per plan of care, Dry needling procedure     ASSESSMENT   66 year old female patient has signs and symptoms consistent with M54.2 Cervicalgia  (primary encounter diagnosis)  R42 Dizziness and giddiness  that has reported functional limitations listed above. Patient presents with restricted cervical range of motion of left rotation. Patient gets some symptoms of slight dizziness when she turns from right to left with her head. Patient had good relief of movement restriction after performing manual therapy on her today. Patient was given home exercises to begin with. Recommend patient could benefit from continued therapy to work on dizziness and maintaining good cervical range of motion for daily activities.    Patient will benefit from skilled therapy and rehab potential is good based on assessment above   Predicted patient presentation: Moderate (evolving) - Patient comorbidities and complexities, as defined above, may have varying impact on steady progress for prescribed plan of care.    PLAN   Goals: To be obtained by end of this plan of care:  1. Patient independent with modified and progressed home exercise program.  2. Patient will subjectively report a 75% improvement of symptoms of dizziness, nausea, dyequilibrium with head motions and position changes  3. Improve involved range of motion to left rotation 65° cervical  through improvements listed above patient will:  4. Be able to sit for 60 minutes with minimal pain/difficulty to improve returning to community activities  5. Dizziness Handicap Inventory: Patient will complete form to reflect an improved score from initial score of 20 to less than or equal to 15 to indicate patient reported improvement in function/disability/impairment (minimal  detectable change: 17 points).    The following skilled interventions to be implemented to achieve above:  Dry Needling - Unlisted physical medicine/rehabilitation service or procedure (26669/52509.02)  Manual Therapy (34018)  Neuromuscular Re-Education (05767)  Therapeutic Activity (66644)  Therapeutic Exercise (89633)  Electrical Stimulation (97343//50235)   Mechanical Traction (78466)  Ultrasound/Phonophoresis (43419)    Frequency/Duration: 1 times per week for 6 weeks with tapering as the patient progresses for an estimated total of 6 visits    patient involved in and agreed to plan of care and goals.   Attendance policy provided at time of evaluation.      Patient Education:   Who will be receiving education: patient  Are they ready to learn: yes  Preferred learning style: written, verbal, demonstration  Barriers to learning: no barriers apparent at this time   Result of initial outlined education: Verbalizes understanding and Demonstrates understanding    THERAPY DAILY BILLING   Insurance: 1. MEDICARE 2. [employer]    Evaluation Procedures:  Physical Therapy Evaluation: Moderate Complexity    Timed Procedures:  Manual Therapy, 20 minutes    Untimed Procedures:  No untimed codes were used on this date of service    Total Treatment Time: 60 minutes        The referring provider's electronic or written signature on the evaluation authorizes the therapy plan of care and certifies the need for these services, furnished under this plan of care while under their care.  Electronically sent for referring provider signature   No

## 2022-09-09 NOTE — ED ADULT NURSE NOTE - OBJECTIVE STATEMENT
Pt comes in complaining of B/L groin pain. Pt comes in comlaining Pt comes in complaining of B/L groin pain. Pt denies any other symptoms. Pt noted to be hypotensive in triage and placed in CC upon entering ER. Pt able to ambulate without assistance.

## 2022-09-10 NOTE — DISCHARGE NOTE ADULT - VISION (WITH CORRECTIVE LENSES IF THE PATIENT USUALLY WEARS THEM):
Partially impaired: cannot see medication labels or newsprint, but can see obstacles in path, and the surrounding layout; can count fingers at arm's length
calm

## 2022-09-15 ENCOUNTER — APPOINTMENT (OUTPATIENT)
Dept: CARDIOLOGY | Facility: CLINIC | Age: 62
End: 2022-09-15

## 2022-09-15 ENCOUNTER — NON-APPOINTMENT (OUTPATIENT)
Age: 62
End: 2022-09-15

## 2022-09-15 VITALS
TEMPERATURE: 98.7 F | HEART RATE: 79 BPM | OXYGEN SATURATION: 98 % | HEIGHT: 74 IN | DIASTOLIC BLOOD PRESSURE: 60 MMHG | SYSTOLIC BLOOD PRESSURE: 80 MMHG | BODY MASS INDEX: 27.72 KG/M2 | WEIGHT: 216 LBS

## 2022-09-15 VITALS — SYSTOLIC BLOOD PRESSURE: 89 MMHG | DIASTOLIC BLOOD PRESSURE: 60 MMHG

## 2022-09-15 DIAGNOSIS — I42.2 OTHER HYPERTROPHIC CARDIOMYOPATHY: ICD-10-CM

## 2022-09-15 DIAGNOSIS — I48.4 ATYPICAL ATRIAL FLUTTER: ICD-10-CM

## 2022-09-15 PROCEDURE — 99214 OFFICE O/P EST MOD 30 MIN: CPT

## 2022-09-15 PROCEDURE — 93000 ELECTROCARDIOGRAM COMPLETE: CPT

## 2022-09-15 RX ORDER — OXYCODONE AND ACETAMINOPHEN 5; 325 MG/1; MG/1
5-325 TABLET ORAL
Qty: 42 | Refills: 0 | Status: DISCONTINUED | COMMUNITY
Start: 2022-03-02 | End: 2022-09-15

## 2022-09-16 ENCOUNTER — APPOINTMENT (OUTPATIENT)
Dept: CARDIOLOGY | Facility: CLINIC | Age: 62
End: 2022-09-16

## 2022-09-16 PROCEDURE — 93306 TTE W/DOPPLER COMPLETE: CPT

## 2022-09-16 RX ADMIN — PERFLUTREN MG/ML: 6.52 INJECTION, SUSPENSION INTRAVENOUS at 00:00

## 2022-09-17 PROBLEM — I48.4 ATYPICAL ATRIAL FLUTTER: Status: ACTIVE | Noted: 2019-09-10

## 2022-09-17 PROBLEM — I42.2 ASYMMETRIC SEPTAL HYPERTROPHY: Status: ACTIVE | Noted: 2022-02-17

## 2022-09-17 NOTE — REVIEW OF SYSTEMS
[Weight Loss (___ Lbs)] : [unfilled] ~Ulb weight loss [Abdominal Pain] : abdominal pain [Change In The Stool] : change in stool [Negative] : Heme/Lymph [Weight Gain (___ Lbs)] : no recent weight gain [Dyspnea on exertion] : not dyspnea during exertion

## 2022-09-17 NOTE — HISTORY OF PRESENT ILLNESS
[FreeTextEntry1] : 61-year-old male who is here for follow-up due to asymptomatic atrial flutter, prior history of DVT, dilated aortic root.\par \par He Is a status post RF ablation for atrial flutter on 6/27/2019 and pulmonary vein isolation and CTI.  Recurrent failed DC cardioversion despite the use of ibutilide.  Patient is currently on rate control and anticoagulated and denies any significant symptoms.  He denies any chest pain or shortness of breath.  He denies any bleeding.  Compliant with all his medications\par \par He was recently diagnosed with diverticulitis and is on antibiotics.  Denies any syncope or presyncope.  No leg edema.\par \par EKG with atrial flutter, ventricular rate of 70 bpm, nonspecific ST changes.\par \par Echocardiogram which was performed on September 2021 demonstrated normal LV size and function, LVEF of 59%, severe septal left ventricular hypertrophy, aortic root at sinus of Valsalva was 4 cm.

## 2022-09-17 NOTE — ASSESSMENT
[FreeTextEntry1] : Atrial flutter, status post ablation, rate controlled.\par Medications renewed.\par Active without any chest discomfort or shortness of breath\par Repeat echocardiogram, asymmetric septal hypertrophy without increasing gradients.  No syncope or presyncope.\par Continue with anticoagulant\par .  Repeat echo with aortic root dilation.\par Patient was advised to partake in 150 minutes of moderate exercise per week.\par patient was advised to see us in 6 months if stable and certainly earlier with any new symptoms.\par

## 2022-09-17 NOTE — PHYSICAL EXAM
[Well Developed] : well developed [Well Nourished] : well nourished [No Acute Distress] : no acute distress [Normal Conjunctiva] : normal conjunctiva [Normal Venous Pressure] : normal venous pressure [No Carotid Bruit] : no carotid bruit [Normal S1, S2] : normal S1, S2 [No Murmur] : no murmur [No Gallop] : no gallop [Clear Lung Fields] : clear lung fields [Good Air Entry] : good air entry [No Respiratory Distress] : no respiratory distress  [Soft] : abdomen soft [Non Tender] : non-tender [No Masses/organomegaly] : no masses/organomegaly [Normal Bowel Sounds] : normal bowel sounds [Normal Gait] : normal gait [No Edema] : no edema [No Cyanosis] : no cyanosis [No Clubbing] : no clubbing [No Varicosities] : no varicosities [No Rash] : no rash [No Skin Lesions] : no skin lesions [Moves all extremities] : moves all extremities [No Focal Deficits] : no focal deficits [Normal Speech] : normal speech [Alert and Oriented] : alert and oriented [Normal memory] : normal memory

## 2022-09-19 RX ORDER — PERFLUTREN 6.52 MG/ML
6.52 INJECTION, SUSPENSION INTRAVENOUS
Qty: 2 | Refills: 0 | Status: COMPLETED | OUTPATIENT
Start: 2022-09-16

## 2022-09-23 ENCOUNTER — TRANSCRIPTION ENCOUNTER (OUTPATIENT)
Age: 62
End: 2022-09-23

## 2023-02-06 ENCOUNTER — APPOINTMENT (OUTPATIENT)
Dept: CARDIOLOGY | Facility: CLINIC | Age: 63
End: 2023-02-06
Payer: COMMERCIAL

## 2023-02-06 ENCOUNTER — NON-APPOINTMENT (OUTPATIENT)
Age: 63
End: 2023-02-06

## 2023-02-06 VITALS
HEART RATE: 67 BPM | TEMPERATURE: 98.7 F | BODY MASS INDEX: 28.49 KG/M2 | HEIGHT: 74 IN | SYSTOLIC BLOOD PRESSURE: 107 MMHG | WEIGHT: 222 LBS | DIASTOLIC BLOOD PRESSURE: 67 MMHG | OXYGEN SATURATION: 97 %

## 2023-02-06 DIAGNOSIS — Z79.01 LONG TERM (CURRENT) USE OF ANTICOAGULANTS: ICD-10-CM

## 2023-02-06 DIAGNOSIS — R00.2 PALPITATIONS: ICD-10-CM

## 2023-02-06 PROCEDURE — 99215 OFFICE O/P EST HI 40 MIN: CPT

## 2023-02-06 PROCEDURE — 93000 ELECTROCARDIOGRAM COMPLETE: CPT

## 2023-02-06 NOTE — HISTORY OF PRESENT ILLNESS
[FreeTextEntry1] : 62-year-old male who is here for follow-up due to asymptomatic atrial flutter, prior history of DVT, dilated aortic root.\par \par He Is a status post RF ablation for atrial flutter on 6/27/2019 and pulmonary vein isolation and CTI.  Recurrent failed DC cardioversion despite the use of ibutilide.  Patient is currently on rate control and anticoagulated and denies any significant symptoms.  He denies any chest pain or shortness of breath.  He denies any bleeding.  Compliant with all his medications\par \par 2/6/23: feels well and has no complaints. he is to go for lithotripsy surgery soon. Denies chest pains or shortness of breath at rest or with exertion. Able to ascend multiple flights of stairs without issue. Remains in Aflutter, rate controlled. No palpitations. \par \par ECG today A flutter \par \par Echo 9/2022: Normal LVEF without wall motion abnormalities. Aortic root 4.5 cm, increased from 4.1

## 2023-02-06 NOTE — REVIEW OF SYSTEMS
[Weight Gain (___ Lbs)] : no recent weight gain [Weight Loss (___ Lbs)] : [unfilled] ~Ulb weight loss [Dyspnea on exertion] : not dyspnea during exertion [Abdominal Pain] : abdominal pain [Change In The Stool] : change in stool [Negative] : Heme/Lymph

## 2023-02-06 NOTE — DISCUSSION/SUMMARY
[FreeTextEntry1] : #preopeartive assessment: Low risk patient (RCRI 0) undergoing a moderate risk surgery. He can perform 4 METS with ease without stopping. No further cardiac workup required prior to surgery. Stop eliquis three days prior to surgery, please resume as soon as possible thereafter. \par \par #Aortic root dilation/aneurysm: 4.5 cm on recent Echo, increased from 4.1 cm last year\par - Conitnue metoprolol\par - continue diltiazem\par - hypotension precludes ability to start ARB\par - CT-A to characterize, followed by referral to CT surgery \par \par #Atrial flutter: chronic, asymptomatic. S/p failed CTI and DCCV\par - continue metop, dilt\par - conitnue eliquis\par \par FUV In 6 mo.\par \par \par Patient was advised to partake in 150 minutes of moderate exercise per week.\par patient was advised to see us in 6 months if stable and certainly earlier with any new symptoms.\par  [EKG obtained to assist in diagnosis and management of assessed problem(s)] : EKG obtained to assist in diagnosis and management of assessed problem(s)

## 2023-02-13 ENCOUNTER — OUTPATIENT (OUTPATIENT)
Dept: OUTPATIENT SERVICES | Facility: HOSPITAL | Age: 63
LOS: 1 days | End: 2023-02-13
Payer: COMMERCIAL

## 2023-02-13 ENCOUNTER — APPOINTMENT (OUTPATIENT)
Dept: CT IMAGING | Facility: CLINIC | Age: 63
End: 2023-02-13
Payer: COMMERCIAL

## 2023-02-13 DIAGNOSIS — Z98.890 OTHER SPECIFIED POSTPROCEDURAL STATES: Chronic | ICD-10-CM

## 2023-02-13 DIAGNOSIS — I71.9 AORTIC ANEURYSM OF UNSPECIFIED SITE, WITHOUT RUPTURE: ICD-10-CM

## 2023-02-13 DIAGNOSIS — Z90.49 ACQUIRED ABSENCE OF OTHER SPECIFIED PARTS OF DIGESTIVE TRACT: Chronic | ICD-10-CM

## 2023-02-13 PROCEDURE — 71275 CT ANGIOGRAPHY CHEST: CPT | Mod: 26

## 2023-02-13 PROCEDURE — 71275 CT ANGIOGRAPHY CHEST: CPT

## 2023-02-23 NOTE — DISCUSSION/SUMMARY
Called patient to schedule appointment for iron infusion order by Dr Betty Still. Patient did not answer and message was left on voicemail.
[de-identified] : MELANIA NICKERSON is a 60 year male who presents with mild to moderate right knee medial compartment arthritis and exam and history concerning for a meniscus tear.  I have sent the patient for an MRI to evaluate for a possible meniscal tear. The patient received a cortisone injection in the right knee  in the office today.  Follow-up in the office once imaging is completed.

## 2023-03-13 NOTE — ED PROVIDER NOTE - NEURO NEGATIVE STATEMENT, MLM
88.5
no loss of consciousness, no gait abnormality, no headache, no sensory deficits, and no weakness.

## 2023-04-07 NOTE — ED STATDOCS - CHPI ED SEVERITY
Group Topic: BH Activity Group    Date: 4/6/2023  Start Time: 2010  End Time: 2100  Facilitators: Hi Montiel CNA    Focus: ASHER leaders  Number in attendance: 9    Method: Group   Attendance: Present  Participation: Moderate       MILD

## 2023-05-01 NOTE — ED ADULT NURSE NOTE - PAIN RATING/NUMBER SCALE (0-10): ACTIVITY
5
balance training/bed mobility training/gait training/motor coordination training/neuromuscular re-education/ROM/strengthening/transfer training

## 2023-05-30 NOTE — HISTORY OF PRESENT ILLNESS
[FreeTextEntry1] : 60-year-old male with history of provoked DVT, symptomatic atrial flutter, status post RF ablation for A. fib on 6/27/2019 with pulmonary vein isolation and CTI performed by Dr. Aaron  with recurrent failed DC cardioversion afterwards despite ibutilide.  He remains in atrial flutter.  He is on high dose of metoprolol and last visit we added Cardizem.  We advised him to see EP for further recommendation on ambulation.  \par He had a successful colonoscopy while off anticoagulation, no issues.  Is now back on Eliquis.  He is compliant with medications.  He is short of breath with exertion.  Heart failure rate at rest is normal.\par \par EKG was not done today. Awake/Alert/Cooperative

## 2023-07-17 ENCOUNTER — APPOINTMENT (OUTPATIENT)
Dept: VASCULAR SURGERY | Facility: CLINIC | Age: 63
End: 2023-07-17
Payer: COMMERCIAL

## 2023-07-17 PROCEDURE — 93971 EXTREMITY STUDY: CPT

## 2023-08-03 ENCOUNTER — APPOINTMENT (OUTPATIENT)
Dept: CARDIOLOGY | Facility: CLINIC | Age: 63
End: 2023-08-03
Payer: COMMERCIAL

## 2023-08-03 VITALS
DIASTOLIC BLOOD PRESSURE: 66 MMHG | WEIGHT: 230 LBS | SYSTOLIC BLOOD PRESSURE: 110 MMHG | TEMPERATURE: 97.9 F | BODY MASS INDEX: 29.53 KG/M2 | HEART RATE: 66 BPM | OXYGEN SATURATION: 96 %

## 2023-08-03 DIAGNOSIS — I48.91 UNSPECIFIED ATRIAL FIBRILLATION: ICD-10-CM

## 2023-08-03 DIAGNOSIS — R06.09 OTHER FORMS OF DYSPNEA: ICD-10-CM

## 2023-08-03 PROCEDURE — 99215 OFFICE O/P EST HI 40 MIN: CPT

## 2023-08-03 PROCEDURE — 93000 ELECTROCARDIOGRAM COMPLETE: CPT

## 2023-08-03 RX ORDER — TAMSULOSIN HYDROCHLORIDE 0.4 MG/1
0.4 CAPSULE ORAL DAILY
Refills: 0 | Status: DISCONTINUED | COMMUNITY
End: 2023-08-03

## 2023-08-22 ENCOUNTER — APPOINTMENT (OUTPATIENT)
Dept: CARDIOLOGY | Facility: CLINIC | Age: 63
End: 2023-08-22
Payer: COMMERCIAL

## 2023-08-22 ENCOUNTER — NON-APPOINTMENT (OUTPATIENT)
Age: 63
End: 2023-08-22

## 2023-08-22 ENCOUNTER — RX RENEWAL (OUTPATIENT)
Age: 63
End: 2023-08-22

## 2023-08-22 VITALS
WEIGHT: 226 LBS | BODY MASS INDEX: 29.02 KG/M2 | OXYGEN SATURATION: 96 % | SYSTOLIC BLOOD PRESSURE: 100 MMHG | DIASTOLIC BLOOD PRESSURE: 78 MMHG | HEART RATE: 132 BPM

## 2023-08-22 PROCEDURE — 93000 ELECTROCARDIOGRAM COMPLETE: CPT

## 2023-08-22 PROCEDURE — 99214 OFFICE O/P EST MOD 30 MIN: CPT

## 2023-08-22 RX ORDER — DILTIAZEM HYDROCHLORIDE 120 MG/1
120 CAPSULE, EXTENDED RELEASE ORAL
Qty: 90 | Refills: 3 | Status: DISCONTINUED | COMMUNITY
Start: 2020-03-18 | End: 2023-08-22

## 2023-08-22 NOTE — PHYSICAL EXAM
[Well Developed] : well developed [No Acute Distress] : no acute distress [No Carotid Bruit] : no carotid bruit [No Murmur] : no murmur [Tachycardia] : tachycardic [Rhythm Regular] : regular [Clear Lung Fields] : clear lung fields [No Respiratory Distress] : no respiratory distress  [Normal Gait] : normal gait [No Edema] : no edema [Moves all extremities] : moves all extremities [Normal Speech] : normal speech [Alert and Oriented] : alert and oriented [Normal memory] : normal memory

## 2023-08-26 NOTE — ADDENDUM
[FreeTextEntry1] : Addendum 8/23/2023: Patient returned to office for EKG- EKG with Alutter 66bpm. Pt reports feeling well. He will remain on Diltiazem 240mg daily. F/u as planned.  Delaney CANAS

## 2023-08-26 NOTE — HISTORY OF PRESENT ILLNESS
[FreeTextEntry1] : 62-year-old male who is here for follow-up due to asymptomatic atrial flutter, prior history of DVT, dilated aortic root.  He Is a status post RF ablation for atrial flutter on 6/27/2019 and pulmonary vein isolation and CTI.  Recurrent failed DC cardioversion despite the use of ibutilide.  Patient is currently on rate control and anticoagulated and denies any significant symptoms.  He denies any chest pain or shortness of breath.  He denies any bleeding.  Compliant with all his medications  2/6/23: feels well and has no complaints. he is to go for lithotripsy surgery soon. Denies chest pains or shortness of breath at rest or with exertion. Able to ascend multiple flights of stairs without issue. Remains in Aflutter, rate controlled. No palpitations.   ECG today A flutter   Echo 9/2022: Normal LVEF without wall motion abnormalities. Aortic root 4.5 cm, increased from 4.1  8/22/2023: Of note, he was recently here for follow up on 8/3/23 with Dr. Webber, note not yet finalized in chart.  Patient presents to the office for evaluation of epistaxis on anticoagulation. He had a severe nosebleed requiring cauterization by ENT recently, a few days later he blew his nose and it happened again requiring another cauterization. He called our office and held Eliquis for 3 days to allow the nose to heal. He restarted Eliquis this morning. Denies chest pain, SOB at rest, SHIRLEY, palpitations, lightheadedness, dizziness, fatigue, syncope, near syncope and LE edema. Denies smoking, excessive alcohol and illicit drug use.

## 2023-08-26 NOTE — END OF VISIT
[Time Spent: ___ minutes] : I have spent [unfilled] minutes of time on the encounter. [FreeTextEntry3] : I have personally seen, examined, and participated in the care of this patient. I have reviewed all pertinent clinical information, including history, physical exam, plan, and the PA/NP's note and agree except as noted below.  Mr. De La Cruz has been in persistent atrial flutter since 2019 and has been lost to follow-up from an EP perspective.  He has generally been well rate controlled however recently developed rapid ventricular response.  He is planning to go on a cruise later this week. We will increase his diltiazem to 240 mg daily.  I will have him return for an EKG tomorrow to ensure that he has adequate heart rate control.  He will also remain on metoprolol succinate 200 mg daily.  Ultimately however I believe he would do best with repeat EP study and catheter ablation to restore normal sinus rhythm.  He is very young to have longstanding persistent atrial flutter and I believe he would have better long-term outcomes if he remains in sinus rhythm.  When he returns from vacation we will discuss this further.  He will remain on apixaban.  Cecilio Pat MD, FACC, [facility] Clinical Cardiac Electrophysiology

## 2023-08-26 NOTE — DISCUSSION/SUMMARY
[EKG obtained to assist in diagnosis and management of assessed problem(s)] : EKG obtained to assist in diagnosis and management of assessed problem(s) [FreeTextEntry1] : Patient presents to the office for evaluation of epistaxis on anticoagulation. Discussed with Dr. Pat.  Assessment/ Plan: We discussed benefits/ risks of anticoagulation in the setting of persistent Aflutter. Patient has only recenlty had episodes of nosebleeds and is willing to continue anticoagulation with Eliquis 5mg BID at this time. Advised to notify the office if he has recurrent episodes of bleeding of any sort, including severe nosebleeds. EKG today reveals Aflutter RVR. Patient is asymptomatic. Increase Diltiazem to 240mg daily. Advised to take an additional dose of 120mg tablet upon returning home this morning and start 240mg pill tomorrow. He will return to the office tomorrow for EKG to assess HR control. Continue Metoprolol Succinate 200mg daily. He will be going away on vacation later this week. Follow up with Dr. Pat after vacation for discussion of possible ablation. Patient was advised to contact the office or seek emergency medical care for any new, worsening or concerning symptoms. Patient verbalized understanding and is in agreement with the above plan.  Delaney CANAS

## 2023-08-26 NOTE — REVIEW OF SYSTEMS
[Feeling Fatigued] : not feeling fatigued [SOB] : no shortness of breath [Dyspnea on exertion] : not dyspnea during exertion [Chest Discomfort] : no chest discomfort [Lower Ext Edema] : no extremity edema [Palpitations] : no palpitations [Orthopnea] : no orthopnea [Syncope] : no syncope [Dizziness] : no dizziness [FreeTextEntry4] : nosebleeds

## 2023-08-30 NOTE — ED ADULT NURSE NOTE - NSSISCREENINGQ2_ED_A_ED
exceptions. Attendance may be in person or virtually. The support group is located in person at [facility], [address], [place], [address], [facility] at [phone] to speak with a nurse, or go to your nearest emergency room. If you need to schedule, reschedule or cancel an appointment, please call the [address] [place] [address] at [phone]. 
No

## 2023-09-13 NOTE — H&P ADULT - ASSESSMENT
Patient is a 58-year-old male with PMHx of GERD, DVT (after prolonged flight, s/p 3 months of Eliquis now discontinued) with new onset Atrial Flutter, currently rate controlled, hemodynamically stable.    Admit to Medicine resident service under Dr. Abraham Golden/Dr. Selina Sahu  Telemetry bed  Bedrest with bathroom privileges.    Atrial flutter  S/p x1 dose of 25mg diltiazem IVP and started on PO diltiazem, now rate controlled. Will continue CCB and PRN BB for RVR as per Cardio.  Negative troponin x1, will f/u serial cardiac enzymes.  f/u TTE, NPO after midnight for possible MARINA and cardioversion.  Patient on AC with full dose Lovenox.  Serial EKG.  Normal TSH.    GERD  Continue PPI    Prophylactic measure  Patient on full dose AC.  VCD boots.
30
no

## 2023-09-18 ENCOUNTER — APPOINTMENT (OUTPATIENT)
Dept: SURGERY | Facility: CLINIC | Age: 63
End: 2023-09-18
Payer: COMMERCIAL

## 2023-09-18 VITALS
HEART RATE: 58 BPM | WEIGHT: 223 LBS | HEIGHT: 74 IN | DIASTOLIC BLOOD PRESSURE: 78 MMHG | BODY MASS INDEX: 28.62 KG/M2 | RESPIRATION RATE: 16 BRPM | OXYGEN SATURATION: 95 % | SYSTOLIC BLOOD PRESSURE: 119 MMHG | TEMPERATURE: 97.7 F

## 2023-09-18 DIAGNOSIS — R10.33 PERIUMBILICAL PAIN: ICD-10-CM

## 2023-09-18 PROCEDURE — 99213 OFFICE O/P EST LOW 20 MIN: CPT

## 2023-09-22 NOTE — ED ADULT NURSE NOTE - NSSISCREENINGQ2_ED_A_ED
Reviewed blood pressures, improved since starting hydrochlorothiazide. She notes her dizziness has pretty much resolved at this time as well. She will continue to keep monitoring her blood pressure. No further concerns at this time.    No

## 2023-10-10 ENCOUNTER — APPOINTMENT (OUTPATIENT)
Dept: ELECTROPHYSIOLOGY | Facility: CLINIC | Age: 63
End: 2023-10-10

## 2023-10-18 PROBLEM — I48.91 ATRIAL FIBRILLATION WITH RVR: Status: ACTIVE | Noted: 2019-04-11

## 2023-10-18 PROBLEM — R06.09 DOE (DYSPNEA ON EXERTION): Status: ACTIVE | Noted: 2020-09-08

## 2023-11-06 ENCOUNTER — APPOINTMENT (OUTPATIENT)
Dept: CARDIOLOGY | Facility: CLINIC | Age: 63
End: 2023-11-06
Payer: COMMERCIAL

## 2023-11-06 ENCOUNTER — NON-APPOINTMENT (OUTPATIENT)
Age: 63
End: 2023-11-06

## 2023-11-06 VITALS
HEIGHT: 74 IN | HEART RATE: 68 BPM | SYSTOLIC BLOOD PRESSURE: 106 MMHG | WEIGHT: 231 LBS | OXYGEN SATURATION: 99 % | BODY MASS INDEX: 29.65 KG/M2 | DIASTOLIC BLOOD PRESSURE: 80 MMHG

## 2023-11-06 DIAGNOSIS — Z01.810 ENCOUNTER FOR PREPROCEDURAL CARDIOVASCULAR EXAMINATION: ICD-10-CM

## 2023-11-06 DIAGNOSIS — I48.92 UNSPECIFIED ATRIAL FLUTTER: ICD-10-CM

## 2023-11-06 DIAGNOSIS — I71.9 AORTIC ANEURYSM OF UNSPECIFIED SITE, W/OUT RUPTURE: ICD-10-CM

## 2023-11-06 PROCEDURE — 93000 ELECTROCARDIOGRAM COMPLETE: CPT

## 2023-11-06 PROCEDURE — 99214 OFFICE O/P EST MOD 30 MIN: CPT

## 2023-11-06 RX ORDER — DILTIAZEM HYDROCHLORIDE 240 MG/1
240 CAPSULE, EXTENDED RELEASE ORAL
Qty: 90 | Refills: 0 | Status: DISCONTINUED | COMMUNITY
Start: 2023-08-22 | End: 2023-11-06

## 2023-11-06 RX ORDER — DILTIAZEM HYDROCHLORIDE 240 MG/1
240 CAPSULE, COATED, EXTENDED RELEASE ORAL DAILY
Refills: 0 | Status: ACTIVE | COMMUNITY

## 2023-11-15 ENCOUNTER — RX RENEWAL (OUTPATIENT)
Age: 63
End: 2023-11-15

## 2023-12-07 ENCOUNTER — EMERGENCY (EMERGENCY)
Facility: HOSPITAL | Age: 63
LOS: 1 days | Discharge: HOME | End: 2023-12-07
Attending: EMERGENCY MEDICINE
Payer: COMMERCIAL

## 2023-12-07 VITALS
TEMPERATURE: 97 F | WEIGHT: 235.89 LBS | HEART RATE: 51 BPM | RESPIRATION RATE: 202 BRPM | SYSTOLIC BLOOD PRESSURE: 118 MMHG | HEIGHT: 74 IN | OXYGEN SATURATION: 96 % | DIASTOLIC BLOOD PRESSURE: 66 MMHG

## 2023-12-07 DIAGNOSIS — Z98.890 OTHER SPECIFIED POSTPROCEDURAL STATES: Chronic | ICD-10-CM

## 2023-12-07 DIAGNOSIS — Z90.49 ACQUIRED ABSENCE OF OTHER SPECIFIED PARTS OF DIGESTIVE TRACT: Chronic | ICD-10-CM

## 2023-12-07 PROCEDURE — 99283 EMERGENCY DEPT VISIT LOW MDM: CPT

## 2023-12-07 PROCEDURE — 99284 EMERGENCY DEPT VISIT MOD MDM: CPT

## 2023-12-07 RX ORDER — METRONIDAZOLE 500 MG
500 TABLET ORAL ONCE
Refills: 0 | Status: COMPLETED | OUTPATIENT
Start: 2023-12-07 | End: 2023-12-07

## 2023-12-07 RX ORDER — METRONIDAZOLE 500 MG
1 TABLET ORAL
Qty: 15 | Refills: 0
Start: 2023-12-07 | End: 2023-12-11

## 2023-12-07 RX ORDER — CIPROFLOXACIN LACTATE 400MG/40ML
500 VIAL (ML) INTRAVENOUS ONCE
Refills: 0 | Status: COMPLETED | OUTPATIENT
Start: 2023-12-07 | End: 2023-12-07

## 2023-12-07 RX ORDER — CIPROFLOXACIN LACTATE 400MG/40ML
1 VIAL (ML) INTRAVENOUS
Qty: 10 | Refills: 0
Start: 2023-12-07 | End: 2023-12-11

## 2023-12-07 RX ADMIN — Medication 500 MILLIGRAM(S): at 13:35

## 2023-12-07 NOTE — ED ADULT TRIAGE NOTE - CHIEF COMPLAINT QUOTE
Pt c/o bilat lower abdominal pain.  Reports hx of diverticulitis.  States symptoms are similar.  Denies nausea, vomiting, diarrhea.

## 2023-12-07 NOTE — ED PROVIDER NOTE - CLINICAL SUMMARY MEDICAL DECISION MAKING FREE TEXT BOX
62-year-old male patient presents to the ED complaining of lower abdominal pain.  Abdomen is soft, nontender, no signs of peritonitis.  Patient's vital signs stable.  Eating and drinking okay, without any nausea or vomiting.  No fevers at home.  Episode feels similar to his previous episode of diverticulitis.  Given patient's physical exam and vital signs, similar history of diverticulitis in the past, patient safe for discharge home on p.o. antibiotics, follow-up with his PCP.  Patient given instructions on diet for diverticulitis, including with good liquid  and fibers low diet and fluid low in fiber.  Patient instructed to take Cipro and Flagyl for the next 5 days.  Patient to return to the ED for any worsening abdominal pain, fevers, nausea, vomiting, diarrhea with blood, or any other acute symptoms  or concerns

## 2023-12-07 NOTE — ED PROVIDER NOTE - PATIENT PORTAL LINK FT
You can access the FollowMyHealth Patient Portal offered by [facility] by registering at the following website: [email or web address]. By joining [employer]’s FollowMyHealth portal, you will also be able to view your health information using other applications (apps) compatible with our system. You can access the FollowMyHealth Patient Portal offered by [facility] by registering at the following website: [email or web address]. By joining [employer]’s FollowMyHealth portal, you will also be able to view your health information using other applications (apps) compatible with our system.

## 2023-12-07 NOTE — ED PROVIDER NOTE - OBJECTIVE STATEMENT
A 62-year-old male patient with a history of diverticulitis 1 year ago presents to the ED complaining of lower abdominal pain/cramping.  Patient states that this morning he woke up with bilateral lower abdominal cramping and pain, similar to his episode of diverticulitis 1 year ago.  He denies any recent fever or chills, diarrhea or constipation, blood in his stool, urinary symptoms, nausea, or vomiting.  Patient has been eating and drinking well over the past few days.  He states that he ate a hamburger last night which has seeds in it, stating that this may have exacerbated his lower abdominal cramping.  No recent antibiotic use.  Patient does have a history of appendectomy. No further complaints at this time.

## 2023-12-07 NOTE — ED PROVIDER NOTE - PHYSICAL EXAMINATION
Const: Pt is well appearing. Awake, alert and oriented to person, place, & time. In no acute distress.   HEENT: NC/AT. Moist mucous membranes.  Eyes: PERRLA. No scleral icterus. EOMI.  Neck:. Soft and supple. Full ROM without pain. No cervical midline tenderness.   Cardiac: Regular rate and regular rhythm. +S1/S2. Peripheral pulses 2+ and symmetric. No LE edema.  Resp: Speaking in full sentences, breath sounds equal and clear bilaterally. No wheezes, rales or rhonchi.  Abd: Soft, non-tender, non-distended. Normal bowel sounds in all 4 quadrants. No guarding or rebound.  MSK: Spine midline and non-tender. No CVAT.  Skin: No rashes, abrasions or lacerations.  Neuro: Moves all extremities symmetrically. No motor or sensory deficits

## 2023-12-07 NOTE — ED PROVIDER NOTE - NSFOLLOWUPINSTRUCTIONS_ED_ALL_ED_FT
Diverticulitis    Diverticulitis happens when poop (stool) and bacteria get trapped in small pouches in the colon called diverticula. These pouches may form if you have a condition called diverticulosis. When the poop and bacteria get trapped, it can cause an infection and inflammation.    Diverticulitis may cause severe stomach pain and diarrhea. It can also lead to tissue damage in your colon. This can cause bleeding or blockage. In some cases, the diverticula may burst (rupture). This can cause infected poop to go into other parts of your abdomen.    What are the causes?  This condition is caused by poop getting trapped in the diverticula. This allows bacteria to grow. It can lead to inflammation and infection.    What increases the risk?  You are more likely to get this condition if you have diverticulosis. You are also more at risk if:  You are overweight or obese.  You do not get enough exercise.  You drink alcohol.  You smoke.  You eat a lot of red meat, such as beef, pork, or lamb.  You do not get enough fiber. Foods high in fiber include fruits, vegetables, beans, nuts, and whole grains.  You are over 40 years of age.  What are the signs or symptoms?  Symptoms of this condition may include:  Pain and tenderness in the abdomen. This pain is often felt on the left side but may occur in other spots.  Fever and chills.  Nausea and vomiting.  Cramping.  Bloating.  Changes in how often you poop.  Blood in your poop.  How is this diagnosed?  This condition is diagnosed based on your medical history and a physical exam. You may also have tests done to make sure there is nothing else causing your condition. These tests may include:  Blood tests.  Tests done on your pee (urine).  A CT scan of the abdomen.  You may need to have a colonoscopy. This is an exam to look at your whole large intestine. During the exam, a tube is put into the opening of your butt (anus) and then moved into your rectum, colon, and other parts of the large intestine.    This exam is done to look at the diverticula. It can also see if there is something else that may be causing your symptoms.    How is this treated?  Most cases are mild and can be treated at home. You may be told to:  Take over-the-counter pain medicine.  Only eat and drink clear liquids.  Take antibiotics.  Rest.  More severe cases may need to be treated at a hospital. Treatment may include:  Not eating or drinking.  Taking pain medicines.  Getting antibiotics through an IV.  Getting fluids and nutrition through an IV.  Surgery.  Follow these instructions at home:  Medicines    Take over-the-counter and prescription medicines only as told by your health care provider. These include fiber supplements, probiotics, and medicines to soften your poop (stool softeners).  If you were prescribed antibiotics, take them as told by your provider. Do not stop using the antibiotic even if you start to feel better.  Ask your provider if the medicine prescribed to you requires you to avoid driving or using machinery.  Eating and drinking    Pear, berries, artichoke, and beans.  Follow the diet told by your provider. You may need to only eat and drink liquids.  After your symptoms get better, you may be able to return to a more normal diet. You may be told to eat at least 25 grams (25 g) of fiber each day. Fiber makes it easier to poop. Healthy sources of fiber include:  Berries. One cup has 4–8 g of fiber.  Beans or lentils. One-half cup has 5–8 g of fiber.  Green vegetables. One cup has 4 g of fiber.  Avoid eating red meat.  General instructions    Do not use any products that contain nicotine or tobacco. These products include cigarettes, chewing tobacco, and vaping devices, such as e-cigarettes. If you need help quitting, ask your provider.  Exercise for at least 30 minutes, 3 times a week. Exercise hard enough to raise your heart rate and break a sweat.  Contact a health care provider if:  Your pain gets worse.  Your pooping does not go back to normal.  Your symptoms do not get better with treatment.  Your symptoms get worse all of a sudden.  You have a fever.  You vomit more than one time.  Your poop is bloody, black, or tarry.  This information is not intended to replace advice given to you by your health care provider. Make sure you discuss any questions you have with your health care provider.

## 2023-12-07 NOTE — ED PROVIDER NOTE - ATTENDING CONTRIBUTION TO CARE
72.5 I performed the initial face to face bedside interview with this patient regarding history of present illness, review of symptoms and relevant past medical, social and family history.  I completed an independent physical examination.  I was the initial provider who evaluated this patient. I have signed out the follow up of any pending tests (i.e. labs, radiological studies) to the resident.  I have communicated the patient’s plan of care and disposition with the resident.

## 2023-12-07 NOTE — ED PROVIDER NOTE - NS ED ROS FT
Review of Systems:  	•	CONSTITUTIONAL - no fever or chills. No weight loss  	•	EYES - no eye pain, no blurred vision  	•	ENT - no sore throat, no rhinorrhea   	•	RESPIRATORY - no shortness of breath, no cough  	•	CARDIAC - no chest pain, no palpitations  	•	GI - +ABD PAIN  	•	GENITO-URINARY - No dysuria, frequency, or hematuria  	•	MUSCULOSKELETAL - no joint pain, no back pain, no neck pain  	•	NEUROLOGIC - no weakness, no headache, no gait abnormality, no LOC  	•	SKIN - No abrasions, no lacerations, no rashes  	•	PSYCH - no anxiety, non suicidal, non homicidal, no hallucination, no depression

## 2023-12-07 NOTE — ED PROVIDER NOTE - ADDITIONAL NOTES AND INSTRUCTIONS:
PT was evaluated At [facility] ED and was found to have a condition that warranted time of to rest and heal from work    Dr. William Wiedemann, DO PT was evaluated At [facility] ED and was found to have a condition that warranted time of to rest and heal from work    Dr. William Wiedemann, DO

## 2023-12-16 NOTE — ED PROVIDER NOTE - MDM ORDERS SUBMITTED SELECTION
Imaging Studies/Labs/EKG/Medications
Alert-The patient is alert, awake and responds to voice. The patient is oriented to time, place, and person. The triage nurse is able to obtain subjective information.

## 2023-12-18 ENCOUNTER — APPOINTMENT (OUTPATIENT)
Dept: SURGERY | Facility: CLINIC | Age: 63
End: 2023-12-18

## 2024-01-25 ENCOUNTER — RX RENEWAL (OUTPATIENT)
Age: 64
End: 2024-01-25

## 2024-02-14 ENCOUNTER — APPOINTMENT (OUTPATIENT)
Dept: SURGERY | Facility: CLINIC | Age: 64
End: 2024-02-14
Payer: COMMERCIAL

## 2024-02-14 VITALS
DIASTOLIC BLOOD PRESSURE: 69 MMHG | SYSTOLIC BLOOD PRESSURE: 104 MMHG | TEMPERATURE: 97.5 F | BODY MASS INDEX: 29.52 KG/M2 | HEART RATE: 91 BPM | HEIGHT: 74 IN | RESPIRATION RATE: 16 BRPM | OXYGEN SATURATION: 97 % | WEIGHT: 230 LBS

## 2024-02-14 DIAGNOSIS — K42.9 UMBILICAL HERNIA W/OUT OBSTRUCTION OR GANGRENE: ICD-10-CM

## 2024-02-14 PROCEDURE — 99213 OFFICE O/P EST LOW 20 MIN: CPT

## 2024-02-14 NOTE — HISTORY OF PRESENT ILLNESS
[de-identified] : The patient returns to the office with no new complaints. He has no abdominal pain, nausea, or vomit.  The patient has been unable to lose any weight since his last visit.

## 2024-02-14 NOTE — PHYSICAL EXAM
[JVD] : no jugular venous distention  [Abdominal Masses] : No abdominal masses [Abdomen Tenderness] : ~T ~M No abdominal tenderness [No Rash or Lesion] : No rash or lesion [Purpura] : no purpura  [Petechiae] : no petechiae [Skin Ulcer] : no ulcer [Skin Induration] : no induration [Alert] : alert [Oriented to Person] : oriented to person [Oriented to Place] : oriented to place [Oriented to Time] : oriented to time [Calm] : calm [de-identified] : non toxic, in no acute distress [de-identified] : NC/AT PERRL EOMI no scleral icterus [de-identified] : trachea midline with no gross mass  [de-identified] : no audible wheezing or stridor [de-identified] : the abdomen remains protuberant, soft, no localizing tenderness, no rebound, no masses  [de-identified] : FROM of all extremities with no gross deformity or angulation, there remains a stable reducible umbilical hernia, there remains no signs of ventral hernia, there is no evidence of recurrent inguinal hernias [de-identified] : a lower midline scar is noted and inguinal incisions consistent with the patient's past surgical history [de-identified] : mood is calm

## 2024-02-14 NOTE — ASSESSMENT
[FreeTextEntry1] : The patient is a 63-year-old male with a stable umbilical hernia.  He has been advised he will benefit from weight loss. The patient has been advised that weight loss will be an important adjunct to help potentially reduce the risks of infection, hernia recurrence, and chronic post operative pain associated with surgery by potentially reducing the tension along the abdominal wall.  He will follow up in 6 months or sooner should any problems arise. A total of 20 minutes was spent coordinating the patient's care.

## 2024-02-22 NOTE — DISCHARGE NOTE ADULT - NS DC INTERPRETER YES NO
Patient seeks psychiatric stabilization to return to the community with appropriate aftercare and supports.
No

## 2024-05-31 RX ORDER — METOPROLOL SUCCINATE 200 MG/1
200 TABLET, EXTENDED RELEASE ORAL
Qty: 180 | Refills: 0 | Status: ACTIVE | COMMUNITY
Start: 2019-11-07 | End: 1900-01-01

## 2024-05-31 RX ORDER — DILTIAZEM HYDROCHLORIDE 240 MG/1
240 CAPSULE, EXTENDED RELEASE ORAL
Qty: 90 | Refills: 0 | Status: ACTIVE | COMMUNITY
Start: 2023-08-22 | End: 1900-01-01

## 2024-05-31 RX ORDER — APIXABAN 5 MG/1
5 TABLET, FILM COATED ORAL
Qty: 180 | Refills: 0 | Status: ACTIVE | COMMUNITY
Start: 2018-09-19 | End: 1900-01-01

## 2024-06-12 ENCOUNTER — RX RENEWAL (OUTPATIENT)
Age: 64
End: 2024-06-12

## 2024-06-12 RX ORDER — ESOMEPRAZOLE MAGNESIUM 20 MG/1
20 CAPSULE, DELAYED RELEASE ORAL DAILY
Qty: 90 | Refills: 2 | Status: ACTIVE | COMMUNITY
Start: 2024-06-12 | End: 1900-01-01

## 2024-06-21 ENCOUNTER — OFFICE (OUTPATIENT)
Dept: URBAN - METROPOLITAN AREA CLINIC 100 | Facility: CLINIC | Age: 64
Setting detail: OPHTHALMOLOGY
End: 2024-06-21
Payer: COMMERCIAL

## 2024-06-21 ENCOUNTER — RX ONLY (RX ONLY)
Age: 64
End: 2024-06-21

## 2024-06-21 DIAGNOSIS — H01.001: ICD-10-CM

## 2024-06-21 DIAGNOSIS — H25.13: ICD-10-CM

## 2024-06-21 DIAGNOSIS — H00.11: ICD-10-CM

## 2024-06-21 DIAGNOSIS — H01.004: ICD-10-CM

## 2024-06-21 PROCEDURE — 99203 OFFICE O/P NEW LOW 30 MIN: CPT | Performed by: OPHTHALMOLOGY

## 2024-06-21 ASSESSMENT — CONFRONTATIONAL VISUAL FIELD TEST (CVF)
OS_FINDINGS: FULL
OD_FINDINGS: FULL

## 2024-06-21 ASSESSMENT — LID EXAM ASSESSMENTS
OS_BLEPHARITIS: LUL 1+
OD_BLEPHARITIS: RUL 1+

## 2024-06-26 ENCOUNTER — OFFICE (OUTPATIENT)
Dept: URBAN - METROPOLITAN AREA CLINIC 6 | Facility: CLINIC | Age: 64
Setting detail: OPHTHALMOLOGY
End: 2024-06-26
Payer: COMMERCIAL

## 2024-06-26 ENCOUNTER — RX ONLY (RX ONLY)
Age: 64
End: 2024-06-26

## 2024-06-26 DIAGNOSIS — H00.021: ICD-10-CM

## 2024-06-26 PROCEDURE — 99213 OFFICE O/P EST LOW 20 MIN: CPT

## 2024-06-26 ASSESSMENT — LID EXAM ASSESSMENTS
OS_BLEPHARITIS: LUL 1+
OD_BLEPHARITIS: RUL 1+

## 2024-06-26 ASSESSMENT — CONFRONTATIONAL VISUAL FIELD TEST (CVF)
OD_FINDINGS: FULL
OS_FINDINGS: FULL

## 2024-07-01 ENCOUNTER — RX ONLY (RX ONLY)
Age: 64
End: 2024-07-01

## 2024-07-01 ENCOUNTER — OFFICE (OUTPATIENT)
Dept: URBAN - METROPOLITAN AREA CLINIC 6 | Facility: CLINIC | Age: 64
Setting detail: OPHTHALMOLOGY
End: 2024-07-01
Payer: COMMERCIAL

## 2024-07-01 DIAGNOSIS — H00.021: ICD-10-CM

## 2024-07-01 PROBLEM — H01.004 BLEPHARITIS; RIGHT UPPER LID, LEFT UPPER LID: Status: ACTIVE | Noted: 2024-06-21

## 2024-07-01 PROBLEM — H01.001 BLEPHARITIS; RIGHT UPPER LID, LEFT UPPER LID: Status: ACTIVE | Noted: 2024-06-21

## 2024-07-01 PROCEDURE — 99213 OFFICE O/P EST LOW 20 MIN: CPT

## 2024-07-01 ASSESSMENT — REFRACTION_CURRENTRX
OS_AXIS: 050
OD_AXIS: 180
OD_SPHERE: -2.00
OS_OVR_VA: 20/
OD_CYLINDER: 0.00
OD_OVR_VA: 20/
OS_CYLINDER: -0.50
OS_SPHERE: -1.75

## 2024-07-01 ASSESSMENT — KERATOMETRY
OS_AXISANGLE_DEGREES: 70
OS_K1POWER_DIOPTERS: 46.25
OD_K1POWER_DIOPTERS: ERR
OS_K2POWER_DIOPTERS: 46.75

## 2024-07-01 ASSESSMENT — CONFRONTATIONAL VISUAL FIELD TEST (CVF)
OS_FINDINGS: FULL
OD_FINDINGS: FULL

## 2024-07-01 ASSESSMENT — LID EXAM ASSESSMENTS
OS_BLEPHARITIS: LUL 1+
OD_BLEPHARITIS: RUL 1+

## 2024-07-23 ENCOUNTER — OFFICE (OUTPATIENT)
Dept: URBAN - METROPOLITAN AREA CLINIC 6 | Facility: CLINIC | Age: 64
Setting detail: OPHTHALMOLOGY
End: 2024-07-23
Payer: COMMERCIAL

## 2024-07-23 DIAGNOSIS — H00.11: ICD-10-CM

## 2024-07-23 DIAGNOSIS — H43.393: ICD-10-CM

## 2024-07-23 DIAGNOSIS — H25.13: ICD-10-CM

## 2024-07-23 DIAGNOSIS — H52.4: ICD-10-CM

## 2024-07-23 DIAGNOSIS — H01.001: ICD-10-CM

## 2024-07-23 DIAGNOSIS — H01.004: ICD-10-CM

## 2024-07-23 DIAGNOSIS — H43.813: ICD-10-CM

## 2024-07-23 PROCEDURE — 92015 DETERMINE REFRACTIVE STATE: CPT | Performed by: OPHTHALMOLOGY

## 2024-07-23 PROCEDURE — 92014 COMPRE OPH EXAM EST PT 1/>: CPT | Performed by: OPHTHALMOLOGY

## 2024-07-23 ASSESSMENT — LID EXAM ASSESSMENTS
OD_BLEPHARITIS: RUL 1+
OS_BLEPHARITIS: LUL 1+

## 2024-08-05 ENCOUNTER — APPOINTMENT (OUTPATIENT)
Dept: SURGERY | Facility: CLINIC | Age: 64
End: 2024-08-05

## 2024-08-05 PROCEDURE — 99213 OFFICE O/P EST LOW 20 MIN: CPT

## 2024-08-05 NOTE — PHYSICAL EXAM
[JVD] : no jugular venous distention  [Abdominal Masses] : No abdominal masses [Abdomen Tenderness] : ~T ~M No abdominal tenderness [No Rash or Lesion] : No rash or lesion [Purpura] : no purpura  [Petechiae] : no petechiae [Skin Ulcer] : no ulcer [Skin Induration] : no induration [Alert] : alert [Oriented to Person] : oriented to person [Oriented to Place] : oriented to place [Oriented to Time] : oriented to time [Calm] : calm [de-identified] : non toxic, in no acute distress [de-identified] : NC/AT PERRL EOMI no scleral icterus [de-identified] : trachea midline with no gross mass  [de-identified] : no audible wheezing or stridor [de-identified] : the abdomen remains protuberant, soft, no localizing tenderness, no rebound, no masses  [de-identified] : FROM of all extremities with no gross deformity or angulation, there remains a stable reducible umbilical hernia, there remains no signs of ventral hernia, there is no evidence of recurrent inguinal hernias [de-identified] : a lower midline scar is noted and inguinal incisions consistent with the patient's past surgical history [de-identified] : mood is calm

## 2024-08-05 NOTE — HISTORY OF PRESENT ILLNESS
[de-identified] : The patient comes to the office today with no abdominal pain, nausea, or vomit. The hernia is mildly uncomfortable at times. He states he feels as though it has gotten larger.

## 2024-08-05 NOTE — ASSESSMENT
[FreeTextEntry1] : The patient is a 63-year-old male with a stable umbilical hernia. He has been encouraged to lose weight prior to repair of the hernia. We set a goal of 20 pounds. He will follow up in 3 months to reevaluate.  A total of 20 minutes was spent coordinating the patient's care.

## 2024-08-08 ENCOUNTER — APPOINTMENT (OUTPATIENT)
Dept: CARDIOLOGY | Facility: CLINIC | Age: 64
End: 2024-08-08

## 2024-08-08 PROBLEM — R05.9 COUGH: Status: ACTIVE | Noted: 2019-07-23

## 2024-08-08 PROCEDURE — 99214 OFFICE O/P EST MOD 30 MIN: CPT

## 2024-08-08 PROCEDURE — G2211 COMPLEX E/M VISIT ADD ON: CPT

## 2024-08-08 PROCEDURE — 93000 ELECTROCARDIOGRAM COMPLETE: CPT

## 2024-08-08 NOTE — DISCUSSION/SUMMARY
[EKG obtained to assist in diagnosis and management of assessed problem(s)] : EKG obtained to assist in diagnosis and management of assessed problem(s) [FreeTextEntry1] : #Aortic root dilation/aneurysm: 4.8 cm on recent CT-A 2/2023 - Conitnue metoprolol and diltiazem to reduce RPP - hypotension precludes ability to start ARB - Repeat CT-A to characterize and refer to Aortic [facility] with Dr. Babin  #Atrial flutter: chronic, asymptomatic. S/p failed CTI and DCCV - continue metop, dilt - conitnue eliquis - refer to EP for consideration of rhythm control  Patient was advised to partake in 150 minutes of moderate exercise per week. patient was advised to see us in 6 months if stable and certainly earlier with any new symptoms.

## 2024-08-08 NOTE — HISTORY OF PRESENT ILLNESS
[FreeTextEntry1] : 62-year-old male who is here for follow-up due to asymptomatic atrial flutter, prior history of DVT, dilated aortic root.  He Is a status post RF ablation for atrial flutter on 6/27/2019 and pulmonary vein isolation and CTI.  Recurrent failed DC cardioversion despite the use of ibutilide.  Patient is currently on rate control and anticoagulated and denies any significant symptoms.  He denies any chest pain or shortness of breath.  He denies any bleeding.  Compliant with all his medications  2/6/23: feels well and has no complaints. he is to go for lithotripsy surgery soon. Denies chest pains or shortness of breath at rest or with exertion. Able to ascend multiple flights of stairs without issue. Remains in Aflutter, rate controlled. No palpitations.   ECG today A flutter   Echo 9/2022: Normal LVEF without wall motion abnormalities. Aortic root 4.5 cm, increased from 4.1  8/2023: Presents today for follow up. Remains in atrial flutter, rate control. Denies chest pains or shortness of breath. He is able to exercise without issues.  Otherwise, denies orthopnea, paroxysmal nocturnal dyspnea, lower extremity edema, unexplained weight gain or dyspnea on exertion.  8/2024 Presents for fuv. Feels well and has no complaints. Still in AFl, asymptomatic from it. Taking meds as prescribed without issues. Denies cp or shortness of breath. Working at the hospital and going up/down stairs. Needs hernia surgery---told to lose 10 lbs. Needs to cut back on food.   ECG today AFl with variable rate.   Otherwise, denies orthopnea, paroxysmal nocturnal dyspnea, lower extremity edema, unexplained weight gain or dyspnea on exertion.

## 2024-08-21 ENCOUNTER — APPOINTMENT (OUTPATIENT)
Dept: CT IMAGING | Facility: CLINIC | Age: 64
End: 2024-08-21

## 2024-08-21 PROCEDURE — 71275 CT ANGIOGRAPHY CHEST: CPT | Mod: 26

## 2024-09-06 NOTE — CONSULT LETTER
[Dear  ___] : Dear  [unfilled], [Courtesy Letter:] : I had the pleasure of seeing your patient, [unfilled], in my office today. [Please see my note below.] : Please see my note below. [Referral Closing:] : Thank you very much for seeing this patient.  If you have any questions, please do not hesitate to contact me. [Sincerely,] : Sincerely, [FreeTextEntry2] : Dr. Dion Webber [FreeTextEntry3] : Gopal Babin MD Chief of Cardiovascular & Thoracic Surgery System Director of Endovascular & Cardiovascular Surgery  Cardiovascular & Thoracic Surgery [facility]  [place] [address] [place] [address] Tel   [phone] Fax  [phone]

## 2024-09-06 NOTE — DATA REVIEWED
[FreeTextEntry1] : CT scan performed on 02/13/2023 showed  FINDINGS:  THORACIC AORTA: No thoracic aortic intramural hematoma or dissection.  The aortic root at the sinuses of Valsalva measures 4.8 cm (coronal series image 39) and is unchanged compared to 6/29/2019 when remeasured. Sinotubular junction measures 3.2 cm. Mid ascending thoracic aorta measures 3.5 cm. The aortic arch is left-sided and the left vertebral artery originates directly from the arch. The aortic arch measures 3.2 cm. The mid descending thoracic aorta measures 2.6 cm.  MEDIASTINUM: The left atrium is dilated. There is suggestion of an interatrial septal aneurysm. No pericardial effusion. No large mediastinal lymph nodes.  AIRWAYS, LUNGS, PLEURA: Clear central airways. Bilateral lower lobe dependent subsegmental atelectasis versus scarring. No focal consolidation. No pleural effusion.  IMAGED ABDOMEN: Multiple hepatic hypodense lesions unchanged compared to 9/9/2022 CT abdomen.  SOFT TISSUES: Unremarkable.  BONES: Multiple thyroid nodules.   IMPRESSION:.  Dilated aortic root at the sinuses of Valsalva measuring 4.8 cm is unchanged compared to 6/29/2019.  --- End of Report ---

## 2024-09-06 NOTE — REASON FOR VISIT
[Consultation] : a consultation visit [FreeTextEntry1] : dilated aortic root at the sinuses of Valsalva measuring 4.8 cm

## 2024-09-13 ENCOUNTER — APPOINTMENT (OUTPATIENT)
Dept: CARDIOTHORACIC SURGERY | Facility: CLINIC | Age: 64
End: 2024-09-13

## 2024-09-13 DIAGNOSIS — I71.9 AORTIC ANEURYSM OF UNSPECIFIED SITE, W/OUT RUPTURE: ICD-10-CM

## 2024-09-23 ENCOUNTER — OFFICE (OUTPATIENT)
Dept: URBAN - METROPOLITAN AREA CLINIC 6 | Facility: CLINIC | Age: 64
Setting detail: OPHTHALMOLOGY
End: 2024-09-23
Payer: COMMERCIAL

## 2024-09-23 DIAGNOSIS — H01.004: ICD-10-CM

## 2024-09-23 DIAGNOSIS — H01.001: ICD-10-CM

## 2024-09-23 DIAGNOSIS — H00.021: ICD-10-CM

## 2024-09-23 PROBLEM — B88.0 ACARIASIS, INFESTATION OF MITES AND OR TICS: Status: ACTIVE | Noted: 2024-09-23

## 2024-09-23 PROCEDURE — 99213 OFFICE O/P EST LOW 20 MIN: CPT

## 2024-09-23 ASSESSMENT — LID EXAM ASSESSMENTS
OD_BLEPHARITIS: RUL 1+
OS_COMMENTS: COLLARETTES
OS_BLEPHARITIS: LUL 1+
OD_COMMENTS: COLLARETTES

## 2024-09-23 ASSESSMENT — CONFRONTATIONAL VISUAL FIELD TEST (CVF)
OD_FINDINGS: FULL
OS_FINDINGS: FULL

## 2024-09-27 ENCOUNTER — APPOINTMENT (OUTPATIENT)
Dept: CARDIOTHORACIC SURGERY | Facility: CLINIC | Age: 64
End: 2024-09-27
Payer: COMMERCIAL

## 2024-09-27 VITALS
BODY MASS INDEX: 30.54 KG/M2 | HEART RATE: 52 BPM | OXYGEN SATURATION: 97 % | SYSTOLIC BLOOD PRESSURE: 135 MMHG | HEIGHT: 74 IN | WEIGHT: 238 LBS | RESPIRATION RATE: 16 BRPM | DIASTOLIC BLOOD PRESSURE: 83 MMHG

## 2024-09-27 PROCEDURE — 99204 OFFICE O/P NEW MOD 45 MIN: CPT

## 2024-09-27 NOTE — CONSULT LETTER
[FreeTextEntry2] : Dr. Dion Webber [FreeTextEntry3] : Gopal Babin MD Chief of Cardiovascular & Thoracic Surgery System Director of Endovascular & Cardiovascular Surgery  Cardiovascular & Thoracic Surgery [facility]  [place] [address] [place] [address] Tel   [phone] Fax  [phone]

## 2024-09-27 NOTE — REVIEW OF SYSTEMS
[Negative] : Genitourinary [Feeling Poorly] : not feeling poorly [Feeling Tired] : not feeling tired [Chest Pain] : no chest pain [Palpitations] : no palpitations [Shortness Of Breath] : no shortness of breath [SOB on Exertion] : no shortness of breath during exertion

## 2024-09-27 NOTE — HISTORY OF PRESENT ILLNESS
[FreeTextEntry1] : Mr. Garcia is a 63-year-old male referred by Dr. Dion Webber for an initial evaluation of his dilated aortic root, which measures 4.8 cm as of August 8, 2023. This measurement remains unchanged from June 29, 2019. His past medical history includes hypertension, atrial fibrillation, atrial flutter (for which he underwent radiofrequency ablation on June 27, 2019), deep vein thrombosis, dilated aortic root, and gastroesophageal reflux disease (GERD).  He denies any chest pain, fatigue, dizziness, palpitations, shortness of breath, syncopal episode or lower extremity edema. He has no family history of aortic disease, bicuspid aortic valve or sudden death.

## 2024-09-27 NOTE — CONSULT LETTER
[FreeTextEntry2] : Dr. Dion Webber [FreeTextEntry3] : Gopal Babin MD Chief of Cardiovascular & Thoracic Surgery System Director of Endovascular & Cardiovascular Surgery  Cardiovascular & Thoracic Surgery [facility]  [place] [address] [place] [address] Tel   [phone] Fax  [phone]

## 2024-09-27 NOTE — PHYSICAL EXAM
[General Appearance - Well Nourished] : well nourished [General Appearance - Well Developed] : well developed [Sclera] : the sclera and conjunctiva were normal [Outer Ear] : the ears and nose were normal in appearance [Neck Appearance] : the appearance of the neck was normal [Respiration, Rhythm And Depth] : normal respiratory rhythm and effort [Auscultation Breath Sounds / Voice Sounds] : lungs were clear to auscultation bilaterally [Heart Rate And Rhythm] : heart rate was normal and rhythm regular [Examination Of The Chest] : the chest was normal in appearance [Abnormal Walk] : normal gait [Skin Color & Pigmentation] : normal skin color and pigmentation [Sensation] : the sensory exam was normal to light touch and pinprick [Oriented To Time, Place, And Person] : oriented to person, place, and time [Impaired Insight] : insight and judgment were intact [FreeTextEntry1] : [facility] I  No murmur

## 2024-09-27 NOTE — ASSESSMENT
[FreeTextEntry1] : Mr. Garcia is a 63-year-old male referred by Dr. Dion Webber for an initial evaluation of his dilated aortic root, which measures 4.8 cm as of August 8, 2023. This measurement remains unchanged from June 29, 2019. His past medical history includes hypertension, atrial fibrillation, atrial flutter (for which he underwent radiofrequency ablation on June 27, 2019), deep vein thrombosis, dilated aortic root, and gastroesophageal reflux disease (GERD).  Today we discussed the disease process with regards to ascending aortic aneurysm. This is not something that we can change or reduce in size medically but something that we monitor for the long term, assessing for growth in total size and rate of change.   The current measurement of this patient's ascending aorta is 4.8 cm (questionable read), which we do not yet consider an indication for surgery. I am asking for a re-evaluation of the current scan to evaluate the aortic measurements. A phone call follow up will be held after interdisciplinary discussion with radiology regarding findings.    Protective strategies were discussed which include blood pressure management and prevention of very heavy or prolonged lifting and Vagal Maneuvers.  There is data to suggest that selecting inhibition of the angiotensin I receptor, while preserving angiotensin II receptor signaling may be protective in this patient population. This affect is at the subcellular level and independent of blood pressure affect. The addition, or change, to a low-dose ARB may therefore be advantageous.   PLAN: - Phone call discussion after discussion with Radiology - Hypertension Management - Maintain Aortic registry - Maintain routine follow up with Primary Care and Cardiology Providers     I, Dr. Babin personally performed the evaluation and management (E/M) services for this new patient. That E/M includes conducting the initial examination, assessing all conditions, and establishing the plan of care. Today, Justus Barron NP was here to observe my evaluation and management services for this patient.

## 2024-09-27 NOTE — PHYSICAL EXAM
[General Appearance - Well Nourished] : well nourished [Sclera] : the sclera and conjunctiva were normal [General Appearance - Well Developed] : well developed [Outer Ear] : the ears and nose were normal in appearance [Neck Appearance] : the appearance of the neck was normal [Respiration, Rhythm And Depth] : normal respiratory rhythm and effort [Auscultation Breath Sounds / Voice Sounds] : lungs were clear to auscultation bilaterally [Heart Rate And Rhythm] : heart rate was normal and rhythm regular [Examination Of The Chest] : the chest was normal in appearance [Abnormal Walk] : normal gait [Skin Color & Pigmentation] : normal skin color and pigmentation [Sensation] : the sensory exam was normal to light touch and pinprick [Oriented To Time, Place, And Person] : oriented to person, place, and time [Impaired Insight] : insight and judgment were intact [FreeTextEntry1] : NYHAC I  No murmur

## 2024-09-27 NOTE — DATA REVIEWED
[FreeTextEntry1] : CT scan performed through [facility] on 02/13/2023 showed  FINDINGS:  THORACIC AORTA: No thoracic aortic intramural hematoma or dissection.  The aortic root at the sinuses of Valsalva measures 4.8 cm (coronal series image 39) and is unchanged compared to 6/29/2019 when remeasured. Sinotubular junction measures 3.2 cm. Mid ascending thoracic aorta measures 3.5 cm. The aortic arch is left-sided and the left vertebral artery originates directly from the arch. The aortic arch measures 3.2 cm. The mid descending thoracic aorta measures 2.6 cm.  MEDIASTINUM: The left atrium is dilated. There is suggestion of an interatrial septal aneurysm. No pericardial effusion. No large mediastinal lymph nodes.  AIRWAYS, LUNGS, PLEURA: Clear central airways. Bilateral lower lobe dependent subsegmental atelectasis versus scarring. No focal consolidation. No pleural effusion.  IMAGED ABDOMEN: Multiple hepatic hypodense lesions unchanged compared to 9/9/2022 CT abdomen.  SOFT TISSUES: Unremarkable.  BONES: Multiple thyroid nodules.   IMPRESSION:.  Dilated aortic root at the sinuses of Valsalva measuring 4.8 cm is unchanged compared to 6/29/2019.  --- End of Report ---      Transthoracic Echocardiogram performed through [facility] on 9/16/24 Aortic Valve: The aortic valve is trileaflet. Peak transaortic gradient equals 2.1 mmHg, mean transaortic gradient equals 1.0 mmHg, the calculated aortic valve area equals 3.21 cm by the continuity equation consistent with normally opening aortic valve. No evidence of aortic valve regurgitation is seen.  Summary: 1. Endocardial visualization was enhanced with intravenous echo contrast. 2. Left ventricular ejection fraction, by visual estimation, is 50 to 55%. 3. There is mild concentric left ventricular hypertrophy. 4. LV EF by Biplane MOD= 52%. 5. The left ventricular diastolic function could not be assessed in this study. 6. Normal global left ventricular systolic function. 7. Mild mitral valve regurgitation. 8. Mild mitral annular calcification. 9. Thickening of the anterior and posterior mitral valve leaflets. 10. Severely enlarged left atrium. 11. Normal right ventricular size and function. 12. Dilatation of the aortic root at the sinuses of valsalva measuring 4.25cm. 13. There is no evidence of pericardial effusion. 14. Technically difficult study. 15. Compared to prior TTE dated 9/2/21, there are no significant changes.

## 2024-09-27 NOTE — DATA REVIEWED
[FreeTextEntry1] : CT scan performed through [facility] on 02/13/2023 showed  FINDINGS:  THORACIC AORTA: No thoracic aortic intramural hematoma or dissection.  The aortic root at the sinuses of Valsalva measures 4.8 cm (coronal series image 39) and is unchanged compared to 6/29/2019 when remeasured. Sinotubular junction measures 3.2 cm. Mid ascending thoracic aorta measures 3.5 cm. The aortic arch is left-sided and the left vertebral artery originates directly from the arch. The aortic arch measures 3.2 cm. The mid descending thoracic aorta measures 2.6 cm.  MEDIASTINUM: The left atrium is dilated. There is suggestion of an interatrial septal aneurysm. No pericardial effusion. No large mediastinal lymph nodes.  AIRWAYS, LUNGS, PLEURA: Clear central airways. Bilateral lower lobe dependent subsegmental atelectasis versus scarring. No focal consolidation. No pleural effusion.  IMAGED ABDOMEN: Multiple hepatic hypodense lesions unchanged compared to 9/9/2022 CT abdomen.  SOFT TISSUES: Unremarkable.  BONES: Multiple thyroid nodules.   IMPRESSION:.  Dilated aortic root at the sinuses of Valsalva measuring 4.8 cm is unchanged compared to 6/29/2019.  --- End of Report ---      Transthoracic Echocardiogram performed through [facility] on 9/16/24 Aortic Valve: The aortic valve is trileaflet. Peak transaortic gradient equals 2.1 mmHg, mean transaortic gradient equals 1.0 mmHg, the calculated aortic valve area equals 3.21 cm by the continuity equation consistent with normally opening aortic valve. No evidence of aortic valve regurgitation is seen.  Summary: 1. Endocardial visualization was enhanced with intravenous echo contrast. 2. Left ventricular ejection fraction, by visual estimation, is 50 to 55%. 3. There is mild concentric left ventricular hypertrophy. 4. LV EF by Biplane MOD= 52%. 5. The left ventricular diastolic function could not be assessed in this study. 6. Normal global left ventricular systolic function. 7. Mild mitral valve regurgitation. 8. Mild mitral annular calcification. 9. Thickening of the anterior and posterior mitral valve leaflets. 10. Severely enlarged left atrium. 11. Normal right ventricular size and function. 12. Dilatation of the aortic root at the sinuses of valsalva measuring 4.25cm. 13. There is no evidence of pericardial effusion. 14. Technically difficult study. 15. Compared to prior TTE dated 9/2/21, there are no significant changes.

## 2024-10-04 ENCOUNTER — APPOINTMENT (OUTPATIENT)
Dept: CARDIOTHORACIC SURGERY | Facility: CLINIC | Age: 64
End: 2024-10-04
Payer: COMMERCIAL

## 2024-10-04 DIAGNOSIS — I71.9 AORTIC ANEURYSM OF UNSPECIFIED SITE, W/OUT RUPTURE: ICD-10-CM

## 2024-10-04 PROCEDURE — 99449 NTRPROF PH1/NTRNET/EHR 31/>: CPT

## 2024-10-04 NOTE — HISTORY OF PRESENT ILLNESS
[Home] : at home, [unfilled] , at the time of the visit. [Medical Office: ([facility])___] : at the medical office located in  [FreeTextEntry1] : Mr. Garcia, a 63-year-old male, who presents for follow-up surveillance CT scan to monitor a dilated aortic root measuring 4.8 cm. The most recent CT scan of his chest, performed on 08/08/2024, confirmed a stable aortic root dilation at the sinus of Valsalva, still measuring 4.8 cm. His medical history includes hypertension, atrial fibrillation, atrial flutter (for which he underwent ablation on June 27, 2019), deep vein thrombosis, and GERD.  He reports no symptoms of chest pain, fatigue, dizziness, palpitations, shortness of breath, syncopal episodes, or lower extremity edema. Additionally, he has no family history of aortic disease, bicuspid aortic valve, or sudden death.

## 2024-10-04 NOTE — DATA REVIEWED
[FreeTextEntry1] : CT scan of the chest performed on 8/21/2024 showed ([facility])  FINDINGS: Aorta angiogram: The aortic root at the sinus of Valsalva measures 4.8 cm (coronal series 904-37) and is unchanged compared to 2/13/2023. Sinotubular junction measures 3.2 cm (904-33). Mid ascending thoracic aorta measuring 3.5 cm. The aortic arch is left-sided. Partially visualized left vertebral artery arising from aortic arch. Aortic arch measures 2.8 cm. Mid descending aorta measures 2.6 cm. Lymph nodes: No lymphadenopathy. Heart/vasculature: Stable enlargement of left atrium. No pericardial effusion. Airways/lungs/pleura: Central airways are patent. Bibasilar atelectasis. No focal consolidation. No pleural effusion.

## 2024-10-04 NOTE — ASSESSMENT
[FreeTextEntry1] : Mr. Garcia is a 63 year old male presenting for a follow-up surveillance CT scan to monitor his ascending aortic aneurysm. His most recent chest CT, performed on 08/08/2024, shows a stable dilation of the aortic root at the sinus of Valsalva, measuring 4.8 cm, consistent with prior imaging. I have personally reviewed the scan and recommend continued surveillance with a follow-up CT scan and TTE in one year. Additionally, I recommend initiating an ARB by his primary care provider or cardiologist. The patient expressed understanding of and commitment to the current plan.     Plan: Repeat surveillance CT scan in 1 year Follow-up TTE in 1 year Initiate ARB   I, Dr. Babin personally performed the evaluation and management (E/M) services for this new patient. That E/M includes conducting the initial examination, assessing all conditions, and establishing the plan of care. Today, ACP, was here to observe my evaluation and management services for this patient.

## 2024-10-04 NOTE — CONSULT LETTER
[Dear  ___] : Dear  [unfilled], [Courtesy Letter:] : I had the pleasure of seeing your patient, [unfilled], in my office today. [Please see my note below.] : Please see my note below. [Referral Closing:] : Thank you very much for seeing this patient.  If you have any questions, please do not hesitate to contact me. [Sincerely,] : Sincerely, [FreeTextEntry2] : Dr. Ashley [FreeTextEntry3] : I, Dr. Babin personally performed the evaluation and management (E/M) services for this new patient. That E/M includes conducting the initial examination, assessing all conditions, and establishing the plan of care. Today, ACP, was here to observe my evaluation and management services for this patient.

## 2024-10-14 ENCOUNTER — OFFICE (OUTPATIENT)
Dept: URBAN - METROPOLITAN AREA CLINIC 6 | Facility: CLINIC | Age: 64
Setting detail: OPHTHALMOLOGY
End: 2024-10-14
Payer: COMMERCIAL

## 2024-10-14 ENCOUNTER — RX ONLY (RX ONLY)
Age: 64
End: 2024-10-14

## 2024-10-14 DIAGNOSIS — B88.0: ICD-10-CM

## 2024-10-14 DIAGNOSIS — H00.11: ICD-10-CM

## 2024-10-14 DIAGNOSIS — H01.004: ICD-10-CM

## 2024-10-14 DIAGNOSIS — H01.001: ICD-10-CM

## 2024-10-14 PROCEDURE — 99213 OFFICE O/P EST LOW 20 MIN: CPT

## 2024-10-14 ASSESSMENT — VISUAL ACUITY
OD_BCVA: 20/20
OS_BCVA: 20/20

## 2024-10-14 ASSESSMENT — REFRACTION_MANIFEST
OD_ADD: +2.50
OS_VA1: 20/20
OS_SPHERE: -1.00
OD_VA1: 20/20-1
OS_CYLINDER: -1.00
OU_VA: 20/20-1
OU_VA: 20/20-1
OD_SPHERE: -1.00
OS_CYLINDER: -1.00
OD_AXIS: 100
OS_ADD: +2.50
OS_SPHERE: -1.00
OD_CYLINDER: -1.00
OS_VA1: 20/20
OS_AXIS: 075
OD_CYLINDER: -1.00
OS_AXIS: 075
OD_SPHERE: -1.00
OD_AXIS: 100
OD_VA1: 20/20-1

## 2024-10-14 ASSESSMENT — REFRACTION_CURRENTRX
OD_OVR_VA: 20/
OD_ADD: +2.00
OS_VPRISM_DIRECTION: PROGS
OD_CYLINDER: -0.25
OS_SPHERE: -2.00
OD_AXIS: 134
OS_ADD: +2.00
OD_SPHERE: -2.00
OS_OVR_VA: 20/
OS_CYLINDER: -0.75
OD_VPRISM_DIRECTION: PROGS
OS_AXIS: 055

## 2024-10-14 ASSESSMENT — TONOMETRY
OD_IOP_MMHG: 13
OS_IOP_MMHG: 13

## 2024-10-14 ASSESSMENT — REFRACTION_AUTOREFRACTION
OS_SPHERE: -1.00
OS_CYLINDER: -1.25
OD_SPHERE: -1.00
OS_AXIS: 065
OD_CYLINDER: -1.00
OD_AXIS: 108

## 2024-10-14 ASSESSMENT — KERATOMETRY
OS_AXISANGLE_DEGREES: 152
METHOD_AUTO_MANUAL: AUTO
OS_K2POWER_DIOPTERS: 46.50
OD_K2POWER_DIOPTERS: 46.50
OS_K1POWER_DIOPTERS: 46.00
OD_AXISANGLE_DEGREES: 040
OD_K1POWER_DIOPTERS: 46.00

## 2024-10-14 ASSESSMENT — CONFRONTATIONAL VISUAL FIELD TEST (CVF)
OS_FINDINGS: FULL
OD_FINDINGS: FULL

## 2024-10-14 ASSESSMENT — LID EXAM ASSESSMENTS
OD_COMMENTS: COLLARETTES
OD_BLEPHARITIS: RUL 1+
OS_BLEPHARITIS: LUL 1+
OS_COMMENTS: COLLARETTES

## 2024-11-04 ENCOUNTER — APPOINTMENT (OUTPATIENT)
Dept: SURGERY | Facility: CLINIC | Age: 64
End: 2024-11-04
Payer: COMMERCIAL

## 2024-11-04 VITALS
BODY MASS INDEX: 30.16 KG/M2 | HEART RATE: 85 BPM | SYSTOLIC BLOOD PRESSURE: 130 MMHG | DIASTOLIC BLOOD PRESSURE: 82 MMHG | TEMPERATURE: 97.5 F | RESPIRATION RATE: 14 BRPM | OXYGEN SATURATION: 98 % | HEIGHT: 74 IN | WEIGHT: 235 LBS

## 2024-11-04 DIAGNOSIS — K42.9 UMBILICAL HERNIA W/OUT OBSTRUCTION OR GANGRENE: ICD-10-CM

## 2024-11-04 PROCEDURE — 99213 OFFICE O/P EST LOW 20 MIN: CPT

## 2024-12-09 NOTE — ED PROVIDER NOTE - CPE EDP NEURO NORM
"Subjective   Patient ID: Waleska Sibley is a 16 y.o. male who presents for Annual Exam.    Waleska is here for his physical.    Diet:  Could be healthier  Exercise:  None  Sleep:  OK, but doesn't get enough  Stress:  Moderate (finals week)  Smoking:  Never  EtOH:  None    Profession:  Student at [employer], [employer]    Dentist:  Sees regularly  Eye doctor:  Sees regularly    Colorectal cancer screening:  Age 43  Vaccines due?  Needs flu shot    Sexually active:  No  Contraception/STI prevention:  N/A        Review of Systems   Constitutional:  Negative for fatigue and unexpected weight change.   HENT:  Negative for congestion, ear pain, postnasal drip, rhinorrhea and sore throat.    Eyes:  Negative for pain and visual disturbance.   Respiratory:  Negative for cough and shortness of breath.    Cardiovascular:  Negative for chest pain and palpitations.   Gastrointestinal:  Negative for abdominal pain, blood in stool, constipation, diarrhea, nausea and vomiting.   Genitourinary:  Negative for difficulty urinating.   Musculoskeletal:  Negative for arthralgias, back pain and myalgias.   Skin:  Negative for rash.   Neurological:  Negative for dizziness, weakness, numbness and headaches.   Psychiatric/Behavioral:  Negative for dysphoric mood. The patient is not nervous/anxious.        Objective     /67   Pulse 70   Ht 1.69 m (5' 6.54\")   Wt 61.6 kg (135 lb 14.4 oz)   SpO2 97%   BMI 21.58 kg/m²     Physical Exam  Constitutional:       General: He is not in acute distress.  HENT:      Right Ear: Tympanic membrane normal.      Left Ear: Tympanic membrane normal.   Neck:      Thyroid: No thyromegaly.   Cardiovascular:      Rate and Rhythm: Normal rate and regular rhythm.      Heart sounds: No murmur heard.  Pulmonary:      Effort: No respiratory distress.   Abdominal:      General: Bowel sounds are normal. There is no distension.      Palpations: Abdomen is soft. There is no hepatomegaly or splenomegaly.      Tenderness: There is " no abdominal tenderness.   Musculoskeletal:         General: No swelling or tenderness.   Lymphadenopathy:      Cervical: No cervical adenopathy.   Skin:     General: Skin is warm and dry.      Coloration: Skin is not jaundiced.   Neurological:      General: No focal deficit present.      Mental Status: He is alert and oriented to person, place, and time.   Psychiatric:         Mood and Affect: Mood normal.         Behavior: Behavior normal.             Assessment/Plan   Problem List Items Addressed This Visit    None  Visit Diagnoses       Wellness examination    -  Primary                 - - -

## 2025-02-06 ENCOUNTER — APPOINTMENT (OUTPATIENT)
Dept: CARDIOLOGY | Facility: CLINIC | Age: 65
End: 2025-02-06
Payer: COMMERCIAL

## 2025-02-06 ENCOUNTER — NON-APPOINTMENT (OUTPATIENT)
Age: 65
End: 2025-02-06

## 2025-02-06 VITALS
OXYGEN SATURATION: 99 % | HEART RATE: 61 BPM | SYSTOLIC BLOOD PRESSURE: 118 MMHG | WEIGHT: 234 LBS | DIASTOLIC BLOOD PRESSURE: 72 MMHG | BODY MASS INDEX: 30.03 KG/M2 | HEIGHT: 74 IN

## 2025-02-06 DIAGNOSIS — R25.2 CRAMP AND SPASM: ICD-10-CM

## 2025-02-06 DIAGNOSIS — I48.92 UNSPECIFIED ATRIAL FLUTTER: ICD-10-CM

## 2025-02-06 DIAGNOSIS — I71.9 AORTIC ANEURYSM OF UNSPECIFIED SITE, W/OUT RUPTURE: ICD-10-CM

## 2025-02-06 PROCEDURE — 93000 ELECTROCARDIOGRAM COMPLETE: CPT

## 2025-02-06 PROCEDURE — 99214 OFFICE O/P EST MOD 30 MIN: CPT

## 2025-02-14 ENCOUNTER — APPOINTMENT (OUTPATIENT)
Dept: NEUROLOGY | Facility: CLINIC | Age: 65
End: 2025-02-14
Payer: COMMERCIAL

## 2025-02-14 VITALS
SYSTOLIC BLOOD PRESSURE: 114 MMHG | BODY MASS INDEX: 30.03 KG/M2 | HEART RATE: 66 BPM | OXYGEN SATURATION: 95 % | WEIGHT: 234 LBS | HEIGHT: 74 IN | DIASTOLIC BLOOD PRESSURE: 70 MMHG

## 2025-02-14 DIAGNOSIS — G60.9 HEREDITARY AND IDIOPATHIC NEUROPATHY, UNSPECIFIED: ICD-10-CM

## 2025-02-14 PROCEDURE — 99203 OFFICE O/P NEW LOW 30 MIN: CPT

## 2025-02-14 RX ORDER — GABAPENTIN 100 MG/1
100 CAPSULE ORAL
Qty: 90 | Refills: 1 | Status: ACTIVE | COMMUNITY
Start: 2025-02-14 | End: 1900-01-01

## 2025-02-20 LAB
ALBUMIN MFR SERPL ELPH: 62.3 %
ALBUMIN SERPL-MCNC: 4 G/DL
ALBUMIN/GLOB SERPL: 1.6 RATIO
ALPHA1 GLOB MFR SERPL ELPH: 3.4 %
ALPHA1 GLOB SERPL ELPH-MCNC: 0.2 G/DL
ALPHA2 GLOB MFR SERPL ELPH: 7.5 %
ALPHA2 GLOB SERPL ELPH-MCNC: 0.5 G/DL
B-GLOBULIN MFR SERPL ELPH: 10.4 %
B-GLOBULIN SERPL ELPH-MCNC: 0.7 G/DL
ESTIMATED AVERAGE GLUCOSE: 120 MG/DL
FOLATE SERPL-MCNC: 10.3 NG/ML
GAMMA GLOB FLD ELPH-MCNC: 1.1 G/DL
GAMMA GLOB MFR SERPL ELPH: 16.4 %
HBA1C MFR BLD HPLC: 5.8 %
INTERPRETATION SERPL IEP-IMP: NORMAL
PROT SERPL-MCNC: 6.5 G/DL
PROT SERPL-MCNC: 6.5 G/DL
VIT B12 SERPL-MCNC: 354 PG/ML

## 2025-02-23 LAB — ANA SER IF-ACNC: NEGATIVE

## 2025-02-25 ENCOUNTER — NON-APPOINTMENT (OUTPATIENT)
Age: 65
End: 2025-02-25

## 2025-02-26 ENCOUNTER — NON-APPOINTMENT (OUTPATIENT)
Age: 65
End: 2025-02-26

## 2025-02-27 ENCOUNTER — NON-APPOINTMENT (OUTPATIENT)
Age: 65
End: 2025-02-27

## 2025-02-28 ENCOUNTER — APPOINTMENT (OUTPATIENT)
Dept: CARDIOLOGY | Facility: CLINIC | Age: 65
End: 2025-02-28
Payer: COMMERCIAL

## 2025-02-28 PROCEDURE — 93925 LOWER EXTREMITY STUDY: CPT

## 2025-02-28 PROCEDURE — 93923 UPR/LXTR ART STDY 3+ LVLS: CPT

## 2025-03-04 ENCOUNTER — NON-APPOINTMENT (OUTPATIENT)
Age: 65
End: 2025-03-04

## 2025-04-07 ENCOUNTER — APPOINTMENT (OUTPATIENT)
Dept: SURGERY | Facility: CLINIC | Age: 65
End: 2025-04-07
Payer: COMMERCIAL

## 2025-04-07 VITALS
RESPIRATION RATE: 16 BRPM | SYSTOLIC BLOOD PRESSURE: 129 MMHG | BODY MASS INDEX: 30.03 KG/M2 | TEMPERATURE: 97.4 F | DIASTOLIC BLOOD PRESSURE: 79 MMHG | WEIGHT: 234 LBS | HEIGHT: 74 IN | HEART RATE: 73 BPM | OXYGEN SATURATION: 97 %

## 2025-04-07 DIAGNOSIS — K42.9 UMBILICAL HERNIA W/OUT OBSTRUCTION OR GANGRENE: ICD-10-CM

## 2025-04-07 PROCEDURE — 99213 OFFICE O/P EST LOW 20 MIN: CPT

## 2025-04-15 ENCOUNTER — APPOINTMENT (OUTPATIENT)
Dept: NEUROLOGY | Facility: CLINIC | Age: 65
End: 2025-04-15
Payer: COMMERCIAL

## 2025-04-15 VITALS
BODY MASS INDEX: 28.23 KG/M2 | HEIGHT: 74 IN | WEIGHT: 220 LBS | SYSTOLIC BLOOD PRESSURE: 120 MMHG | DIASTOLIC BLOOD PRESSURE: 70 MMHG | OXYGEN SATURATION: 97 % | HEART RATE: 64 BPM

## 2025-04-15 DIAGNOSIS — G60.9 HEREDITARY AND IDIOPATHIC NEUROPATHY, UNSPECIFIED: ICD-10-CM

## 2025-04-15 PROCEDURE — 99213 OFFICE O/P EST LOW 20 MIN: CPT

## 2025-04-15 RX ORDER — GABAPENTIN 100 MG/1
100 CAPSULE ORAL
Qty: 120 | Refills: 4 | Status: ACTIVE | COMMUNITY
Start: 2025-04-15 | End: 1900-01-01

## 2025-04-15 RX ORDER — FOLIC ACID/VIT B COMPLEX AND C 400 MCG
TABLET ORAL DAILY
Qty: 90 | Refills: 0 | Status: ACTIVE | COMMUNITY
Start: 2025-04-15 | End: 1900-01-01

## 2025-04-17 ENCOUNTER — RX RENEWAL (OUTPATIENT)
Age: 65
End: 2025-04-17

## 2025-05-08 ENCOUNTER — APPOINTMENT (OUTPATIENT)
Dept: CARDIOLOGY | Facility: CLINIC | Age: 65
End: 2025-05-08

## 2025-05-08 VITALS
BODY MASS INDEX: 30.8 KG/M2 | HEIGHT: 74 IN | WEIGHT: 240 LBS | SYSTOLIC BLOOD PRESSURE: 118 MMHG | DIASTOLIC BLOOD PRESSURE: 80 MMHG | HEART RATE: 67 BPM | OXYGEN SATURATION: 97 %

## 2025-05-08 DIAGNOSIS — R06.09 OTHER FORMS OF DYSPNEA: ICD-10-CM

## 2025-05-08 DIAGNOSIS — R00.2 PALPITATIONS: ICD-10-CM

## 2025-05-08 DIAGNOSIS — I48.19 OTHER PERSISTENT ATRIAL FIBRILLATION: ICD-10-CM

## 2025-05-08 DIAGNOSIS — I48.92 UNSPECIFIED ATRIAL FLUTTER: ICD-10-CM

## 2025-05-08 DIAGNOSIS — I48.91 UNSPECIFIED ATRIAL FIBRILLATION: ICD-10-CM

## 2025-05-08 DIAGNOSIS — I48.4 ATYPICAL ATRIAL FLUTTER: ICD-10-CM

## 2025-05-08 PROCEDURE — 93000 ELECTROCARDIOGRAM COMPLETE: CPT

## 2025-05-08 PROCEDURE — 99214 OFFICE O/P EST MOD 30 MIN: CPT

## 2025-05-08 PROCEDURE — G2211 COMPLEX E/M VISIT ADD ON: CPT

## 2025-06-03 NOTE — ED ADULT TRIAGE NOTE - BSA (M2)
Keli would like to know what day/date that pt is to come in for Vit B12 injection.  Please call.    2.25

## 2025-09-08 ENCOUNTER — RX RENEWAL (OUTPATIENT)
Age: 65
End: 2025-09-08

## 2025-09-15 ENCOUNTER — APPOINTMENT (OUTPATIENT)
Dept: SURGERY | Facility: CLINIC | Age: 65
End: 2025-09-15
Payer: COMMERCIAL

## 2025-09-15 VITALS
BODY MASS INDEX: 29.52 KG/M2 | HEART RATE: 107 BPM | TEMPERATURE: 97.7 F | RESPIRATION RATE: 16 BRPM | OXYGEN SATURATION: 98 % | DIASTOLIC BLOOD PRESSURE: 72 MMHG | SYSTOLIC BLOOD PRESSURE: 112 MMHG | WEIGHT: 230 LBS | HEIGHT: 74 IN

## 2025-09-15 DIAGNOSIS — K42.9 UMBILICAL HERNIA W/OUT OBSTRUCTION OR GANGRENE: ICD-10-CM

## 2025-09-15 PROCEDURE — 99213 OFFICE O/P EST LOW 20 MIN: CPT
